# Patient Record
Sex: MALE | Race: WHITE | NOT HISPANIC OR LATINO | Employment: OTHER | URBAN - METROPOLITAN AREA
[De-identification: names, ages, dates, MRNs, and addresses within clinical notes are randomized per-mention and may not be internally consistent; named-entity substitution may affect disease eponyms.]

---

## 2018-11-06 ENCOUNTER — OFFICE VISIT (OUTPATIENT)
Dept: CARDIOLOGY CLINIC | Facility: CLINIC | Age: 83
End: 2018-11-06
Payer: MEDICARE

## 2018-11-06 VITALS
HEIGHT: 68 IN | SYSTOLIC BLOOD PRESSURE: 120 MMHG | DIASTOLIC BLOOD PRESSURE: 68 MMHG | WEIGHT: 170 LBS | HEART RATE: 80 BPM | BODY MASS INDEX: 25.76 KG/M2

## 2018-11-06 DIAGNOSIS — E78.2 MIXED HYPERLIPIDEMIA: ICD-10-CM

## 2018-11-06 DIAGNOSIS — I10 ESSENTIAL HYPERTENSION: ICD-10-CM

## 2018-11-06 DIAGNOSIS — I25.10 CORONARY ARTERY DISEASE INVOLVING NATIVE CORONARY ARTERY OF NATIVE HEART WITHOUT ANGINA PECTORIS: Primary | ICD-10-CM

## 2018-11-06 PROCEDURE — 99214 OFFICE O/P EST MOD 30 MIN: CPT | Performed by: INTERNAL MEDICINE

## 2018-11-06 PROCEDURE — 93000 ELECTROCARDIOGRAM COMPLETE: CPT | Performed by: INTERNAL MEDICINE

## 2018-11-06 NOTE — PROGRESS NOTES
Patient ID: Arun Mckay is a 80 y o  male  Plan:      Essential hypertension  Not currently on any anti-hypertensives  BP controlled today  Mixed hyperlipidemia  Not currently on statin  Has refused statin therapy in the past     Coronary artery disease involving native coronary artery of native heart without angina pectoris  S/p BMS to RCA in 2012  Currently asymptomatic  Follow up Plan:  No changes today  Follow up in 1 year  HPI: The patient presents for follow up regarding CAD (s/p BMS to RCA in 2012), HTN and HLD  He reports some lower extremity edema in his ankles which resolves by morning and is not bothersome to him  He does not have any other complaints  He and his wife are independent and still live alone  He is still driving  He denies chest pain/pressure, palpitations, orthopnea, near-syncope and syncope  Results for orders placed or performed in visit on 11/06/18   POCT ECG    Impression    Sinus rhythm  Low voltage  PVCs  Poor R wave progression  Past Surgical History:   Procedure Laterality Date    CARDIAC CATHETERIZATION  04/04/2012    Single vessel CAD; Successful bare metal stent placed in the distal RCA     CMP:   Lab Results   Component Value Date     12/02/2014    K 3 6 12/02/2014     12/02/2014    CO2 24 4 12/02/2014    BUN 16 12/02/2014    CREATININE 0 97 12/02/2014    GLUCOSE 107 12/02/2014     Lipid Profile:   Lab Results   Component Value Date    CHOL 167 02/18/2013    TRIG 138 02/18/2013    HDL 44 02/18/2013     Review of Systems   10  point ROS  was otherwise non pertinent or negative except as per HPI or as below  Gait: slow, uses cane  Objective:     /68   Pulse 80   Ht 5' 8" (1 727 m)   Wt 77 1 kg (170 lb)   BMI 25 85 kg/m²     PHYSICAL EXAM:    General:  Normal appearance in no distress  Eyes:  Anicteric  Oral mucosa:  Moist   Neck:  No JVD  Carotid upstrokes are brisk without bruits  No masses    Chest:  Clear to auscultation and percussion  Cardiac:  Normal PMI  Normal S1 and S2  No murmur gallop or rub  Abdomen:  Soft and nontender  No palpable organomegaly or aortic enlargement  Extremities:  2+ pitting edema in right ankle, 1+ left ankle  Musculoskeletal:  Symmetric  Vascular:  Femoral pulses are brisk without bruits  Popliteal pulses are intact bilaterally  Pedal pulses are intact  Neuro:  Grossly symmetric  Psych:  Alert and oriented x3  Current Outpatient Prescriptions:     aspirin 81 MG tablet, Take 1 tablet by mouth daily, Disp: , Rfl:   No Known Allergies  Past Medical History:   Diagnosis Date    Athscl heart disease of native coronary artery w/o ang pctrs     Coronary angioplasty status     HTN (hypertension)     Hx of echocardiogram 04/06/2012    EF 0 65, Normal LV function      Hyperlipidemia     Right bundle branch block     Ventricular premature depolarization        History   Smoking Status    Never Smoker   Smokeless Tobacco    Never Used

## 2019-07-29 ENCOUNTER — OFFICE VISIT (OUTPATIENT)
Dept: CARDIOLOGY CLINIC | Facility: CLINIC | Age: 84
End: 2019-07-29
Payer: MEDICARE

## 2019-07-29 VITALS
HEIGHT: 68 IN | DIASTOLIC BLOOD PRESSURE: 82 MMHG | WEIGHT: 170 LBS | BODY MASS INDEX: 25.76 KG/M2 | SYSTOLIC BLOOD PRESSURE: 146 MMHG | HEART RATE: 64 BPM

## 2019-07-29 DIAGNOSIS — I25.10 CORONARY ARTERY DISEASE INVOLVING NATIVE CORONARY ARTERY OF NATIVE HEART WITHOUT ANGINA PECTORIS: Primary | ICD-10-CM

## 2019-07-29 DIAGNOSIS — R60.0 BILATERAL LEG EDEMA: ICD-10-CM

## 2019-07-29 DIAGNOSIS — I10 ESSENTIAL HYPERTENSION: ICD-10-CM

## 2019-07-29 PROCEDURE — 99214 OFFICE O/P EST MOD 30 MIN: CPT | Performed by: INTERNAL MEDICINE

## 2019-07-29 NOTE — ASSESSMENT & PLAN NOTE
Mild   Right worse than left  Similar to 6 months ago  I do not believe this represents any deterioration of left ventricular function

## 2019-07-29 NOTE — PROGRESS NOTES
Patient ID: Breann Houston is a 80 y o  male  Plan:      Mixed hyperlipidemia  Not interested in statin therapy  Coronary artery disease involving native coronary artery of native heart without angina pectoris  Seven years post bare metal stenting of the right coronary artery  No symptoms  Bilateral leg edema  Mild  Right worse than left  Similar to 6 months ago  I do not believe this represents any deterioration of left ventricular function  Follow up Plan: At this point I would be happy to see the patient again as needed  I reviewed parameters for follow-up with his son  HPI:   The patient is seen in follow-up today regarding leg edema  There is really not too much change in this by description and is has been present for at least 1 year  Right leg is worse than left  Mainly it resolves by morning  Since the last visit he stopped driving  Hearing is difficult and there is observed difficulty with getting up from a chair  He is sometimes using a cane  He ought to use a walker  No recent chest pain or chest pressure fortunately  Most recent or relevant cardiac/vascular testing:    Echocardiogram 4/6/2012:  Normal LV function  Past Surgical History:   Procedure Laterality Date    CARDIAC CATHETERIZATION  04/04/2012    Single vessel CAD; Successful bare metal stent placed in the distal RCA     CMP:   Lab Results   Component Value Date     12/02/2014    K 3 6 12/02/2014     12/02/2014    CO2 24 4 12/02/2014    BUN 16 12/02/2014    CREATININE 0 97 12/02/2014    GLUCOSE 107 12/02/2014       Lipid Profile:   Lab Results   Component Value Date    CHOL 167 02/18/2013    TRIG 138 02/18/2013    HDL 44 02/18/2013         Review of Systems   10  point ROS  was otherwise non pertinent or negative except as per HPI or as below  Gait:  Very slow  Wide-based  Using a cane today  Needed assistance with getting up          Objective:     /82   Pulse 64   Ht 5' 8" (1 727 m)   Wt 77 1 kg (170 lb)   BMI 25 85 kg/m²     PHYSICAL EXAM:    General:  Normal appearance in no distress  Eyes:  Anicteric  Oral mucosa:  Moist   Neck:  No JVD  Carotid upstrokes are brisk without bruits  No masses  Chest:  Clear to auscultation and percussion  Cardiac:  Normal PMI  Normal S1 and S2  No murmur gallop or rub  Abdomen:  Soft and nontender  No palpable organomegaly or aortic enlargement  Extremities:  1+ pretibial edema on the right  Trace on the left  Musculoskeletal:  Symmetric  Vascular:  Femoral pulses are brisk without bruits  Popliteal pulses are intact bilaterally  Pedal pulses are intact  Neuro:  Grossly symmetric  Psych:  Alert and oriented x3  Current Outpatient Medications:     aspirin 81 MG tablet, Take 1 tablet by mouth daily, Disp: , Rfl:   No Known Allergies  Past Medical History:   Diagnosis Date    Athscl heart disease of native coronary artery w/o ang pctrs     Coronary angioplasty status     HTN (hypertension)     Hx of echocardiogram 04/06/2012    EF 0 65, Normal LV function      Hyperlipidemia     Right bundle branch block     Ventricular premature depolarization            Social History     Tobacco Use   Smoking Status Never Smoker   Smokeless Tobacco Never Used

## 2019-09-22 ENCOUNTER — HOSPITAL ENCOUNTER (INPATIENT)
Facility: HOSPITAL | Age: 84
LOS: 20 days | Discharge: NURSING, REHAB OR LONG-TERM CARE | DRG: 082 | End: 2019-10-12
Attending: INTERNAL MEDICINE | Admitting: INTERNAL MEDICINE
Payer: MEDICARE

## 2019-09-22 ENCOUNTER — APPOINTMENT (EMERGENCY)
Dept: CT IMAGING | Facility: HOSPITAL | Age: 84
End: 2019-09-22
Payer: MEDICARE

## 2019-09-22 ENCOUNTER — APPOINTMENT (EMERGENCY)
Dept: RADIOLOGY | Facility: HOSPITAL | Age: 84
End: 2019-09-22
Payer: MEDICARE

## 2019-09-22 ENCOUNTER — HOSPITAL ENCOUNTER (EMERGENCY)
Facility: HOSPITAL | Age: 84
End: 2019-09-22
Attending: EMERGENCY MEDICINE | Admitting: EMERGENCY MEDICINE
Payer: MEDICARE

## 2019-09-22 VITALS
HEART RATE: 74 BPM | RESPIRATION RATE: 18 BRPM | BODY MASS INDEX: 25.81 KG/M2 | DIASTOLIC BLOOD PRESSURE: 87 MMHG | TEMPERATURE: 98.4 F | SYSTOLIC BLOOD PRESSURE: 186 MMHG | WEIGHT: 169.75 LBS | OXYGEN SATURATION: 99 %

## 2019-09-22 DIAGNOSIS — R21 RASH: ICD-10-CM

## 2019-09-22 DIAGNOSIS — R47.01 APHASIA: ICD-10-CM

## 2019-09-22 DIAGNOSIS — I10 ESSENTIAL HYPERTENSION: ICD-10-CM

## 2019-09-22 DIAGNOSIS — G93.40 ENCEPHALOPATHY: ICD-10-CM

## 2019-09-22 DIAGNOSIS — I60.9 SAH (SUBARACHNOID HEMORRHAGE) (HCC): Primary | ICD-10-CM

## 2019-09-22 DIAGNOSIS — I61.9 HEMORRHAGIC STROKE (HCC): Primary | ICD-10-CM

## 2019-09-22 DIAGNOSIS — R33.9 URINARY RETENTION: ICD-10-CM

## 2019-09-22 DIAGNOSIS — E78.2 MIXED HYPERLIPIDEMIA: ICD-10-CM

## 2019-09-22 DIAGNOSIS — N17.9 ACUTE KIDNEY INJURY (HCC): ICD-10-CM

## 2019-09-22 PROBLEM — I48.91 A-FIB (HCC): Status: ACTIVE | Noted: 2019-09-22

## 2019-09-22 LAB
AMMONIA PLAS-SCNC: <10 UMOL/L (ref 11–35)
ANION GAP SERPL CALCULATED.3IONS-SCNC: 10 MMOL/L (ref 4–13)
APTT PPP: 32 SECONDS (ref 23–37)
BACTERIA UR QL AUTO: ABNORMAL /HPF
BASOPHILS # BLD AUTO: 0.07 THOUSANDS/ΜL (ref 0–0.1)
BASOPHILS NFR BLD AUTO: 1 % (ref 0–1)
BILIRUB UR QL STRIP: NEGATIVE
BUN SERPL-MCNC: 33 MG/DL (ref 5–25)
CALCIUM SERPL-MCNC: 8.8 MG/DL (ref 8.3–10.1)
CHLORIDE SERPL-SCNC: 105 MMOL/L (ref 100–108)
CLARITY UR: ABNORMAL
CO2 SERPL-SCNC: 24 MMOL/L (ref 21–32)
COLOR UR: YELLOW
CREAT SERPL-MCNC: 2.14 MG/DL (ref 0.6–1.3)
EOSINOPHIL # BLD AUTO: 0.12 THOUSAND/ΜL (ref 0–0.61)
EOSINOPHIL NFR BLD AUTO: 2 % (ref 0–6)
ERYTHROCYTE [DISTWIDTH] IN BLOOD BY AUTOMATED COUNT: 13.3 % (ref 11.6–15.1)
GFR SERPL CREATININE-BSD FRML MDRD: 25 ML/MIN/1.73SQ M
GLUCOSE SERPL-MCNC: 97 MG/DL (ref 65–140)
GLUCOSE SERPL-MCNC: 97 MG/DL (ref 65–140)
GLUCOSE UR STRIP-MCNC: NEGATIVE MG/DL
HCT VFR BLD AUTO: 41 % (ref 36.5–49.3)
HGB BLD-MCNC: 13.8 G/DL (ref 12–17)
HGB UR QL STRIP.AUTO: ABNORMAL
IMM GRANULOCYTES # BLD AUTO: 0.02 THOUSAND/UL (ref 0–0.2)
IMM GRANULOCYTES NFR BLD AUTO: 0 % (ref 0–2)
INR PPP: 1.14 (ref 0.84–1.19)
KETONES UR STRIP-MCNC: NEGATIVE MG/DL
LACTATE SERPL-SCNC: 1.3 MMOL/L (ref 0.5–2)
LEUKOCYTE ESTERASE UR QL STRIP: NEGATIVE
LYMPHOCYTES # BLD AUTO: 1.87 THOUSANDS/ΜL (ref 0.6–4.47)
LYMPHOCYTES NFR BLD AUTO: 23 % (ref 14–44)
MCH RBC QN AUTO: 31.2 PG (ref 26.8–34.3)
MCHC RBC AUTO-ENTMCNC: 33.7 G/DL (ref 31.4–37.4)
MCV RBC AUTO: 93 FL (ref 82–98)
MONOCYTES # BLD AUTO: 0.58 THOUSAND/ΜL (ref 0.17–1.22)
MONOCYTES NFR BLD AUTO: 7 % (ref 4–12)
NEUTROPHILS # BLD AUTO: 5.42 THOUSANDS/ΜL (ref 1.85–7.62)
NEUTS SEG NFR BLD AUTO: 67 % (ref 43–75)
NITRITE UR QL STRIP: NEGATIVE
NON-SQ EPI CELLS URNS QL MICRO: ABNORMAL /HPF
NRBC BLD AUTO-RTO: 0 /100 WBCS
PH UR STRIP.AUTO: 6 [PH]
PLATELET # BLD AUTO: 171 THOUSANDS/UL (ref 149–390)
PMV BLD AUTO: 10.6 FL (ref 8.9–12.7)
POTASSIUM SERPL-SCNC: 4.3 MMOL/L (ref 3.5–5.3)
PROT UR STRIP-MCNC: ABNORMAL MG/DL
PROTHROMBIN TIME: 14.6 SECONDS (ref 11.6–14.5)
RBC # BLD AUTO: 4.42 MILLION/UL (ref 3.88–5.62)
RBC #/AREA URNS AUTO: ABNORMAL /HPF
SODIUM SERPL-SCNC: 139 MMOL/L (ref 136–145)
SP GR UR STRIP.AUTO: 1.01 (ref 1–1.03)
UROBILINOGEN UR QL STRIP.AUTO: 0.2 E.U./DL
WBC # BLD AUTO: 8.08 THOUSAND/UL (ref 4.31–10.16)
WBC #/AREA URNS AUTO: ABNORMAL /HPF

## 2019-09-22 PROCEDURE — 99285 EMERGENCY DEPT VISIT HI MDM: CPT

## 2019-09-22 PROCEDURE — 93005 ELECTROCARDIOGRAM TRACING: CPT

## 2019-09-22 PROCEDURE — 81001 URINALYSIS AUTO W/SCOPE: CPT | Performed by: EMERGENCY MEDICINE

## 2019-09-22 PROCEDURE — 82948 REAGENT STRIP/BLOOD GLUCOSE: CPT

## 2019-09-22 PROCEDURE — 1123F ACP DISCUSS/DSCN MKR DOCD: CPT | Performed by: GENERAL PRACTICE

## 2019-09-22 PROCEDURE — 96360 HYDRATION IV INFUSION INIT: CPT

## 2019-09-22 PROCEDURE — 99291 CRITICAL CARE FIRST HOUR: CPT | Performed by: EMERGENCY MEDICINE

## 2019-09-22 PROCEDURE — 83605 ASSAY OF LACTIC ACID: CPT | Performed by: EMERGENCY MEDICINE

## 2019-09-22 PROCEDURE — 36415 COLL VENOUS BLD VENIPUNCTURE: CPT | Performed by: EMERGENCY MEDICINE

## 2019-09-22 PROCEDURE — 82140 ASSAY OF AMMONIA: CPT | Performed by: EMERGENCY MEDICINE

## 2019-09-22 PROCEDURE — 99223 1ST HOSP IP/OBS HIGH 75: CPT | Performed by: INTERNAL MEDICINE

## 2019-09-22 PROCEDURE — 70498 CT ANGIOGRAPHY NECK: CPT

## 2019-09-22 PROCEDURE — 85610 PROTHROMBIN TIME: CPT | Performed by: EMERGENCY MEDICINE

## 2019-09-22 PROCEDURE — 70496 CT ANGIOGRAPHY HEAD: CPT

## 2019-09-22 PROCEDURE — 85730 THROMBOPLASTIN TIME PARTIAL: CPT | Performed by: EMERGENCY MEDICINE

## 2019-09-22 PROCEDURE — 71045 X-RAY EXAM CHEST 1 VIEW: CPT

## 2019-09-22 PROCEDURE — 80048 BASIC METABOLIC PNL TOTAL CA: CPT | Performed by: EMERGENCY MEDICINE

## 2019-09-22 PROCEDURE — 85025 COMPLETE CBC W/AUTO DIFF WBC: CPT | Performed by: EMERGENCY MEDICINE

## 2019-09-22 RX ORDER — LABETALOL 20 MG/4 ML (5 MG/ML) INTRAVENOUS SYRINGE
10 EVERY 6 HOURS PRN
Status: DISCONTINUED | OUTPATIENT
Start: 2019-09-22 | End: 2019-09-23

## 2019-09-22 RX ORDER — ACETAMINOPHEN 325 MG/1
650 TABLET ORAL EVERY 6 HOURS PRN
Status: DISCONTINUED | OUTPATIENT
Start: 2019-09-22 | End: 2019-09-24

## 2019-09-22 RX ORDER — SODIUM CHLORIDE, SODIUM GLUCONATE, SODIUM ACETATE, POTASSIUM CHLORIDE, MAGNESIUM CHLORIDE, SODIUM PHOSPHATE, DIBASIC, AND POTASSIUM PHOSPHATE .53; .5; .37; .037; .03; .012; .00082 G/100ML; G/100ML; G/100ML; G/100ML; G/100ML; G/100ML; G/100ML
50 INJECTION, SOLUTION INTRAVENOUS CONTINUOUS
Status: DISCONTINUED | OUTPATIENT
Start: 2019-09-22 | End: 2019-09-23

## 2019-09-22 RX ORDER — NYSTATIN 100000 [USP'U]/G
POWDER TOPICAL 2 TIMES DAILY
Status: DISCONTINUED | OUTPATIENT
Start: 2019-09-23 | End: 2019-10-12 | Stop reason: HOSPADM

## 2019-09-22 RX ORDER — SODIUM CHLORIDE 9 MG/ML
125 INJECTION, SOLUTION INTRAVENOUS CONTINUOUS
Status: DISCONTINUED | OUTPATIENT
Start: 2019-09-22 | End: 2019-09-22 | Stop reason: HOSPADM

## 2019-09-22 RX ADMIN — SODIUM CHLORIDE 125 ML/HR: 0.9 INJECTION, SOLUTION INTRAVENOUS at 16:59

## 2019-09-22 RX ADMIN — SODIUM CHLORIDE, SODIUM GLUCONATE, SODIUM ACETATE, POTASSIUM CHLORIDE AND MAGNESIUM CHLORIDE 50 ML/HR: 526; 502; 368; 37; 30 INJECTION, SOLUTION INTRAVENOUS at 21:22

## 2019-09-22 RX ADMIN — LABETALOL 20 MG/4 ML (5 MG/ML) INTRAVENOUS SYRINGE 10 MG: at 22:36

## 2019-09-22 RX ADMIN — IOHEXOL 85 ML: 350 INJECTION, SOLUTION INTRAVENOUS at 16:06

## 2019-09-22 NOTE — STROKE DOCUMENTATION
Patient stated, "hello" when I said hello  Patient was able to state name and date of birth once while I was in the room with the patient

## 2019-09-22 NOTE — STROKE DOCUMENTATION
Dr Filomena Wallace speaking with son Nikia Castaneda on the phone, son admits to father being Jehovah witness and will refuse blood products  Patient's last well known is unknown and son states, "It has been months since he isn't himself " Dr Filomena Wallace will address code status with family

## 2019-09-22 NOTE — STROKE DOCUMENTATION
Patient arrived to ED with wet shorts and no underwear  Patient is excoriated bilaterally in groin region  Groin and perineal region washed

## 2019-09-22 NOTE — EMTALA/ACUTE CARE TRANSFER
[facility] EMERGENCY DEPARTMENT  [address] [place] [id number]  Dept: [address] [place] TRANSFER CONSENT    NAME Roma Bullard                                         1924                              MRN [id number]    I have been informed of my rights regarding examination, treatment, and transfer   by Dr Toy Guerrier MD    Benefits: Specialized equipment and/or services available at the receiving facility (Include comment)________________________(Intensive care unit with frequent neurologic checks, blood pressure control)    Risks: Potential for delay in receiving treatment, Potential deterioration of medical condition, Loss of IV, Increased discomfort during transfer, Possible worsening of condition or death during transfer      Consent for Transfer:  I acknowledge that my medical condition has been evaluated and explained to me by the emergency department physician or other qualified medical person and/or my attending physician, who has recommended that I be transferred to the service of  Accepting Physician: Dr Kathya Blanton at [facility] Name, [facility]  The above potential benefits of such transfer, the potential risks associated with such transfer, and the probable risks of not being transferred have been explained to me, and I fully understand them  The doctor has explained that, in my case, the benefits of transfer outweigh the risks  I agree to be transferred  I authorize the performance of emergency medical procedures and treatments upon me in both transit and upon arrival at the receiving facility  Additionally, I authorize the release of any and all medical records to the receiving facility and request they be transported with me, if possible  I understand that the safest mode of transportation during a medical emergency is an ambulance and that the Hospital advocates the use of this mode of transport   Risks of traveling to the receiving facility by car, including absence of medical control, life sustaining equipment, such as oxygen, and medical personnel has been explained to me and I fully understand them  (STEPHANIE CORRECT BOX BELOW)  [  ]  I consent to the stated transfer and to be transported by ambulance/helicopter  [  ]  I consent to the stated transfer, but refuse transportation by ambulance and accept full responsibility for my transportation by car  I understand the risks of non-ambulance transfers and I exonerate the Hospital and its staff from any deterioration in my condition that results from this refusal     X___________________________________________    DATE  19  TIME________  Signature of patient or legally responsible individual signing on patient behalf           RELATIONSHIP TO PATIENT_________________________          Provider Certification    NAME Roma Bullard                                         1924                              MRN [id number]    A medical screening exam was performed on the above named patient  Based on the examination:    Condition Necessitating Transfer The primary encounter diagnosis was Hemorrhagic stroke ([facility] [address] )  Diagnoses of Aphasia and Acute kidney injury [facility]) were also pertinent to this visit      Patient Condition: The patient has been stabilized such that within reasonable medical probability, no material deterioration of the patient condition or the condition of the unborn child(winnie) is likely to result from the transfer    Reason for Transfer: Level of Care needed not available at this facility    [facility]   · Space available and qualified personnel available for treatment as acknowledged by    · Agreed to accept transfer and to provide appropriate medical treatment as acknowledged by       Dr Kathya Blanton  · Appropriate medical records of the examination and treatment of the patient are provided at the time of transfer STAFF INITIAL WHEN COMPLETED _______  · Transfer will be performed by qualified personnel from    and appropriate transfer equipment as required, including the use of necessary and appropriate life support measures  Provider Certification: I have examined the patient and explained the following risks and benefits of being transferred/refusing transfer to the patient/family:  General risk, such as traffic hazards, adverse weather conditions, rough terrain or turbulence, possible failure of equipment (including vehicle or aircraft), or consequences of actions of persons outside the control of the transport personnel, Unanticipated needs of medical equipment and personnel during transport, The possibility of a transport vehicle being unavailable, Risk of worsening condition      Based on these reasonable risks and benefits to the patient and/or the unborn child(winnie), and based upon the information available at the time of the patients examination, I certify that the medical benefits reasonably to be expected from the provision of appropriate medical treatments at another medical facility outweigh the increasing risks, if any, to the individuals medical condition, and in the case of labor to the unborn child, from effecting the transfer      X____________________________________________ DATE 09/22/19        TIME_______      ORIGINAL - SEND TO MEDICAL RECORDS   COPY - SEND WITH PATIENT DURING TRANSFER

## 2019-09-22 NOTE — QUICK NOTE
Paged at [phone] by Dr Vin Feldman ([phone]) at [facility] ED to activate stroke alert for this patient  Patient is a 80year old man with HTN, CAD, HLD, who was discovered this afternoon to have expressive and receptive aphasia  At baseline, he reportedly lives with his wife who is 80, and his son and daughter in law live next door  He walks using a cane or walker, and does not drive  His son and other family perform daily chores and errands for him  Of note, the son reports that he has had other similar presentations to this one in the past       The son reported that patient had dinner with family last night, and he was able to speak, but had diminished fluency, which is his baseline  Overnight, his wife said he did not sleep, and was heard walking around in the house  At about 0700 this AM, he was sitting in a chair, not speaking  His son came over and offered him some juice, which in the past has helped him wake up, but this AM had no effect  They did not activate EMS because the concern was low that he was in distress, as he had prior episodes similar to this, and he had not slept overnight  Later in the afternoon, when he did not improve, they activated EMS  On exam:   , /98  He is awake, alert, but not attending the examiner  He does not name, follow commands, or have any speech  No cranial nerve deficitis  There is mild drift in the L leg, but otherwise, symmetric antigravity strength in the other extremities, with no drift  Unable to test coordination  Estimated NIHSS is 8, failure to follow commands, answer orientation questions, and aphasia, as well as drift in the L leg  CTH images reviewed: diffuse hyperdensity in the subcortical white matter of the L frontal and parietal lobes, with some cortical edema  CTA H&N images reviewed: bilateral carotid atherosclerotic calcified plaques, L>R        A/P:   80year old man with above medical conditions, presenting with aphasia, and imaging with subacute appearing hemorrhage in the L frontal/parietal subcortical white matter, following contours of the cortex, suggestive of SAH  Clinically, the Hunt and Giron score would be 4, given patient's significant mental status changes  Not a TPA candidate as his stroke symptoms are from intracerebral hemorrhage and edema  Recommended BP management with GGT, goal of systolic [phone]  Can reverse ASA with DDAVP (although son states he may not have taken it for some time), and admission to a monitored setting where he can undergo Q1h neuro checks  MRI brain may be informative in assessing for any underlying structural lesions resulting in his ICH  Would also encourage goals of care discussion with patient's family

## 2019-09-22 NOTE — ED PROVIDER NOTES
History  Chief Complaint   Patient presents with    Altered Mental Status     Patient comes to ER for evaluation of AMS  Patient was found to be unresposive to EMS however with a sternal rub patient opened eyes  Patient is non-verbal at this time      HPI   This is a 58-year-old male presenting via ambulance with altered mental status  Patient resides at home  His family called 911 after patient failed to answer any questions and was acting confused  Last known normal was 9:30 a m , when patient had a muffin  Patient did not say much today, last time he had formed any sentences was last night  Per patient's son, Mr Hoang Gaffney, ([phone]), patient has been gradually declining over the past several months, occasionally not speaking, although mostly been able to ambulate and speak  Patient's medication list notes aspirin, however, according to son, patient has not had aspirin in probably months  Patient resides at home  He is not taking any medications  He has past medical history includes coronary artery disease, patient had a stent placed about 15 years ago  The rest of past medical history is as per epic chart below  To EMS, patient was awake, he was able to open his eyes , he said yes" 1 time, however, since that time, he has been completely mute  He has not been following any commands  He is hard of hearing and wears an amplifier his left ear, however, although it seems to work, he has not been able to communicate  According to patient's son, patient has not had a fall since over a month ago  Point of care glucose was 101 to EMS  Prior to Admission Medications   Prescriptions Last Dose Informant Patient Reported?  Taking?   aspirin 81 MG tablet   Yes No   Sig: Take 1 tablet by mouth daily      Facility-Administered Medications: None       Past Medical History:   Diagnosis Date    Athscl heart disease of native coronary artery w/o ang pctrs     Coronary angioplasty status     HTN (hypertension)     Hx of echocardiogram 04/06/2012    EF 0 65, Normal LV function   Hyperlipidemia     Right bundle branch block     Ventricular premature depolarization        Past Surgical History:   Procedure Laterality Date    CARDIAC CATHETERIZATION  04/04/2012    Single vessel CAD; Successful bare metal stent placed in the distal RCA       Family History   Problem Relation Age of Onset    No Known Problems Family         noncontributory     I have reviewed and agree with the history as documented  Social History     Tobacco Use    Smoking status: Never Smoker    Smokeless tobacco: Never Used   Substance Use Topics    Alcohol use: No    Drug use: Not on file        Review of Systems   Unable to perform ROS: Patient nonverbal   Unable to perform review of systems, patient is seemingly unable to perceive anything spoken to him, nor, follow any commands  Physical Exam  Physical Exam   Constitutional: He appears well-developed and well-nourished  Non-toxic appearance  Mildly tearful   HENT:   Head: Atraumatic  Eyes: Pupils are equal, round, and reactive to light  Unable to assess extraocular movements, patient does not follow commands  There does not appear to be gaze preference, and he appears to follow this physician around with his eyes   Neck: Neck supple  Cardiovascular: Normal rate  Exam reveals no friction rub  No murmur heard  Pulmonary/Chest: Effort normal and breath sounds normal  No respiratory distress  He has no wheezes  He exhibits no tenderness  Abdominal: Soft  Bowel sounds are normal  He exhibits no distension  There is no tenderness  Musculoskeletal: He exhibits edema (Trace pedal edema bilaterally)  Skin: Skin is warm and dry  Rash:  intertriginous rash in bilateral groins, no erythema or warmth  No sacral ulcer   Nursing note and vitals reviewed    Neurologic exam:  Patient is awake, appears to look around, however , he is unable to follow any commands , nor is he on a ring any words  When right and left arm are held up, he is able to hold them up without drift  When the right leg is held up, he is able to hold up without drift  One left leg is held up, he appears to have a very mild drift, strength due to be 4/5  There is no obvious facial asymmetry  There is no nystagmus  Unable to assess extraocular movements as patient does not follow commands  Stroke Assessment     Row Name 09/22/19 1602             NIH Stroke Scale    Interval  Baseline      Level of Consciousness (1a )  0      LOC Questions (1b )  2      LOC Commands (1c )  2      Best Gaze (2 )  0      Visual (3 )  0      Facial Palsy (4 )  0      Motor Arm, Left (5a )  0      Motor Arm, Right (5b )  0      Motor Leg, Left (6a )  1      Motor Leg, Right (6b )  0      Limb Ataxia (7 )  0      Sensory (8 )  0      Best Language (9 )  3      Dysarthria (10 )  2      Extinction and Inattention (11 ) (Formerly Neglect)  0      Total  10          First Filed Value   TPA Decision  Patient not a TPA candidate  Patient is not a candidate options  Unclear time of onset outside appropriate time window  , subarachnoid hemorrhage            Vital Signs  Vitals:    09/22/19 1631 09/22/19 1646 09/22/19 1701 09/22/19 1716   BP: 151/74 147/67 138/85 147/72   TempSrc:       Pulse: 63 66 60 72   Resp: 18 18 16 16   Patient Position - Orthostatic VS: Lying Lying Lying Lying   Temp:           ED Medications  Medications   sodium chloride 0 9 % infusion (125 mL/hr Intravenous New Bag 9/22/19 1659)   iohexol (OMNIPAQUE) 350 MG/ML injection (MULTI-DOSE) 85 mL (85 mL Intravenous Given 9/22/19 1606)       Diagnostic Studies  Results Reviewed     Procedure Component Value Units Date/Time    Lactic acid, plasma [[id number]]  (Normal) Collected:  09/22/19 1606    Lab Status:  Final result Specimen:  Blood from Arm, Right Updated:  09/22/19 1632     LACTIC ACID 1 3 mmol/L     Narrative:       Result may be elevated if tourniquet was used during collection      Ammonia [[id number]]  (Abnormal) Collected:  09/22/19 1606    Lab Status:  Final result Specimen:  Blood from Arm, Right Updated:  09/22/19 1624     Ammonia <10 umol/L     Basic metabolic panel [[id number]]  (Abnormal) Collected:  09/22/19 1606    Lab Status:  Final result Specimen:  Blood from Arm, Right Updated:  09/22/19 1622     Sodium 139 mmol/L      Potassium 4 3 mmol/L      Chloride 105 mmol/L      CO2 24 mmol/L      ANION GAP 10 mmol/L      BUN 33 mg/dL      Creatinine 2 14 mg/dL      Glucose 97 mg/dL      Calcium 8 8 mg/dL      eGFR 25 ml/min/1 73sq m     Narrative:       [place] guidelines for Chronic Kidney Disease (CKD):     Stage 1 with normal or high GFR (GFR > 90 mL/min/1 73 square meters)    Stage 2 Mild CKD (GFR = 60-89 mL/min/1 73 square meters)    Stage 3A Moderate CKD (GFR = 45-59 mL/min/1 73 square meters)    Stage 3B Moderate CKD (GFR = 30-44 mL/min/1 73 square meters)    Stage 4 Severe CKD (GFR = 15-29 mL/min/1 73 square meters)    Stage 5 End Stage CKD (GFR <15 mL/min/1 73 square meters)  Note: GFR calculation is accurate only with a steady state creatinine    CBC and differential [[id number]] Collected:  09/22/19 1606    Lab Status:  Final result Specimen:  Blood from Arm, Right Updated:  09/22/19 1612     WBC 8 08 Thousand/uL      RBC 4 42 Million/uL      Hemoglobin 13 8 g/dL      Hematocrit 41 0 %      MCV 93 fL      MCH 31 2 pg      MCHC 33 7 g/dL      RDW 13 3 %      MPV 10 6 fL      Platelets 418 Thousands/uL      nRBC 0 /100 WBCs      Neutrophils Relative 67 %      Immat GRANS % 0 %      Lymphocytes Relative 23 %      Monocytes Relative 7 %      Eosinophils Relative 2 %      Basophils Relative 1 %      Neutrophils Absolute 5 42 Thousands/µL      Immature Grans Absolute 0 02 Thousand/uL      Lymphocytes Absolute 1 87 Thousands/µL      Monocytes Absolute 0 58 Thousand/µL      Eosinophils Absolute 0 12 Thousand/µL      Basophils Absolute 0 07 Thousands/µL     Fingerstick Glucose (POCT) [[id number]]  (Normal) Collected:  09/22/19 1606    Lab Status:  Final result Updated:  09/22/19 1609     POC Glucose 97 mg/dl     Protime-INR [[id number]] Collected:  09/22/19 1606    Lab Status:  No result Specimen:  Blood from Arm, Right     APTT [[id number]] Collected:  09/22/19 1606    Lab Status:  No result Specimen:  Blood from Arm, Right     UA w Reflex to Microscopic w Reflex to Culture [[id number]]     Lab Status:  No result Specimen:  Urine                  CT stroke alert brain   Final Result by Rad Vasquez MD (09/22 1642)      Suspect left central sulcus subarachnoid hemorrhage  Possible right frontal subarachnoid hemorrhage   Noncontrast brain MRI recommended for further evaluation  Findings were directly discussed with Ashli García on 9/22/2019 4:13 PM       Workstation performed: [id number]         CTA stroke alert (head/neck)   Final Result by Rad Vasquez MD (09/22 1633)      No evidence of hemodynamic significant stenosis, aneurysm or dissection        Debris within the trachea possibly aspirational          Findings were directly discussed with Ashli García on 9/22/2019 4:20 PM                      Workstation performed: [id number]         X-ray chest 1 view portable    (Results Pending)          Procedures  CriticalCare Time  Performed by: Mika Oliver MD  Authorized by: Mika Oliver MD     Critical care provider statement:     Critical care time (minutes):  38    Critical care start time:  9/22/2019 4:02 PM    Critical care end time:  9/22/2019 4:40 PM    Critical care time was exclusive of:  Separately billable procedures and treating other patients    Critical care was necessary to treat or prevent imminent or life-threatening deterioration of the following conditions:  CNS failure or compromise and renal failure    Critical care was time spent personally by me on the following activities:  Blood draw for specimens, obtaining history from patient or surrogate, development of treatment plan with patient or surrogate, discussions with consultants, examination of patient, ordering and review of laboratory studies, ordering and review of radiographic studies, re-evaluation of patient's condition and review of old charts        ED Course       EKG:  Tremulous baseline limits interpretation, no discernible P-waves making atrial fibrillation with normal ventricular response possible, ventricular rate 67,  consistent with incomplete right bundle branch block, , left axis deviation, LVH by aVL criteria, T-wave inversions in lateral precordial leads are either secondary to incomplete right bundle branch block or due to age-indeterminate ischemic changes  No STEMI  68-year-old male presenting with altered mental status, and with global aphasia  Vital signs reviewed, initially, blood pressure 169/98, this is spontaneously improving  Differential diagnosis includes stroke, underlying infection, or toxic/endocrine aberrancies resulting in altered mental status  Last known normal is either 9:30 a m  Or 7:00 a m   Nonetheless, given global aphasia as well as drift and left leg, decision made to activate a code stroke  CT head and CTA head and neck angiography are as above, CT head reveals left central sulcus subarachnoid hemorrhage with possible right frontal subarachnoid hemorrhage  Patient does not appear to have evidence of trauma, and in discussion with the son, no falls within the past month, making hemorrhagic stroke somewhat less likely to be traumatic  In discussion with Dr Pepe Gómez, will control patient's blood pressure with goal systolic blood pressure between 140 and 160  Patient has not required any antihypertensive while in the emergency department as his blood pressure spontaneously down trended to 934 systolic  Patient has not had aspirin in several months according to son, DDAVP deferred    In discussion with the son, patient is a Jew and she would not receive any whole blood products  For blood fractions, patient's son requests discussion with the [address] [place] [address] liaison at the hospital as well as with him  Patient's son expresses agreement with transferring patient to [facility] for further evaluation and care in the intensive care unit setting  Case discussed with Dr Diamond Arriola who kindly accepts patient for admission  Chest x-ray to my review was quite limited by portal study and positioning of the patient  Patient's labs revealed a creatinine of 2 14, which is new for patient and concerning for acute kidney injury  Unfortunately, given patient's presenting symptoms, obtaining CT head and neck angiography to uncover etiology of his presenting symptom outweighed the risk of contrast induced nephropathy  In hopes of mediating worsening kidney function, I started patient on maintenance fluids with normal saline at 125 mL per hour x8 hours  In discussion with patient's son, patient would be all right with IV fluids and antibiotics and other measures to help treat his conditions, however, patient would not want to be intubated or have CPR performed on him  He would not want to have heroic measures  Upon barrera catheter placement, patient had UOP of almost 3 L   Concern that an obstructive p    MDM  Number of Diagnoses or Management Options  Acute kidney injury [facility]): new and does not require workup  Aphasia: new and requires workup  Hemorrhagic stroke [facility]): new and requires workup     Amount and/or Complexity of Data Reviewed  Clinical lab tests: ordered and reviewed  Tests in the radiology section of CPT®: ordered and reviewed  Tests in the medicine section of CPT®: ordered and reviewed    Risk of Complications, Morbidity, and/or Mortality  Presenting problems: high  Diagnostic procedures: high  Management options: high    Patient Progress  Patient progress: stable      Disposition  Final diagnoses:   Hemorrhagic stroke [facility])   Aphasia   Acute kidney injury ([facility] [address] )     Time reflects when diagnosis was documented in both MDM as applicable and the Disposition within this note     Time User Action Codes Description Comment    9/22/2019  4:57 PM Yolanda Ping Add [I61 9] Hemorrhagic stroke ([facility] [address] )     9/22/2019  4:57 PM Yolanda Ping Add [R47 01] Aphasia     9/22/2019  4:57 PM Yolanda Ping Add [N17 9] Acute kidney injury [facility])       ED Disposition     ED Disposition Condition Date/Time Comment    Transfer to Another Facility-In Network  [place] Sep 22, 2019  4:57 PM Suzan Rubin should be transferred out to [facility] Intensive Care [facility]          MD Documentation      Most Recent Value   Patient Condition  The patient has been stabilized such that within reasonable medical probability, no material deterioration of the patient condition or the condition of the unborn child(winnie) is likely to result from the transfer   Reason for Transfer  Level of Care needed not available at this facility   Benefits of Transfer  Specialized equipment and/or services available at the receiving facility (Include comment)________________________ [facility] care unit with frequent neurologic checks, blood pressure control]   Risks of Transfer  Potential for delay in receiving treatment, Potential deterioration of medical condition, Loss of IV, Increased discomfort during transfer, Possible worsening of condition or death during transfer   Accepting Physician  Dr Ashlyn Panchal Name, Yazan Caputo   Provider Certification  General risk, such as traffic hazards, adverse weather conditions, rough terrain or turbulence, possible failure of equipment (including vehicle or aircraft), or consequences of actions of persons outside the control of the transport personnel, Unanticipated needs of medical equipment and personnel during transport, The possibility of a transport vehicle being unavailable, Risk of worsening condition      RN Documentation      Most Recent Value   Accepting Facility Name, [facility]      Follow-up Information    None         ED Provider  Electronically Signed by           Dale Zee MD  09/22/19 1492       Dale Zee MD  09/23/19 [address], [place]  09/23/19 8342

## 2019-09-22 NOTE — H&P
History and Physical - Critical Care   Reina Ovalle 80 y o  male MRN: [id number]  Unit/Bed#: ICU 11 Encounter: [id number]    Reason for Admission / Chief Complaint: SAH L central sulcus, possible R frontal as well    History of Present Illness:  Reina Ovalle is a 80 y o  male who presented to [facility] ED today and stroke alert was activated for aphasia  CT showed L central sulcus SAH and possible R frontal SAH  Pt lives at home with wife, and son lives next door  Pt takes no meds  History of CAD s/p stent 15 yrs ago  Unknown if there was any fall, but this afternoon pt was reportedly found sitting in a chair, seemed confused, not speaking, not following commands  Per family, pt has had gradual decline over the last several months, sometimes having difficulty speaking, often not sleeping at night and wandering the house  Pt unable to provide history at this time  He says he does not know what happened today  Does not know why he is in the hospital  Denies any pain or any complaints  Per son who is MPOA, pt is Rastafari  DNR/DNI  History obtained from chart review  ______________________________________________________________________    Assessment:   Principal Problem:    SAH (subarachnoid hemorrhage) (HCC)  Active Problems:    Coronary artery disease involving native coronary artery of native heart without angina pectoris    Bilateral leg edema    A-fib (HCC)  Resolved Problems:    * No resolved hospital problems  *      Plan:    Neuro:   SAH- pt has waxing/waning speech difficulty but this appears to be baseline  Otherwise no focal deficits  Pt follows commands, answers questions, strength and sensation intact all extremities  Neurology consult  Appreciate recs  Repeat CTH in AM  q2h neuro checks  sbp goal 140-160  Cam ICU, delirium prevention  CV:   Atrial fibrillation, rate controlled  New onset? Echo ordered  Monitor rate  No anticoagulation at this time given SAH   TSH in AM   Labetalol to keep sbp 140-160 as above  History of CAD- takes no home meds  Stent 15 yrs ago    Pulm:   No acute issues  Encourage deep breathing  Continue to monitor  GI:   Regular diet    :   No acute issues  Monitor I/Os    F/E/N:   isolyte at 50ml/hr  Replete electrolytes as indicated  Possible FRANCOIS  Cr 2 14 on admission  Uncertain if pt has baseline CKD but was normal in 2014  ID:  No evidence of acute infection at this time  Continue to monitor  Heme:   Pt has ASA on his medication list but per family he is not actually taking it  No indication for ddavp  SCDs for DVT prophylaxis    Endo:   No known history of DM  HgbA1c and TSH in AM    Msk/Skin:   Nystatin powder to groin area  Frequent turning, repositioning  Early mobilization  PT/OT    Disposition: level 1 stepdown for frequent neuro checks    ______________________________________________________________________  Past Medical History:  Past Medical History:   Diagnosis Date    Athscl heart disease of native coronary artery w/o ang pctrs     Coronary angioplasty status     HTN (hypertension)     Hx of echocardiogram 04/06/2012    EF 0 65, Normal LV function   Hyperlipidemia     Right bundle branch block     Ventricular premature depolarization        Past Surgical History:  Past Surgical History:   Procedure Laterality Date    CARDIAC CATHETERIZATION  04/04/2012    Single vessel CAD;  Successful bare metal stent placed in the distal RCA       Past Family History:  Family History   Problem Relation Age of Onset    No Known Problems Family         noncontributory       Social History:  Social History     Tobacco Use   Smoking Status Never Smoker   Smokeless Tobacco Never Used     Social History     Substance and Sexual Activity   Alcohol Use No     Social History     Substance and Sexual Activity   Drug Use Not on file     Marital Status: /Civil Union    Medications:  Current Facility-Administered Medications   Medication Dose Route Frequency    multi-electrolyte (PLASMALYTE-A/ISOLYTE-S PH 7 4) IV solution  50 mL/hr Intravenous Continuous     Home medications:  Prior to Admission medications    Medication Sig Start Date End Date Taking? Authorizing Provider   aspirin 81 MG tablet Take 1 tablet by mouth daily 12   Historical Provider, MD     Allergies:  No Known Allergies    ROS:   Review of Systems   Constitutional: Negative for appetite change, fatigue and fever  HENT: Negative for congestion, rhinorrhea, sore throat and trouble swallowing  Eyes: Negative for photophobia, pain and visual disturbance  Respiratory: Negative for cough, chest tightness and shortness of breath  Cardiovascular: Positive for leg swelling  Negative for chest pain and palpitations  Gastrointestinal: Negative for abdominal pain, nausea and vomiting  Genitourinary: Negative for difficulty urinating, flank pain, frequency and urgency  Musculoskeletal: Negative for back pain, neck pain and neck stiffness  Skin: Positive for color change (erythema groin area)  Negative for pallor and wound  Neurological: Positive for speech difficulty (baseline?)  Negative for dizziness, light-headedness and headaches  Hematological: Negative for adenopathy  Does not bruise/bleed easily  Vitals:  Vitals:    19 1900 19 1915   BP: 133/76 159/74   BP Location: Right arm Right arm   Pulse: 74 62   Resp: 19 12   Temp:  97 9 °F (36 6 °C)   TempSrc:  Oral   SpO2: 99% 99%   Weight: 71 kg (156 lb 8 4 oz)    Height: 5' 9" (1 753 m)      Temperature:   Temp (24hrs), Av 2 °F (36 8 °C), Min:97 9 °F (36 6 °C), Max:98 4 °F (36 9 °C)    Current Temperature: 97 9 °F (36 6 °C)    Weights:   IBW: 70 7 kg  Body mass index is 23 11 kg/m²         Non-Invasive/Invasive Ventilation Settings:  Respiratory    Lab Data (Last 4 hours)    None         O2/Vent Data (Last 4 hours)    None              No results found for: PHART, QUV6HGL, PO2ART, OJP6YLJ, Z9KCUNAR, BEART, SOURCE       Physical Exam:  Physical Exam   Constitutional:   Thin, elderly appearing male in no acute distress   HENT:   Head: Normocephalic  Mouth/Throat: Oropharynx is clear and moist    No evidence of trauma   Eyes: Pupils are equal, round, and reactive to light  Conjunctivae and EOM are normal  Right eye exhibits no discharge  Left eye exhibits no discharge  Neck: Normal range of motion  Neck supple  nontender   Cardiovascular: Normal rate and intact distal pulses  Irregular rhythm   Pulmonary/Chest: Effort normal  No respiratory distress  He has no wheezes  He exhibits no tenderness  Abdominal: Soft  There is no tenderness  There is no rebound and no guarding  Musculoskeletal: He exhibits edema (R>L)  He exhibits no tenderness or deformity  Neurological: He is alert  No cranial nerve deficit or sensory deficit  He exhibits normal muscle tone  Oriented to self and location  Follows commands, strength 5/5 all four extremities, sensation intact  Some difficulty with fluency of speech but uncertain if this is baseline  Pt able to answer questions about where he lives, who he lives with, etc  However, he has no idea why he is in the hospital or what happened today  Skin: Skin is warm and dry  Capillary refill takes less than 2 seconds  He is not diaphoretic  Nursing note and vitals reviewed        Labs:  Results from last 7 days   Lab Units 09/22/19  1606   WBC Thousand/uL 8 08   HEMOGLOBIN g/dL 13 8   HEMATOCRIT % 41 0   PLATELETS Thousands/uL 171   NEUTROS PCT % 67   MONOS PCT % 7     Results from last 7 days   Lab Units 09/22/19  1606   SODIUM mmol/L 139   POTASSIUM mmol/L 4 3   CHLORIDE mmol/L 105   CO2 mmol/L 24   ANION GAP mmol/L 10   BUN mg/dL 33*   CREATININE mg/dL 2 14*   CALCIUM mg/dL 8 8   GLUCOSE RANDOM mg/dL 97          Results from last 7 days   Lab Units 09/22/19  1750   INR  1 14   PTT seconds 32          Results from last 7 days   Lab Units 09/22/19  1606   LACTIC ACID mmol/L 1  3     ABG:    VBG:            Imaging: Ct Stroke Alert Brain    Result Date: 9/22/2019  Impression: Suspect left central sulcus subarachnoid hemorrhage  Possible right frontal subarachnoid hemorrhage Noncontrast brain MRI recommended for further evaluation  Findings were directly discussed with Jose Juan Yen on 9/22/2019 4:13 PM  Workstation performed: [id number]     Cta Stroke Alert (head/neck)    Result Date: 9/22/2019  Impression: No evidence of hemodynamic significant stenosis, aneurysm or dissection  Debris within the trachea possibly aspirational  Findings were directly discussed with Jose Juan Yen on 9/22/2019 4:20 PM  Workstation performed: [id number]    I have personally reviewed pertinent reports  and I have personally reviewed pertinent films in PACS    Micro:          VTE Pharmacologic Prophylaxis: none- SAH  VTE Mechanical Prophylaxis: sequential compression device    Invasive lines and devices: Invasive Devices     Peripheral Intravenous Line            Peripheral IV 09/22/19 Left Forearm less than 1 day    Peripheral IV 09/22/19 Right Forearm less than 1 day          Drain            Urethral Catheter 16 Fr  less than 1 day                Code Status: Level 3 - DNAR and DNI  POA:    POLST:      Given critical illness, patient length of stay will require greater than two midnights        Adry Cuellar MD

## 2019-09-23 ENCOUNTER — APPOINTMENT (INPATIENT)
Dept: NON INVASIVE DIAGNOSTICS | Facility: HOSPITAL | Age: 84
DRG: 082 | End: 2019-09-23
Payer: MEDICARE

## 2019-09-23 ENCOUNTER — APPOINTMENT (INPATIENT)
Dept: RADIOLOGY | Facility: HOSPITAL | Age: 84
DRG: 082 | End: 2019-09-23
Payer: MEDICARE

## 2019-09-23 PROBLEM — R47.01 APHASIA: Status: ACTIVE | Noted: 2019-09-23

## 2019-09-23 LAB
ANION GAP SERPL CALCULATED.3IONS-SCNC: 6 MMOL/L (ref 4–13)
ATRIAL RATE: 60 BPM
ATRIAL RATE: 63 BPM
BUN SERPL-MCNC: 29 MG/DL (ref 5–25)
CALCIUM SERPL-MCNC: 8 MG/DL (ref 8.3–10.1)
CHLORIDE SERPL-SCNC: 108 MMOL/L (ref 100–108)
CHOLEST SERPL-MCNC: 102 MG/DL (ref 50–200)
CO2 SERPL-SCNC: 25 MMOL/L (ref 21–32)
CREAT SERPL-MCNC: 1.85 MG/DL (ref 0.6–1.3)
EST. AVERAGE GLUCOSE BLD GHB EST-MCNC: 120 MG/DL
GFR SERPL CREATININE-BSD FRML MDRD: 30 ML/MIN/1.73SQ M
GLUCOSE SERPL-MCNC: 108 MG/DL (ref 65–140)
HBA1C MFR BLD: 5.8 % (ref 4.2–6.3)
HDLC SERPL-MCNC: 46 MG/DL (ref 40–60)
LDLC SERPL CALC-MCNC: 40 MG/DL (ref 0–100)
MAGNESIUM SERPL-MCNC: 2.1 MG/DL (ref 1.6–2.6)
NONHDLC SERPL-MCNC: 56 MG/DL
P AXIS: 1 DEGREES
POTASSIUM SERPL-SCNC: 3.6 MMOL/L (ref 3.5–5.3)
PR INTERVAL: 171 MS
QRS AXIS: -44 DEGREES
QRS AXIS: -51 DEGREES
QRSD INTERVAL: 112 MS
QRSD INTERVAL: 125 MS
QT INTERVAL: 440 MS
QT INTERVAL: 454 MS
QTC INTERVAL: 454 MS
QTC INTERVAL: 464 MS
SODIUM SERPL-SCNC: 139 MMOL/L (ref 136–145)
T WAVE AXIS: -30 DEGREES
T WAVE AXIS: -4 DEGREES
TRIGL SERPL-MCNC: 80 MG/DL
TSH SERPL DL<=0.05 MIU/L-ACNC: 2.02 UIU/ML (ref 0.36–3.74)
VENTRICULAR RATE: 60 BPM
VENTRICULAR RATE: 67 BPM

## 2019-09-23 PROCEDURE — 80048 BASIC METABOLIC PNL TOTAL CA: CPT | Performed by: EMERGENCY MEDICINE

## 2019-09-23 PROCEDURE — 97167 OT EVAL HIGH COMPLEX 60 MIN: CPT

## 2019-09-23 PROCEDURE — 70030 X-RAY EYE FOR FOREIGN BODY: CPT

## 2019-09-23 PROCEDURE — 93010 ELECTROCARDIOGRAM REPORT: CPT | Performed by: INTERNAL MEDICINE

## 2019-09-23 PROCEDURE — 97163 PT EVAL HIGH COMPLEX 45 MIN: CPT

## 2019-09-23 PROCEDURE — 93306 TTE W/DOPPLER COMPLETE: CPT | Performed by: INTERNAL MEDICINE

## 2019-09-23 PROCEDURE — 93005 ELECTROCARDIOGRAM TRACING: CPT

## 2019-09-23 PROCEDURE — G8987 SELF CARE CURRENT STATUS: HCPCS

## 2019-09-23 PROCEDURE — 93306 TTE W/DOPPLER COMPLETE: CPT

## 2019-09-23 PROCEDURE — 80061 LIPID PANEL: CPT | Performed by: EMERGENCY MEDICINE

## 2019-09-23 PROCEDURE — 99223 1ST HOSP IP/OBS HIGH 75: CPT | Performed by: PSYCHIATRY & NEUROLOGY

## 2019-09-23 PROCEDURE — 70450 CT HEAD/BRAIN W/O DYE: CPT

## 2019-09-23 PROCEDURE — G8988 SELF CARE GOAL STATUS: HCPCS

## 2019-09-23 PROCEDURE — 83036 HEMOGLOBIN GLYCOSYLATED A1C: CPT | Performed by: EMERGENCY MEDICINE

## 2019-09-23 PROCEDURE — 83735 ASSAY OF MAGNESIUM: CPT | Performed by: EMERGENCY MEDICINE

## 2019-09-23 PROCEDURE — 99233 SBSQ HOSP IP/OBS HIGH 50: CPT | Performed by: ANESTHESIOLOGY

## 2019-09-23 PROCEDURE — G8978 MOBILITY CURRENT STATUS: HCPCS

## 2019-09-23 PROCEDURE — G8979 MOBILITY GOAL STATUS: HCPCS

## 2019-09-23 PROCEDURE — 84443 ASSAY THYROID STIM HORMONE: CPT | Performed by: EMERGENCY MEDICINE

## 2019-09-23 RX ORDER — SODIUM CHLORIDE, SODIUM GLUCONATE, SODIUM ACETATE, POTASSIUM CHLORIDE, MAGNESIUM CHLORIDE, SODIUM PHOSPHATE, DIBASIC, AND POTASSIUM PHOSPHATE .53; .5; .37; .037; .03; .012; .00082 G/100ML; G/100ML; G/100ML; G/100ML; G/100ML; G/100ML; G/100ML
50 INJECTION, SOLUTION INTRAVENOUS CONTINUOUS
Status: DISPENSED | OUTPATIENT
Start: 2019-09-23 | End: 2019-09-24

## 2019-09-23 RX ORDER — LABETALOL 20 MG/4 ML (5 MG/ML) INTRAVENOUS SYRINGE
5 EVERY 6 HOURS PRN
Status: DISCONTINUED | OUTPATIENT
Start: 2019-09-23 | End: 2019-09-25

## 2019-09-23 RX ORDER — SODIUM CHLORIDE, SODIUM GLUCONATE, SODIUM ACETATE, POTASSIUM CHLORIDE, MAGNESIUM CHLORIDE, SODIUM PHOSPHATE, DIBASIC, AND POTASSIUM PHOSPHATE .53; .5; .37; .037; .03; .012; .00082 G/100ML; G/100ML; G/100ML; G/100ML; G/100ML; G/100ML; G/100ML
500 INJECTION, SOLUTION INTRAVENOUS ONCE
Status: COMPLETED | OUTPATIENT
Start: 2019-09-23 | End: 2019-09-23

## 2019-09-23 RX ORDER — POTASSIUM CHLORIDE 20 MEQ/1
40 TABLET, EXTENDED RELEASE ORAL ONCE
Status: COMPLETED | OUTPATIENT
Start: 2019-09-23 | End: 2019-09-23

## 2019-09-23 RX ADMIN — SODIUM CHLORIDE, SODIUM GLUCONATE, SODIUM ACETATE, POTASSIUM CHLORIDE AND MAGNESIUM CHLORIDE 500 ML: 526; 502; 368; 37; 30 INJECTION, SOLUTION INTRAVENOUS at 01:15

## 2019-09-23 RX ADMIN — NYSTATIN: 100000 POWDER TOPICAL at 17:46

## 2019-09-23 RX ADMIN — POTASSIUM CHLORIDE 40 MEQ: 1500 TABLET, EXTENDED RELEASE ORAL at 09:34

## 2019-09-23 RX ADMIN — NYSTATIN: 100000 POWDER TOPICAL at 09:34

## 2019-09-23 RX ADMIN — LABETALOL 20 MG/4 ML (5 MG/ML) INTRAVENOUS SYRINGE 5 MG: at 21:23

## 2019-09-23 RX ADMIN — SODIUM CHLORIDE, SODIUM GLUCONATE, SODIUM ACETATE, POTASSIUM CHLORIDE, MAGNESIUM CHLORIDE, SODIUM PHOSPHATE, DIBASIC, AND POTASSIUM PHOSPHATE 50 ML/HR: .53; .5; .37; .037; .03; .012; .00082 INJECTION, SOLUTION INTRAVENOUS at 17:45

## 2019-09-23 NOTE — PLAN OF CARE
Problem: PHYSICAL THERAPY ADULT  Goal: Performs mobility at highest level of function for planned discharge setting  See evaluation for individualized goals  Description  Treatment/Interventions: Functional transfer training, LE strengthening/ROM, Therapeutic exercise, Endurance training, Bed mobility, Gait training, Spoke to nursing, Spoke to case management, OT          See flowsheet documentation for full assessment, interventions and recommendations  Note:   Prognosis: Good  Problem List: Decreased strength, Decreased endurance, Impaired balance, Decreased mobility, Decreased coordination, Decreased cognition, Impaired judgement, Decreased safety awareness, Impaired hearing  Assessment: Pt is a 79 yo male presenting to [facility]"" from [facility] on 9/22/19 as a stroke alert  CT showed L central sulcus SAH and possible R frontal SAH  Pt  has a past medical history of Arthritis, Athscl heart disease of native coronary artery w/o ang pctrs, Coronary angioplasty status, HTN (hypertension), echocardiogram, Hyperlipidemia, Right bundle branch block, and Ventricular premature depolarization  Pt is supine at start of session and agreeable to therapy  Pt is limited by hearing impairment and cognition  Pt presents with slowed processing and requires frequent cues for attention to task  Pt transferred supine to sit with Bob  Pt transferred sit <> stand with modA x1 and RW  Pt initially retropulsive, requiring cues for anterior weight shift  Pt ambulated 3 ft to bedside chair with Bob x2 and RW  Pt presents with slowed, shuffled govind and required frequent cues for proper weight shifting and sequencing  Pt remained seated in bedside chair with chair alarm in place and all needs within reach  PT to recommend inpt rehab to maximize safety and independence with functional mobility   PT to follow        Recommendation: (S) Post acute IP rehab     PT - OK to Discharge: (S) Yes(To inpt rehab when medically stable)    See flowsheet documentation for full assessment        Lizandro Love, PT, DPT

## 2019-09-23 NOTE — PROGRESS NOTES
Patient report has been called to Jason Pak RN from the [address] [place] [address] 6 Nursing unit  The patient will be transferred to Room 634 as a Stepdown Level 2 status patient  The patient's family is aware

## 2019-09-23 NOTE — SOCIAL WORK
CM placed call to pts son, Betty Scott, to review the CM role and discuss possible dc needs  Pt lives in a two story home with a first floor set up in [place], [place]  Pt requires moderate assistance from his son/family to bathe and dress  Pts son and daughter in law live next door and are readily available  Pts son reports his father has not taken his medications "in decades"  Pt has DME of a cane and a walker and is ambulatory without assistance  Pts son denies any history of drug/etoh abuse, mental illness or inpatient psych admissions  Pt denies any history of SNF/Rehab or VNA  Pts son, Betty Scott, is the POA  Pt has a living will  Pt is a Jewish, as per son, pt does not wish to have any blood products  Preferred Pharmacy: pt has not used a pharmacy in years, as per son  Contact: Mike Julio, son, [phone]  PCP: Dr Aman Agrawal    CM reviewed d/c planning process including the following: identifying help at home, patient preference for d/c planning needs, Discharge unge, [facility] Meds to Bed program, availability of treatment team to discuss questions or concerns patient and/or family may have regarding understanding medications and recognizing signs and symptoms once discharged  CM also encouraged patient to follow up with all recommended appointments after discharge  Patient advised of importance for patient and family to participate in managing patients medical well being

## 2019-09-23 NOTE — PLAN OF CARE
Problem: Prexisting or High Potential for Compromised Skin Integrity  Goal: Skin integrity is maintained or improved  Description  INTERVENTIONS:  - Identify patients at risk for skin breakdown  - Assess and monitor skin integrity  - Assess and monitor nutrition and hydration status  - Monitor labs   - Assess for incontinence   - Turn and reposition patient  - Assist with mobility/ambulation  - Relieve pressure over bony prominences  - Avoid friction and shearing  - Provide appropriate hygiene as needed including keeping skin clean and dry  - Evaluate need for skin moisturizer/barrier cream  - Collaborate with interdisciplinary team   - Patient/family teaching  - Consider wound care consult   Outcome: Progressing     Problem: Neurological Deficit  Goal: Neurological status is stable or improving  Description  Interventions:  - Monitor and assess patient's level of consciousness, motor function, sensory function, and level of assistance needed for ADLs  - Monitor and report changes from baseline  Collaborate with interdisciplinary team to initiate plan and implement interventions as ordered  - Provide and maintain a safe environment  - Consider seizure precautions  - Consider fall precautions  - Consider aspiration precautions  - Consider bleeding precautions  Outcome: Progressing     Problem: Activity Intolerance/Impaired Mobility  Goal: Mobility/activity is maintained at optimum level for patient  Description  Interventions:  - Assess and monitor patient  barriers to mobility and need for assistive/adaptive devices  - Assess patient's emotional response to limitations  - Collaborate with interdisciplinary team and initiate plans and interventions as ordered  - Encourage independent activity per ability   - Maintain proper body alignment  - Perform active/passive rom as tolerated/ordered    - Plan activities to conserve energy   - Turn patient as appropriate  Outcome: Progressing     Problem: Communication Impairment  Goal: Ability to express needs and understand communication  Description  Assess patient's communication skills and ability to understand information  Patient will demonstrate use of effective communication techniques, alternative methods of communication and understanding even if not able to speak  - Encourage communication and provide alternate methods of communication as needed  - Collaborate with case management/ for discharge needs  - Include patient/family/caregiver in decisions related to communication  Outcome: Progressing     Problem: Potential for Aspiration  Goal: Non-ventilated patient's risk of aspiration is minimized  Description  Assess and monitor vital signs, respiratory status, and labs (WBC)  Monitor for signs of aspiration (tachypnea, cough, rales, wheezing, cyanosis, fever)  - Assess and monitor patient's ability to swallow  - Place patient up in chair to eat if possible  - HOB up at 90 degrees to eat if unable to get patient up into chair   - Supervise patient during oral intake  - Instruct patient/ family to take small bites  - Instruct patient/ family to take small single sips when taking liquids  - Follow patient-specific strategies generated by speech pathologist   Outcome: Progressing     Problem: Nutrition  Goal: Nutrition/Hydration status is improving  Description  Monitor and assess patient's nutrition/hydration status for malnutrition (ex- brittle hair, bruises, dry skin, pale skin and conjunctiva, muscle wasting, smooth red tongue, and disorientation)  Collaborate with interdisciplinary team and initiate plan and interventions as ordered  Monitor patient's weight and dietary intake as ordered or per policy  Utilize nutrition screening tool and intervene per policy  Determine patient's food preferences and provide high-protein, high-caloric foods as appropriate       - Assist patient with eating   - Allow adequate time for meals   - Encourage patient to take dietary supplement as ordered  - Collaborate with clinical nutritionist   - Include patient/family/caregiver in decisions related to nutrition  Outcome: Progressing     Problem: NEUROSENSORY - ADULT  Goal: Achieves stable or improved neurological status  Description  INTERVENTIONS  - Monitor and report changes in neurological status  - Monitor vital signs such as temperature, blood pressure, glucose, and any other labs ordered   - Initiate measures to prevent increased intracranial pressure  - Monitor for seizure activity and implement precautions if appropriate      Outcome: Progressing  Goal: Achieves maximal functionality and self care  Description  INTERVENTIONS  - Monitor swallowing and airway patency with patient fatigue and changes in neurological status  - Encourage and assist patient to increase activity and self care     - Encourage visually impaired, hearing impaired and aphasic patients to use assistive/communication devices  Outcome: Progressing     Problem: CARDIOVASCULAR - ADULT  Goal: Maintains optimal cardiac output and hemodynamic stability  Description  INTERVENTIONS:  - Monitor I/O, vital signs and rhythm  - Monitor for S/S and trends of decreased cardiac output  - Administer and titrate ordered vasoactive medications to optimize hemodynamic stability  - Assess quality of pulses, skin color and temperature  - Assess for signs of decreased coronary artery perfusion  - Instruct patient to report change in severity of symptoms  Outcome: Progressing  Goal: Absence of cardiac dysrhythmias or at baseline rhythm  Description  INTERVENTIONS:  - Continuous cardiac monitoring, vital signs, obtain 12 lead EKG if ordered  - Administer antiarrhythmic and heart rate control medications as ordered  - Monitor electrolytes and administer replacement therapy as ordered  Outcome: Progressing     Problem: GASTROINTESTINAL - ADULT  Goal: Maintains or returns to baseline bowel function  Description  INTERVENTIONS:  - Assess bowel function  - Encourage oral fluids to ensure adequate hydration  - Administer IV fluids if ordered to ensure adequate hydration  - Administer ordered medications as needed  - Encourage mobilization and activity  - Consider nutritional services referral to assist patient with adequate nutrition and appropriate food choices  Outcome: Progressing  Goal: Maintains adequate nutritional intake  Description  INTERVENTIONS:  - Monitor percentage of each meal consumed  - Identify factors contributing to decreased intake, treat as appropriate  - Assist with meals as needed  - Monitor I&O, weight, and lab values if indicated  - Obtain nutrition services referral as needed  Outcome: Progressing     Problem: GENITOURINARY - ADULT  Goal: Urinary catheter remains patent  Description  INTERVENTIONS:  - Assess patency of urinary catheter  - If patient has a chronic barrera, consider changing catheter if non-functioning  - Follow guidelines for intermittent irrigation of non-functioning urinary catheter  Outcome: Progressing     Problem: METABOLIC, FLUID AND ELECTROLYTES - ADULT  Goal: Electrolytes maintained within normal limits  Description  INTERVENTIONS:  - Monitor labs and assess patient for signs and symptoms of electrolyte imbalances  - Administer electrolyte replacement as ordered  - Monitor response to electrolyte replacements, including repeat lab results as appropriate  - Instruct patient on fluid and nutrition as appropriate  Outcome: Progressing  Goal: Fluid balance maintained  Description  INTERVENTIONS:  - Monitor labs   - Monitor I/O and WT  - Instruct patient on fluid and nutrition as appropriate  - Assess for signs & symptoms of volume excess or deficit  Outcome: Progressing     Problem: SKIN/TISSUE INTEGRITY - ADULT  Goal: Skin integrity remains intact  Description  INTERVENTIONS  - Identify patients at risk for skin breakdown  - Assess and monitor skin integrity  - Assess and monitor nutrition and hydration status  - Monitor labs (i e  albumin)  - Assess for incontinence   - Turn and reposition patient  - Assist with mobility/ambulation  - Relieve pressure over bony prominences  - Avoid friction and shearing  - Provide appropriate hygiene as needed including keeping skin clean and dry  - Evaluate need for skin moisturizer/barrier cream  - Collaborate with interdisciplinary team (i e  Nutrition, Rehabilitation, etc )   - Patient/family teaching  Outcome: Progressing     Problem: HEMATOLOGIC - ADULT  Goal: Maintains hematologic stability  Description  INTERVENTIONS  - Assess for signs and symptoms of bleeding or hemorrhage  - Monitor labs  - Administer supportive blood products/factors as ordered and appropriate  Outcome: Progressing     Problem: MUSCULOSKELETAL - ADULT  Goal: Maintain or return mobility to safest level of function  Description  INTERVENTIONS:  - Assess patient's ability to carry out ADLs; assess patient's baseline for ADL function and identify physical deficits which impact ability to perform ADLs (bathing, care of mouth/teeth, toileting, grooming, dressing, etc )  - Assess/evaluate cause of self-care deficits   - Assess range of motion  - Assess patient's mobility  - Assess patient's need for assistive devices and provide as appropriate  - Encourage maximum independence but intervene and supervise when necessary  - Involve family in performance of ADLs  - Assess for home care needs following discharge   - Consider OT consult to assist with ADL evaluation and planning for discharge  - Provide patient education as appropriate  Outcome: Progressing     Problem: PAIN - ADULT  Goal: Verbalizes/displays adequate comfort level or baseline comfort level  Description  Interventions:  - Encourage patient to monitor pain and request assistance  - Assess pain using appropriate pain scale  - Administer analgesics based on type and severity of pain and evaluate response  - Implement non-pharmacological measures as appropriate and evaluate response  - Consider cultural and social influences on pain and pain management  - Notify physician/advanced practitioner if interventions unsuccessful or patient reports new pain  Outcome: Progressing     Problem: INFECTION - ADULT  Goal: Absence or prevention of progression during hospitalization  Description  INTERVENTIONS:  - Assess and monitor for signs and symptoms of infection  - Monitor lab/diagnostic results  - Monitor all insertion sites, i e  indwelling lines, tubes, and drains  - Monitor endotracheal if appropriate and nasal secretions for changes in amount and color  - [place] appropriate cooling/warming therapies per order  - Administer medications as ordered  - Instruct and encourage patient and family to use good hand hygiene technique  - Identify and instruct in appropriate isolation precautions for identified infection/condition  Outcome: Progressing     Problem: SAFETY ADULT  Goal: Patient will remain free of falls  Description  INTERVENTIONS:  - Assess patient frequently for physical needs  -  Identify cognitive and physical deficits and behaviors that affect risk of falls    -  [place] fall precautions as indicated by assessment   - Educate patient/family on patient safety including physical limitations  - Instruct patient to call for assistance with activity based on assessment  - Modify environment to reduce risk of injury  - Consider OT/PT consult to assist with strengthening/mobility  Outcome: Progressing  Goal: Maintain or return to baseline ADL function  Description  INTERVENTIONS:  -  Assess patient's ability to carry out ADLs; assess patient's baseline for ADL function and identify physical deficits which impact ability to perform ADLs (bathing, care of mouth/teeth, toileting, grooming, dressing, etc )  - Assess/evaluate cause of self-care deficits   - Assess range of motion  - Assess patient's mobility; develop plan if impaired  - Assess patient's need for assistive devices and provide as appropriate  - Encourage maximum independence but intervene and supervise when necessary  - Involve family in performance of ADLs  - Assess for home care needs following discharge   - Consider OT consult to assist with ADL evaluation and planning for discharge  - Provide patient education as appropriate  Outcome: Progressing  Goal: Maintain or return mobility status to optimal level  Description  INTERVENTIONS:  - Assess patient's baseline mobility status (ambulation, transfers, stairs, etc )    - Identify cognitive and physical deficits and behaviors that affect mobility  - Identify mobility aids required to assist with transfers and/or ambulation (gait belt, sit-to-stand, lift, walker, cane, etc )  - [place] fall precautions as indicated by assessment  - Record patient progress and toleration of activity level on Mobility SBAR; progress patient to next Phase/Stage  - Instruct patient to call for assistance with activity based on assessment  - Consider rehabilitation consult to assist with strengthening/weightbearing, etc   Outcome: Progressing     Problem: DISCHARGE PLANNING  Goal: Discharge to home or other facility with appropriate resources  Description  INTERVENTIONS:  - Identify barriers to discharge w/patient and caregiver  - Arrange for needed discharge resources and transportation as appropriate  - Identify discharge learning needs (meds, wound care, etc )  - Arrange for interpretive services to assist at discharge as needed  - Refer to Case Management Department for coordinating discharge planning if the patient needs post-hospital services based on physician/advanced practitioner order or complex needs related to functional status, cognitive ability, or social support system  Outcome: Progressing     Problem: Knowledge Deficit  Goal: Patient/family/caregiver demonstrates understanding of disease process, treatment plan, medications, and discharge instructions  Description  Complete learning assessment and assess knowledge base    Interventions:  - Provide teaching at level of understanding  - Provide teaching via preferred learning methods  Outcome: Progressing

## 2019-09-23 NOTE — CONSULTS
Consultation - Neurology   Carlos Manuel Webber 80 y o  male MRN: [id number]  Unit/Bed#: ICU 11 Encounter: [id number]      Assessment/Plan   Assessment:  SAH (Subarachnoid Hemorrhage) ([facility] [address] ); likely traumatic    Plan:  -MRI-brain without contrast (due to FRANCOIS)  -EEG to ensure no focal seizures as patient waxes and wanes  -maintain systolic BP < 517 (avoid hypotension to prevent watershed strokes)  -continue supportive care and medical management per primary team, contact if changes  History of Present Illness     Reason for Consult / Principal Problem: Subarachnoid Hemorrhage  Hx and PE limited by: N/A    HPI: Carlos Manuel Webber is a 80 y o  right handed male with a PMH of HTN, CAD (s/p PCI with stent 15 yrs ago) and HLD who was brought it via EMS to [facility] ED on 09/22 with a chief complaint of AMS  His family states they called EMS when they noticed "he was not answering any questions, and was acting confused " Stroke alert was activated for expressive and receptive Aphasia  On Neuro assessment, pt had a NIHSS 8 for failure to follow commands, answer orientation questions, aphasia and drift in L  Leg  Head-CT showed a L  Central sulcus SAH and possible R  Frontal SAH  Head/Neck CTA ruled out any stenosis, aneurysm or dissection  The pt was [facility] to [facility] for EEG monitoring  The patient's son reports random non-responsiveness with blank stares starting 2-3 weeks ago and word-searching for the past couple of months  The patient lives at home with his wife  He ambulates with a walker and cane and reports multiple recent falls, most recent yesterday  He denies any history of seizures  Today the patient has no focal deficits, he is following commands with intact strength and sensation in all extremities  He does continue to have some expressive aphasia with reduced concentration and attention  Review of Systems   Constitutional: Positive for activity change  Negative for chills and fever  HENT: Positive for hearing loss  Negative for drooling, sore throat and trouble swallowing  Eyes: Negative for visual disturbance  Respiratory: Negative for cough, chest tightness and shortness of breath  Cardiovascular: Positive for leg swelling  Negative for chest pain  Gastrointestinal: Negative for abdominal pain, constipation, diarrhea, nausea and vomiting  Musculoskeletal: Positive for gait problem  Skin: Negative for rash  Neurological: Positive for speech difficulty and weakness  Negative for dizziness, tremors, seizures, syncope, facial asymmetry, light-headedness, numbness and headaches  Psychiatric/Behavioral: Negative for agitation and confusion  Historical Information   Past Medical History:   Diagnosis Date    Arthritis     Athscl heart disease of native coronary artery w/o ang pctrs     Coronary angioplasty status     HTN (hypertension)     Hx of echocardiogram 04/06/2012    EF 0 65, Normal LV function   Hyperlipidemia     Right bundle branch block     Ventricular premature depolarization      Past Surgical History:   Procedure Laterality Date    CARDIAC CATHETERIZATION  04/04/2012    Single vessel CAD; Successful bare metal stent placed in the distal RCA     Social History   Social History     Substance and Sexual Activity   Alcohol Use No     Social History     Substance and Sexual Activity   Drug Use Never     Social History     Tobacco Use   Smoking Status Never Smoker   Smokeless Tobacco Never Used     Family History: non-contributory    Review of previous medical records was  completed  Meds/Allergies   all current active meds have been reviewed    No Known Allergies    Objective   Vitals:Blood pressure 109/55, pulse 68, temperature 98 1 °F (36 7 °C), temperature source Oral, resp  rate 15, height 5' 9" (1 753 m), weight 73 2 kg (161 lb 6 oz), SpO2 97 %  ,Body mass index is 23 83 kg/m²      Intake/Output Summary (Last 24 hours) at 9/23/2019 0956  Last data filed at 9/23/2019 0925  Gross per 24 hour   Intake 1851 66 ml   Output 4100 ml   Net -2248 34 ml       Invasive Devices: Invasive Devices     Peripheral Intravenous Line            Peripheral IV 09/22/19 Left Forearm less than 1 day    Peripheral IV 09/22/19 Right Forearm less than 1 day          Drain            Urethral Catheter 16 Fr  less than 1 day                Physical Exam   Constitutional: He appears well-developed and well-nourished  HENT:   Head: Normocephalic and atraumatic  Eyes: Pupils are equal, round, and reactive to light  EOM are normal    Neck: Normal range of motion  Neck supple  Cardiovascular: Normal rate, regular rhythm and intact distal pulses  Pulmonary/Chest: Effort normal and breath sounds normal    Abdominal: Soft  Bowel sounds are normal    Musculoskeletal: Normal range of motion  Neurological: He is alert  A cranial nerve deficit (CN 8 ) is present  Pt not oriented to person, time or place   Skin: Skin is warm and dry  Psychiatric: He has a normal mood and affect  His speech is slurred  Neurologic Exam     Mental Status   Oriented to person  Oriented to place  Disoriented to time  Disoriented to year, month and date  Attention: decreased  Concentration: decreased  Speech: slurred (Patient has expressive Aphasia)  Level of consciousness: alert  Able to name object  Able to read  Cranial Nerves     CN II   Visual acuity: normal  Right visual field deficit: none    CN III, IV, VI   Pupils are equal, round, and reactive to light  Extraocular motions are normal    CN III: no CN III palsy  CN VI: no CN VI palsy    CN V   Facial sensation intact  CN VII   Facial expression full, symmetric       CN VIII   Hearing: impaired    CN XI   Right sternocleidomastoid strength: normal  Left sternocleidomastoid strength: normal  Right trapezius strength: normal  Left trapezius strength: normal    CN XII   Tongue: not atrophic  Fasciculations: absent  Tongue deviation: none    Motor Exam   Muscle bulk: normal  Overall muscle tone: normal  Right arm tone: normal  Left arm tone: normal  Right leg tone: normal  Left leg tone: normalStrength 5/5 except for RLE (4/5)     Sensory Exam   Light touch normal    Vibration normal    Pinprick normal      Gait, Coordination, and Reflexes 1+/4 Diffuse hyporeflexia except LUE 2+/4       Lab Results:   I have personally reviewed pertinent reports  , CBC:   Results from last 7 days   Lab Units 09/22/19  1606   WBC Thousand/uL 8 08   RBC Million/uL 4 42   HEMOGLOBIN g/dL 13 8   HEMATOCRIT % 41 0   MCV fL 93   PLATELETS Thousands/uL 171   , BMP/CMP:   Results from last 7 days   Lab Units 09/23/19  0451 09/22/19  1606   SODIUM mmol/L 139 139   POTASSIUM mmol/L 3 6 4 3   CHLORIDE mmol/L 108 105   CO2 mmol/L 25 24   BUN mg/dL 29* 33*   CREATININE mg/dL 1 85* 2 14*   CALCIUM mg/dL 8 0* 8 8   EGFR ml/min/1 73sq m 30 25   , Vitamin B12:   , HgBA1C:   Results from last 7 days   Lab Units 09/23/19  0451   HEMOGLOBIN A1C % 5 8   , TSH:   Results from last 7 days   Lab Units 09/23/19  0451   TSH 3RD GENERATON uIU/mL 2 020   , Coagulation:   Results from last 7 days   Lab Units 09/22/19  1750   INR  1 14   , Lipid Profile:   Results from last 7 days   Lab Units 09/23/19  0451   HDL mg/dL 46   LDL CALC mg/dL 40   TRIGLYCERIDES mg/dL 80   , Ammonia:   Results from last 7 days   Lab Units 09/22/19  1606   AMMONIA umol/L <10*   , Urinalysis:   Results from last 7 days   Lab Units 09/22/19  1751   COLOR UA  Yellow   CLARITY UA  Slightly Cloudy   SPEC GRAV UA  1 010   PH UA  6 0   LEUKOCYTES UA  Negative   NITRITE UA  Negative   GLUCOSE UA mg/dl Negative   KETONES UA mg/dl Negative   BILIRUBIN UA  Negative   BLOOD UA  Moderate*   , Drug Screen:   , Medication Drug Levels:       Invalid input(s): CARBAMAZEPINE,  PHENOBARB, LACOSAMIDE, OXCARBAZEPINE  Imaging Studies: I have personally reviewed pertinent reports      EKG, Pathology, and Other Studies: I have personally reviewed pertinent reports  VTE Prophylaxis: Sequential compression device Loretta Mathur     Code Status: Level 3 - DNAR and DNI  Advance Directive and Living Will:      Power of :    POLST:      Counseling / Coordination of Care  Total time spent today 45 minutes  Greater than 50% of total time was spent with the patient and / or family counseling and / or coordination of care   A description of the counseling / coordination of care: with patient and family

## 2019-09-23 NOTE — PHYSICAL THERAPY NOTE
Physical Therapy Evaluation     Patient's Name: Jorge Olivares    Admitting Diagnosis  Altered mental status [R41 [id number]]    Problem List  Patient Active Problem List   Diagnosis    Mixed hyperlipidemia    Essential hypertension    Coronary artery disease involving native coronary artery of native heart without angina pectoris    Bilateral leg edema    A-fib ([facility] [address] )    SAH (subarachnoid hemorrhage) ([facility] [address] )    Aphasia       Past Medical History  Past Medical History:   Diagnosis Date    Arthritis     Athscl heart disease of native coronary artery w/o ang pctrs     Coronary angioplasty status     HTN (hypertension)     Hx of echocardiogram 04/06/2012    EF 0 65, Normal LV function   Hyperlipidemia     Right bundle branch block     Ventricular premature depolarization        Past Surgical History  Past Surgical History:   Procedure Laterality Date    CARDIAC CATHETERIZATION  04/04/2012    Single vessel CAD; Successful bare metal stent placed in the distal RCA        09/23/19 1211   Note Type   Note type Eval only   Pain Assessment   Pain Assessment No/denies pain   Pain Score No Pain   Home Living   Type of Home House  (Ramped entrance)   Home Layout Two level; Able to live on main level with bedroom/bathroom; Ramped entrance   Home Equipment Walker;Cane  (Pt reports primary use of SPC)   Additional Comments Pt is a questionable historian, reports living in a 2 story home with a ramped entrance  Pt reports he could have a first floor setup   Prior Function   Level of [place] Independent with ADLs and functional mobility; Needs assistance with IADLs   Lives With Spouse   Receives Help From Family   ADL Assistance Independent   IADLs Needs assistance   Falls in the last 6 months >10  (Pt reports 50 falls)   Comments Pt is a questionable historian but reports he is modified independent with SPC   Pt reports >50 falls prior to admission   Restrictions/Precautions   Weight Bearing Precautions Per Order No   Other Precautions Cognitive; Chair Alarm; Bed Alarm;Multiple lines;Telemetry;O2;Fall Risk;Pain   General   Family/Caregiver Present No   Cognition   Overall Cognitive Status Impaired   Arousal/Participation Cooperative   Attention Attends with cues to redirect   Orientation Level Oriented to person;Oriented to place; Disoriented to time;Disoriented to situation   Following Commands Follows one step commands with increased time or repetition   RLE Assessment   RLE Assessment   (Grossly 4/5)   LLE Assessment   LLE Assessment   (Grossly 4/5)   Light Touch   RLE Light Touch Grossly intact   LLE Light Touch Grossly intact   Bed Mobility   Supine to Sit 4  Minimal assistance   Additional items Assist x 1; Increased time required;Verbal cues;LE management   Transfers   Sit to Stand 3  Moderate assistance   Additional items Assist x 1; Increased time required;Verbal cues   Stand to Sit 3  Moderate assistance   Additional items Assist x 1; Increased time required;Verbal cues   Ambulation/Elevation   Gait pattern Improper Weight shift;Narrow ANTHONY; Forward Flexion; Shuffling; Short stride; Step to;Excessively slow   Gait Assistance 4  Minimal assist   Additional items Assist x 2;Verbal cues; Tactile cues   Assistive Device Rolling walker   Distance 3 ft   Balance   Static Sitting Fair   Dynamic Sitting Fair -   Static Standing Poor +   Dynamic Standing Poor   Ambulatory Poor   Endurance Deficit   Endurance Deficit Yes   Endurance Deficit Description Fatigue, cognition   Activity Tolerance   Activity Tolerance Patient limited by fatigue   Medical Staff Made Aware OT   Nurse Made Aware Yes Patrica   Assessment   Prognosis Good   Problem List Decreased strength;Decreased endurance; Impaired balance;Decreased mobility; Decreased coordination;Decreased cognition; Impaired judgement;Decreased safety awareness; Impaired hearing   Assessment Pt is a 81 yo male presenting to [facility] from [address] on 9/22/19 as a stroke alert   CT showed L central sulcus SAH and possible R frontal SAH  Pt  has a past medical history of Arthritis, Athscl heart disease of native coronary artery w/o ang pctrs, Coronary angioplasty status, HTN (hypertension), echocardiogram, Hyperlipidemia, Right bundle branch block, and Ventricular premature depolarization  Pt is supine at start of session and agreeable to therapy  Pt is limited by hearing impairment and cognition  Pt presents with slowed processing and requires frequent cues for attention to task  Pt transferred supine to sit with Bob  Pt transferred sit <> stand with modA x1 and RW  Pt initially retropulsive, requiring cues for anterior weight shift  Pt ambulated 3 ft to bedside chair with Bob x2 and RW  Pt presents with slowed, shuffled govind and required frequent cues for proper weight shifting and sequencing  Pt remained seated in bedside chair with chair alarm in place and all needs within reach  PT to recommend inpt rehab to maximize safety and independence with functional mobility  PT to follow   Goals   Patient Goals To get up on his own   STG Expiration Date 10/07/19   Short Term Goal #1 In 10 sessions pt will: 1  Transfer supine <> sit with supervision  2  Transfer sit <> stand with Bob and LRAD  3  Ambulate >40 ft with Bob and LRAD  4  Increase muscle strength >1 grade  5  Increase activity tolerance >45 minutes   Treatment Day 0   Plan   Treatment/Interventions Functional transfer training;LE strengthening/ROM; Therapeutic exercise; Endurance training;Bed mobility;Gait training;Spoke to nursing;Spoke to case management;OT   PT Frequency   (3-6x/wk)   Recommendation   Recommendation Post acute IP rehab   PT - OK to Discharge Yes  (To inpt rehab when medically stable)   Modified Satinder Scale   Modified Ware Scale 4   Barthel Index   Feeding 10   Bathing 0   Grooming Score 5   Dressing Score 5   Bladder Score 10   Bowels Score 10   Toilet Use Score 5   Transfers (Bed/Chair) Score 5   Mobility (Level Surface) Score 0   Stairs Score 0   Barthel Index Score 50           Kiley Bray, PT, DPT

## 2019-09-23 NOTE — PLAN OF CARE
Problem: OCCUPATIONAL THERAPY ADULT  Goal: Performs self-care activities at highest level of function for planned discharge setting  See evaluation for individualized goals  Description  Treatment Interventions: ADL retraining, Functional transfer training, UE strengthening/ROM, Endurance training, Cognitive reorientation, Patient/family training, Equipment evaluation/education, Compensatory technique education, Continued evaluation, Energy conservation, Activityengagement          See flowsheet documentation for full assessment, interventions and recommendations  Note:   Limitation: Decreased ADL status, Decreased UE strength, Decreased Safe judgement during ADL, Decreased cognition, Decreased endurance, Visual deficit, Decreased self-care trans, Decreased high-level ADLs  Prognosis: Fair  Assessment: Pt is a 81 y/o male seen for OT eval s/p adm to [facility] as a transfer from [facility] where he initially presented w/ a stroke alert  CT showed L central sulcus SAH and possible R frontal SAH  Pt  has a past medical history of Arthritis, Athscl heart disease of native coronary artery w/o ang pctrs, Coronary angioplasty status, HTN (hypertension), echocardiogram, Hyperlipidemia, Right bundle branch block, and Ventricular premature depolarization  Pt with active OT orders and up with assistance  orders  Pt lives with spouse in [address] [place] [address] w/ ramped entrance, reports can have a 1st floor setup  Pt was I w/ ADLS and has IADLS, does not drive, & required use of DME PTA including a cane, RW, s/c, and grab bars  Pt is currently demonstrating the following occupational deficits: Min A UB ADLS, Mod A LB ADLS, Min A bed mobility, Mod A transfers and Min A x2 functional mobility w/ use of RW for steadying balance and ongoing repetitive VC for safety, initiating and sequencing task   These deficits that are impacting pt's baseline areas of occupation are a result of the following impairments: pain, endurance, activity tolerance, functional mobility, forward functional reach, balance, trunk control, functional standing tolerance, unsupportive home environment, decreased I w/ ADLS/IADLS, strength, ROM, visual deficits, cognitive impairments, decreased safety awareness and decreased insight into deficits  The following Occupational Performance Areas to address include: eating, grooming, bathing/shower, toilet hygiene, dressing, socialization, health maintenance, functional mobility, clothing management and household maintenance  Pt scored overall 50/100 on the Barthel Index  Based on the aforementioned OT evaluation, functional performance deficits, and assessments, pt has been identified as a high complexity evaluation  Recommend STR upon D/C,when medically stable   Pt to continue to benefit from acute immediate OT services to address the following goals 3-5x/week to  w/in 10-14 days:      OT Discharge Recommendation: Short Term Rehab  OT - OK to Discharge: Yes(when medically stable)     Lynn Shelton MS, OTR/L

## 2019-09-23 NOTE — OCCUPATIONAL THERAPY NOTE
[address]  Evaluation     Patient Name: Cathaleen Duane  HASMN'G Date: 9/23/2019  Problem List  Principal Problem:    SAH (subarachnoid hemorrhage) ([address] )  Active Problems:    Coronary artery disease involving native coronary artery of native heart without angina pectoris    Bilateral leg edema    A-fib (HCC)    Aphasia    Past Medical History  Past Medical History:   Diagnosis Date    Arthritis     Athscl heart disease of native coronary artery w/o ang pctrs     Coronary angioplasty status     HTN (hypertension)     Hx of echocardiogram 04/06/2012    EF 0 65, Normal LV function   Hyperlipidemia     Right bundle branch block     Ventricular premature depolarization      Past Surgical History  Past Surgical History:   Procedure Laterality Date    CARDIAC CATHETERIZATION  04/04/2012    Single vessel CAD; Successful bare metal stent placed in the distal RCA             09/23/19 1210   Note Type   Note type Eval/Treat   Restrictions/Precautions   Weight Bearing Precautions Per Order No   Other Precautions Cognitive; Chair Alarm; Bed Alarm;Multiple lines;Telemetry; Fall Risk;Pain;Hard of hearing;Visual impairment  ([employer])   Pain Assessment   Pain Assessment No/denies pain   Pain Score No Pain   Home Living   Type of [address] [place] [address] Two level;Ramped entrance; Able to live on main level with bedroom/bathroom; Performs ADLs on one level   Bathroom Shower/Tub Tub/shower unit   Bathroom Toilet Standard   Bathroom Equipment Shower chair;Grab bars in shower;Grab bars around toilet   Home Equipment Walker;Cane   Additional Comments Pt is a poor historian; does reports living in 2 , ramped entrance, 1st floor setup   Prior Function   Level of [place] Independent with ADLs and functional mobility; Needs assistance with IADLs   Lives With Spouse   Receives Help From Family   ADL Assistance Independent   IADLs Needs assistance   Falls in the last 6 months >10  (reports atleast 50 falls) Vocational Retired   Comments Pt reports being I w/ ADLS, has assist for IADLS and is Mod I w/ transfers and functional mobility PTA   Lifestyle   Autonomy I ADLS, assist IADLS, and Mod I w/ transfers and functional mobility PTA   Reciprocal Relationships Pt lives w/ spouse; has son and dght in law next door   Service to Others Pt is retired   Intrinsic Gratification Pt enjoys gardening   Psychosocial   Psychosocial (WDL) [address]  5  [address] [facility] 4  [facility] 4  Minimal Assistance   LB [address] 3  Moderate Assistance   [address] 4  [address] 3  Moderate [address]  3  Moderate Assistance   Functional Assistance 4  Minimal Assistance   Functional Deficit Steadying;Verbal cueing;Supervision/safety; Increased time to complete  (Ax2)   Bed Mobility   Supine to Sit 4  Minimal assistance   Additional items Assist x 1;HOB elevated; Increased time required;Verbal cues;LE management   Sit to Supine Unable to assess   Additional Comments pt went from supine to sit w/ Min A x1 for UB support and LE management, HOB elevated for assist, +VC to initiate task and for safety  Transfers   Sit to Stand 3  Moderate assistance   Additional items Assist x 1; Increased time required;Verbal cues   Stand to Sit 3  Moderate assistance   Additional items Assist x 1; Increased time required;Verbal cues   Additional Comments Pt performed sit-stand from EOB w/ Mod A x1 for moderate force production into standing and +VC for safety/proper technique  HHA used, SBA 2nd for safety/line management   Functional Mobility   Functional Mobility 4  Minimal assistance   Additional Comments Pt took few small steps from EOB to chair w/ Min A x2,RW for support in standing  Pt required repetitive +VC for safety, initiating and sequencing task   Required frequent re-direction   Additional items Rolling walker   Balance   Static Sitting Fair   Dynamic Sitting Fair -   Static Standing Poor   Dynamic Standing Poor +   Ambulatory Poor   Activity Tolerance   Activity Tolerance Patient limited by fatigue   Medical Staff Made Aware PT   Nurse Made Aware yes, Angela [place] Assessment   RUE Assessment X  (grossly 4-/5)   LUE Assessment   LUE Assessment X  (grossly 4-/5)   Hand Function   Gross Motor Coordination Functional   Fine Motor Coordination Functional   Sensation   Light Touch No apparent deficits   Vision-Basic Assessment   Current Vision Wears glasses all the time   Cognition   Overall Cognitive Status Impaired   Arousal/Participation Responsive; Cooperative   Attention Attends with cues to redirect   Orientation Level Oriented to person;Oriented to place; Disoriented to time;Disoriented to situation   Memory Decreased recall of precautions;Decreased recall of recent events;Decreased short term memory   Following Commands Follows one step commands with increased time or repetition   Comments Pt is pleasant and cooperative; is a poor historian; has decreased insight into deficits; decreased safety awareness; requires cues for safety and verbal cues to initiate and sequence task   Assessment   Limitation Decreased ADL status; Decreased UE strength;Decreased Safe judgement during ADL;Decreased cognition;Decreased endurance;Visual deficit; Decreased self-care trans;Decreased high-level ADLs   Prognosis Fair   Assessment Pt is a 79 y/o male seen for OT eval s/p adm to [facility] a [facility] he [facility] presented w/ a stroke alert  CT showed L central sulcus SAH and possible R frontal SAH  Pt  has a past medical history of Arthritis, Athscl heart disease of native coronary artery w/o ang pctrs, Coronary angioplasty status, HTN (hypertension), echocardiogram, Hyperlipidemia, Right bundle branch block, and Ventricular premature depolarization  Pt with active OT orders and up with assistance  orders   Pt lives with spouse in [address] [place] [address] w/ ramped entrance, reports can have a 1st floor setup  Pt was I w/ ADLS and has IADLS, does not drive, & required use of DME PTA including a cane, RW, s/c, and grab bars  Pt is currently demonstrating the following occupational deficits: Min A UB ADLS, Mod A LB ADLS, Min A bed mobility, Mod A transfers and Min A x2 functional mobility w/ use of RW for steadying balance and ongoing repetitive VC for safety, initiating and sequencing task  These deficits that are impacting pt's baseline areas of occupation are a result of the following impairments: pain, endurance, activity tolerance, functional mobility, forward functional reach, balance, trunk control, functional standing tolerance, unsupportive home environment, decreased I w/ ADLS/IADLS, strength, ROM, visual deficits, cognitive impairments, decreased safety awareness and decreased insight into deficits  The following Occupational Performance Areas to address include: eating, grooming, bathing/shower, toilet hygiene, dressing, socialization, health maintenance, functional mobility, clothing management and household maintenance  Pt scored overall 50/100 on the Barthel Index  Based on the aforementioned OT evaluation, functional performance deficits, and assessments, pt has been identified as a high complexity evaluation  Recommend STR upon D/C,when medically stable  Pt to continue to benefit from acute immediate OT services to address the following goals 3-5x/week to  w/in 10-14 days:    Goals   Patient Goals to garden again   LTG Time Frame 10-   Long Term Goal #1 see below listed goals   Plan   Treatment Interventions ADL retraining;Functional transfer training;UE strengthening/ROM; Endurance training;Cognitive reorientation;Patient/family training;Equipment evaluation/education; Compensatory technique education;Continued evaluation; Energy conservation; Activityengagement   Goal Expiration Date 10/07/19   OT Frequency 3-5x/wk Recommendation   OT Discharge Recommendation Short Term Rehab   OT - OK to Discharge Yes  (when medically stable)   Barthel Index   Feeding 10   Bathing 0   Grooming Score 5   Dressing Score 5   Bladder Score 10   Bowels Score 10   Toilet Use Score 5   Transfers (Bed/Chair) Score 5   Mobility (Level Surface) Score 0   Stairs Score 0   Barthel Index Score 50   Modified [place] Scale   Modified Satinder Scale 4     GOALS    1) Pt will improve activity tolerance to G for min 30 min txment sessions for increase engagement in functional tasks  2) Pt will complete UB/LB dressing/self care w/ S using adaptive device and DME as needed  3) Pt will complete bathing w/ S w/ use of AE and DME as needed  4) Pt will complete toileting w/ S w/ G hygiene/thoroughness using DME as needed  5) Pt will improve functional transfers to S on/off all surfaces using DME as needed w/ G balance/safety   6) Pt will improve functional mobility during ADL/IADL/leisure tasks to S using DME as needed w/ G balance/safety   7) Pt will improve dynamic standing balance to S w/ use of AD/DME as needed to increase engagement in sink level ADL/leisure tasks  8) Pt will follow 2 step directions, 75% of the time, w/o cues, to increase safety and awareness during functional activity  9) Pt will demonstrate G carryover of pt/caregiver education and training as appropriate w/o cues w/ good tolerance to increase safety during functional tasks  10) Pt will demonstrate 75% carryover of energy conservation techniques t/o functional I/ADL/leisure tasks w/o cues s/p skilled education to increase endurance during functional tasks     Tyesha Rowland MS, OTR/L

## 2019-09-23 NOTE — PROGRESS NOTES
Progress Note - ICU Transfer to Step down/med  surg  Tiffanie Green 80 y o  male MRN: [id number]    Unit/Bed#: ICU 11 Encounter: [id number]      Code Status: Level 3 - DNAR and DNI    Date of ICU admission: 9/22/19    Reason for ICU adm: Aphasia, SAH    Active problems:   Patient Active Problem List   Diagnosis    Mixed hyperlipidemia    Essential hypertension    Coronary artery disease involving native coronary artery of native heart without angina pectoris    Bilateral leg edema    [facility]-fib [facility] [address] [facility]    SAH (subarachnoid hemorrhage) ([facility] [address] )    Aphasia       Summary of clinical course:   " Carter Rivera is a 80 y o  male who presented to [facility] ED today and stroke alert was activated for aphasia  CT showed L central sulcus SAH and possible R frontal SAH  Pt lives at home with wife, and son lives next door  Pt takes no meds  History of CAD s/p stent 15 yrs ago  Unknown if there was any fall, but this afternoon pt was reportedly found sitting in a chair, seemed confused, not speaking, not following commands  Per family, pt has had gradual decline over the last several months, sometimes having difficulty speaking, often not sleeping at night and wandering the house  Pt unable to provide history at this time  He says he does not know what happened today  Does not know why he is in the hospital  Denies any pain or any complaints  Per son who is MPOA, pt is Mandaen  DNR/DNI " Per Dr Irvin Bodily 9/22/19    Patient was monitored overnight in the ICU  Neurology exam remained stable from admission  Repeat [facility] 9/23/19 with no change in frontal SAH  AAOx 1 awake and alert with no focal deficits  Neurology recommendations for MRI Brain w/o            Neuro:   Subarachnoid hemorrhage  -Personally reports history of falls as well as wife concerned she might have heard a "thud" overnight   Very likely traumatic   -presenting complaint of expressive and receptive aphasia with period of unresponsiveness   -no obvious focal deficits on admission, patient following commands, strength and sensation intact in all extremities  Plan:  Neurology consultation pending- will defer decision on further imaging with MRI  Repeat CT head performed this a m   Without obvious changes awaiting official read  Will discuss case with trauma   Continue q 2 hours neuro checks  SBP goal 140-160  PT/OT  HgA1c, TSH performed  Echo pending  Delirium precautions     Encephalopathy   -periods of unresponsiveness normally occurring in morning which resolve with providing food to patient per family  -patient is AAOx1 to person at this time  -2/2 to [facility] vs underlying dementia vs seizure activity   Plan:  Workup as above   Consider EEG in future                   CV:   Atrial Fibrillation   -initial EKG reading atrial fibrillation, subsequent EKGs performed overnight sinus bradycardia with sinus arrhythmia, PACs, PACs  -no known history of atrial fibrillation  Plan:  Continue telemetry  Hold off on anticoagulation  Follow-up echocardiogram     Hypertension  Plan:  SBP goal 140-160  Labetalol p r n                  Lung:   No acute issues  Chest x-ray with poor effort  Incentive spirometry                 GI:   Regular diet                 FEN:   Fluids-Gentle fluid hydration with FRANCOIS  Electrolytes  -Hypokalemia-replete with K-Dur  -Hypocalcemia-check Ical with AM labs   Nutrition-regular diet                 :      FRANCOIS  -likely FRANCOIS on CKD due to poor oral hydration with component of relative hypotension   -Most recent BMP in chart in 2014 with creatinine of 0 9-1  -creatinine of 2 14 on admission down to 1 85 with gentle fluid hydration  Plan:  Continue gentle fluid hydration until patient can adequately maintain hydration throughout the day  Recheck BMP tomorrow     Urinary Catheter in Place   -Removed                  ID:   No evidence of acute infection  Trend fever WBC curve                 Heme:   Aspirin on medication list when patient presented the hospital but family reports that he has not taking so no reversal on admission   SCDs for DVT prophylaxis                 Endo:   No known issues  Hemoglobin A1c, TSH wnl                            Msk/Skin:   Nystatin for groin area  Frequent turning, repositioning  PT OT    Recent or scheduled procedures:   X-ray Chest 1 View Portable    Result Date: 9/23/2019  Impression: No active pulmonary disease on examination which is somewhat limited secondary to low lung volumes  Workstation performed: [id number]     Ct Head Wo Contrast    Result Date: 9/23/2019  Impression: No significant interval change in the left more than right frontal subarachnoid hemorrhage  Stable generalized atrophy and chronic microangiopathic disease  Workstation performed: [id number]     Ct Stroke Alert Brain    Result Date: 9/22/2019  Impression: Suspect left central sulcus subarachnoid hemorrhage  Possible right frontal subarachnoid hemorrhage Noncontrast brain MRI recommended for further evaluation  Findings were directly discussed with Vianca Carias on 9/22/2019 4:13 PM  Workstation performed: [id number]     Cta Stroke Alert (head/neck)    Result Date: 9/22/2019  Impression: No evidence of hemodynamic significant stenosis, aneurysm or dissection  Debris within the trachea possibly aspirational  Findings were directly discussed with Vianca Carias on 9/22/2019 4:20 PM  Workstation performed: [id number]     Echo     SUMMARY     LEFT VENTRICLE:  Systolic function was normal  Ejection fraction was estimated to be 65 %  There were no regional wall motion abnormalities  Wall thickness was mildly increased  The changes were consistent with concentric remodeling (increased wall thickness with normal wall mass)  Doppler parameters were consistent with abnormal left ventricular relaxation (grade 1 diastolic dysfunction)      MITRAL VALVE:  There was mild annular calcification      AORTIC VALVE:  There was mild regurgitation      Ongoing workup:   Ongoing workup for [facility] with Trauma evaluation and MRI brain w/o contrast, Neurology recommendations  Pending PT/OT eval      Hospital discharge planning:  Pending Trauma, Neurology workup/recommendations, PT/OT Eval  Likely STR vs SNF

## 2019-09-23 NOTE — PROGRESS NOTES
Progress Note - Critical Care   Cliff Daniels 80 y o  male MRN: [id number]  Unit/Bed#: ICU 11 Encounter: [id number]    Assessment:   Patient is 79 YO male with PMHx CAD status post stent 15 years ago, hypertension, hyperlipidemia, Jewish who originally presented to [facility] with chief complaint of expressive and receptive aphasia  Patient's last known normal was 9/21 in the evening the patient had family dinner and was able to speak but had diminished fluency which is baseline  7:00 a m  He was found sitting and not speaking  Low concern at that time but symptoms did not resolve until afternoon so they activated EMS  Evaluated by Neurology with NIHSS 8 with failure to follow commands, answer orientation questions, aphasia, drift in left leg  CT head with left central sulcus SAH, possible right frontal SAH  Plan:          Neuro:   Subarachnoid hemorrhage  -Personally reports history of falls as well as wife concerned she might have heard a "thud" overnight  Very likely traumatic   -presenting complaint of expressive and receptive aphasia with period of unresponsiveness   -no obvious focal deficits on admission, patient following commands, strength and sensation intact in all extremities  Plan:  Neurology consultation pending- will defer decision on further imaging with MRI  Repeat CT head performed this a m   Without obvious changes awaiting official read  Will discuss case with trauma   Continue q 2 hours neuro checks  SBP goal 140-160  PT/OT  HgA1c, TSH performed  Echo pending  Delirium precautions    Encephalopathy   -periods of unresponsiveness normally occurring in morning which resolve with providing food to patient per family  -patient is AAOx1 to person at this time  -[facility] to [facility] vs underlying dementia vs seizure activity   Plan:  Workup as above   Consider EEG in future                   CV:   Arrhythmia  -initial EKG reading atrial fibrillation, subsequent EKGs performed overnight sinus bradycardia with sinus arrhythmia, PACs, PACs  -no known history of atrial fibrillation  Plan:  Continue telemetry  If repeat bouts of atrial fibrillation, hold off on anticoagulation  Follow-up echocardiogram    Hypertension  Plan:  SBP goal 140-160  Labetalol p r n  Lung:   No acute issues  Chest x-ray with poor effort  Incentive spirometry                 GI:   Regular diet                 FEN:   Fluids-Gentle fluid hydration while NPO  Electrolytes  -Hypokalemia-replete with K-Dur  -Hypocalcemia-check Ical   Nutrition-regular diet                 :     FRANCOIS  -likely FRANCOIS on CKD due to poor oral hydration with component of relative hypotension   -Most recent BMP in chart in 2014 with creatinine of 0 9-1  -creatinine of 2 14 on admission down to 1 85 with gentle fluid hydration  Plan:  Continue gentle fluid hydration until patient can adequately maintain hydration throughout the day  Recheck BMP tomorrow    Urinary Catheter in Place   -remove today                 ID:   No evidence of acute infection  Trend fever WBC curve                 Heme:   Aspirin on medication list when patient presented the hospital but family reports that he has not taking so no need for reversal  SCDs for DVT prophylaxis                 Endo:   No known issues  Hemoglobin A1c, TSH wnl                            Msk/Skin:   Nystatin for groin area  Frequent turning, repositioning  PT OT                 Disposition:  Transfer to step-down level 2, transferred to floor    Chief Complaint:  No complaints    HPI/24hr events:   Patient seen examined at bedside  Patient had episode of AFib as well as hypertension on admission  Given 1 dose labetalol pressures were in the 80s  Patient received 500 mL bolus was started on 50 mL/hr fluids  Pressures increased to 100s  Sinus bradycardia in 40s to 50s with PACs and PVCs on telemetry overnight    Patient appeared tired and apathetic to participate during exam overnight per nursing  Physical Exam:   Physical Exam   Constitutional: He appears well-developed and well-nourished  No distress  HENT:   Head: Normocephalic and atraumatic  Mouth/Throat: Oropharynx is clear and moist  No oropharyngeal exudate  Eyes: Pupils are equal, round, and reactive to light  Conjunctivae and EOM are normal  No scleral icterus  Cardiovascular: Normal rate, regular rhythm and normal heart sounds  No murmur heard  Pulmonary/Chest: Effort normal and breath sounds normal  No respiratory distress  He has no wheezes  He has no rales  Abdominal: Soft  Bowel sounds are normal  He exhibits no distension  There is no tenderness  There is no guarding  Musculoskeletal: He exhibits no edema, tenderness or deformity  Neurological: He is alert  Patient awake to verbal stimuli, AAOx1 moving all 4 extremities equally, no cranial nerve deficits, negative Babinski bilaterally   Skin: Skin is warm and dry  Capillary refill takes less than 2 seconds  He is not diaphoretic  Psychiatric: He has a normal mood and affect  His behavior is normal  Judgment and thought content normal          Vitals:    19 0400 19 0500 19 0549 19 0600   BP: 100/68 100/51  102/60   BP Location: Right arm Right arm  Right arm   Pulse: 60 56  58   Resp: 13 19  12   Temp: 98 3 °F (36 8 °C)      TempSrc: Oral      SpO2: 97% 96%  97%   Weight:   73 2 kg (161 lb 6 oz)    Height:                 Temperature:   Temp (24hrs), Av 2 °F (36 8 °C), Min:97 9 °F (36 6 °C), Max:98 4 °F (36 9 °C)    Current: Temperature: 98 3 °F (36 8 °C)    Weights:   IBW: 70 7 kg    Body mass index is 23 83 kg/m²    Weight (last 2 days)     Date/Time   Weight    19 0549   73 2 (161 38)    19 1900   71 (156 53)              Hemodynamic Monitoring:  SVR:       Non-Invasive/Invasive Ventilation Settings:  Respiratory    Lab Data (Last 4 hours)    None         O2/Vent Data (Last 4 hours)    None              No results found for: PHART, JVU5PHJ, PO2ART, YJR7YDY, J4JIBSFA, BEART, SOURCE  SpO2: SpO2: 97 %    Intake and Outputs:  I/O       09/21 0701 - 09/22 0700 09/22 0701 - 09/23 0700    P  O   800    I V  (mL/kg)  951 7 (13)    Total Intake(mL/kg)  1751 7 (23 9)    Urine (mL/kg/hr)  3650    Stool  0    Total Output  3650    Net  -1898 3          Unmeasured Stool Occurrence  2 x        UOP: 2 ml/kg/hour   Nutrition:        Diet Orders   (From admission, onward)             Start     Ordered    09/22/19 1913  Diet Regular; Regular House  Diet effective now     Question Answer Comment   Diet Type Regular    Regular Regular House    RD to adjust diet per protocol? Yes        09/22/19 1918                Labs:   Results from last 7 days   Lab Units 09/22/19  1606   WBC Thousand/uL 8 08   HEMOGLOBIN g/dL 13 8   HEMATOCRIT % 41 0   PLATELETS Thousands/uL 171   NEUTROS PCT % 67   MONOS PCT % 7    Results from last 7 days   Lab Units 09/23/19  0451 09/22/19  1606   SODIUM mmol/L 139 139   POTASSIUM mmol/L 3 6 4 3   CHLORIDE mmol/L 108 105   CO2 mmol/L 25 24   BUN mg/dL 29* 33*   CREATININE mg/dL 1 85* 2 14*   CALCIUM mg/dL 8 0* 8 8     Results from last 7 days   Lab Units 09/23/19  0451   MAGNESIUM mg/dL 2 1          Results from last 7 days   Lab Units 09/22/19  1750   INR  1 14   PTT seconds 32     Results from last 7 days   Lab Units 09/22/19  1606   LACTIC ACID mmol/L 1 3     No results found for: TROPONINI    Imaging:  I have personally reviewed pertinent reports        EKG:  Sinus bradycardia with PACs/PVCs    Micro:  No results found for: Mary Connor, WOUNDCULT, SPUTUMCULTUR    Allergies: No Known Allergies    Medications:   Scheduled Meds:  Current Facility-Administered Medications:  acetaminophen 650 mg Oral Q6H PRN Fanny Payne MD    Labetalol HCl 10 mg Intravenous Q6H PRN Fanny Payne MD    multi-electrolyte 50 mL/hr Intravenous Continuous Fanny Payne MD Last Rate: 50 mL/hr (09/22/19 2122)   nystatin  Topical BID Fanny Payne MD potassium chloride 40 mEq Oral Once Ebenezer Bauman DO      Continuous Infusions:  multi-electrolyte 50 mL/hr Last Rate: 50 mL/hr (09/22/19 2122)     PRN Meds:    acetaminophen 650 mg Q6H PRN   Labetalol HCl 10 mg Q6H PRN       VTE Pharmacologic Prophylaxis: Reason for no pharmacologic prophylaxis S AH  VTE Mechanical Prophylaxis: sequential compression device    Invasive lines and devices: Invasive Devices     Peripheral Intravenous Line            Peripheral IV 09/22/19 Left Forearm less than 1 day    Peripheral IV 09/22/19 Right Forearm less than 1 day          Drain            Urethral Catheter 16 Fr  less than 1 day                   Counseling / Coordination of Care  Total Critical Care time spent 30 minutes excluding procedures, teaching and family updates  Code Status: Level 3 - DNAR and DNI     Portions of the record may have been created with voice recognition software  Occasional wrong word or "sound a like" substitutions may have occurred due to the inherent limitations of voice recognition software  Read the chart carefully and recognize, using context, where substitutions have occurred       Ebenezer Bauman DO

## 2019-09-24 ENCOUNTER — APPOINTMENT (INPATIENT)
Dept: RADIOLOGY | Facility: HOSPITAL | Age: 84
DRG: 082 | End: 2019-09-24
Payer: MEDICARE

## 2019-09-24 PROBLEM — G93.40 ENCEPHALOPATHY: Status: ACTIVE | Noted: 2019-09-24

## 2019-09-24 LAB
ANION GAP SERPL CALCULATED.3IONS-SCNC: 9 MMOL/L (ref 4–13)
BASOPHILS # BLD AUTO: 0.05 THOUSANDS/ΜL (ref 0–0.1)
BASOPHILS NFR BLD AUTO: 1 % (ref 0–1)
BUN SERPL-MCNC: 28 MG/DL (ref 5–25)
CA-I BLD-SCNC: 1.12 MMOL/L (ref 1.12–1.32)
CALCIUM SERPL-MCNC: 9 MG/DL (ref 8.3–10.1)
CHLORIDE SERPL-SCNC: 108 MMOL/L (ref 100–108)
CO2 SERPL-SCNC: 22 MMOL/L (ref 21–32)
CREAT SERPL-MCNC: 2.06 MG/DL (ref 0.6–1.3)
EOSINOPHIL # BLD AUTO: 0.08 THOUSAND/ΜL (ref 0–0.61)
EOSINOPHIL NFR BLD AUTO: 1 % (ref 0–6)
ERYTHROCYTE [DISTWIDTH] IN BLOOD BY AUTOMATED COUNT: 13 % (ref 11.6–15.1)
GFR SERPL CREATININE-BSD FRML MDRD: 27 ML/MIN/1.73SQ M
GLUCOSE SERPL-MCNC: 93 MG/DL (ref 65–140)
HCT VFR BLD AUTO: 39.1 % (ref 36.5–49.3)
HGB BLD-MCNC: 13.3 G/DL (ref 12–17)
IMM GRANULOCYTES # BLD AUTO: 0.05 THOUSAND/UL (ref 0–0.2)
IMM GRANULOCYTES NFR BLD AUTO: 1 % (ref 0–2)
LYMPHOCYTES # BLD AUTO: 1.26 THOUSANDS/ΜL (ref 0.6–4.47)
LYMPHOCYTES NFR BLD AUTO: 15 % (ref 14–44)
MCH RBC QN AUTO: 30.6 PG (ref 26.8–34.3)
MCHC RBC AUTO-ENTMCNC: 34 G/DL (ref 31.4–37.4)
MCV RBC AUTO: 90 FL (ref 82–98)
MONOCYTES # BLD AUTO: 0.49 THOUSAND/ΜL (ref 0.17–1.22)
MONOCYTES NFR BLD AUTO: 6 % (ref 4–12)
NEUTROPHILS # BLD AUTO: 6.48 THOUSANDS/ΜL (ref 1.85–7.62)
NEUTS SEG NFR BLD AUTO: 76 % (ref 43–75)
NRBC BLD AUTO-RTO: 0 /100 WBCS
PLATELET # BLD AUTO: 160 THOUSANDS/UL (ref 149–390)
PMV BLD AUTO: 10.8 FL (ref 8.9–12.7)
POTASSIUM SERPL-SCNC: 4.2 MMOL/L (ref 3.5–5.3)
RBC # BLD AUTO: 4.35 MILLION/UL (ref 3.88–5.62)
SODIUM SERPL-SCNC: 139 MMOL/L (ref 136–145)
WBC # BLD AUTO: 8.41 THOUSAND/UL (ref 4.31–10.16)

## 2019-09-24 PROCEDURE — 85025 COMPLETE CBC W/AUTO DIFF WBC: CPT | Performed by: INTERNAL MEDICINE

## 2019-09-24 PROCEDURE — 97535 SELF CARE MNGMENT TRAINING: CPT

## 2019-09-24 PROCEDURE — 82330 ASSAY OF CALCIUM: CPT | Performed by: INTERNAL MEDICINE

## 2019-09-24 PROCEDURE — 73080 X-RAY EXAM OF ELBOW: CPT

## 2019-09-24 PROCEDURE — NC001 PR NO CHARGE: Performed by: PHYSICIAN ASSISTANT

## 2019-09-24 PROCEDURE — 80048 BASIC METABOLIC PNL TOTAL CA: CPT | Performed by: INTERNAL MEDICINE

## 2019-09-24 PROCEDURE — 99223 1ST HOSP IP/OBS HIGH 75: CPT | Performed by: EMERGENCY MEDICINE

## 2019-09-24 PROCEDURE — 99232 SBSQ HOSP IP/OBS MODERATE 35: CPT | Performed by: GENERAL PRACTICE

## 2019-09-24 PROCEDURE — 72125 CT NECK SPINE W/O DYE: CPT

## 2019-09-24 PROCEDURE — 99221 1ST HOSP IP/OBS SF/LOW 40: CPT | Performed by: INTERNAL MEDICINE

## 2019-09-24 PROCEDURE — 99233 SBSQ HOSP IP/OBS HIGH 50: CPT | Performed by: PSYCHIATRY & NEUROLOGY

## 2019-09-24 RX ORDER — DIVALPROEX SODIUM 250 MG/1
250 TABLET, DELAYED RELEASE ORAL EVERY EVENING
Status: DISCONTINUED | OUTPATIENT
Start: 2019-09-24 | End: 2019-09-25

## 2019-09-24 RX ORDER — LANOLIN ALCOHOL/MO/W.PET/CERES
3 CREAM (GRAM) TOPICAL
Status: DISCONTINUED | OUTPATIENT
Start: 2019-09-24 | End: 2019-10-01

## 2019-09-24 RX ORDER — AMLODIPINE BESYLATE 5 MG/1
5 TABLET ORAL DAILY
Status: DISCONTINUED | OUTPATIENT
Start: 2019-09-24 | End: 2019-10-12 | Stop reason: HOSPADM

## 2019-09-24 RX ORDER — OLANZAPINE 10 MG/1
2.5 INJECTION, POWDER, LYOPHILIZED, FOR SOLUTION INTRAMUSCULAR ONCE AS NEEDED
Status: COMPLETED | OUTPATIENT
Start: 2019-09-24 | End: 2019-09-27

## 2019-09-24 RX ORDER — ACETAMINOPHEN 325 MG/1
975 TABLET ORAL EVERY 8 HOURS SCHEDULED
Status: DISCONTINUED | OUTPATIENT
Start: 2019-09-24 | End: 2019-10-12 | Stop reason: HOSPADM

## 2019-09-24 RX ADMIN — DIVALPROEX SODIUM 250 MG: 250 TABLET, DELAYED RELEASE ORAL at 17:13

## 2019-09-24 RX ADMIN — ACETAMINOPHEN 975 MG: 325 TABLET ORAL at 21:53

## 2019-09-24 RX ADMIN — AMLODIPINE BESYLATE 5 MG: 5 TABLET ORAL at 12:04

## 2019-09-24 RX ADMIN — LABETALOL 20 MG/4 ML (5 MG/ML) INTRAVENOUS SYRINGE 5 MG: at 19:06

## 2019-09-24 RX ADMIN — MELATONIN 3 MG: 3 TAB ORAL at 21:53

## 2019-09-24 NOTE — PROGRESS NOTES
Progress Note Kyle Tom 1924, 80 y o  male MRN: [id number]    Unit/Bed#: [facility] 634-01 Encounter: [id number]    Primary Care Provider: Shadi Witt DO   Date and time admitted to hospital: 2019  6:37 PM        * SAH (subarachnoid hemorrhage) ([address] )  Assessment & Plan  Neuro appreciated  NO AP or AC  Keep SBP < 140    Encephalopathy  Assessment & Plan  Likely hospital delirium in setting of [facility]  Yan consult    Aphasia  Assessment & Plan  2/2 SAH  Monitor    Coronary artery disease involving native coronary artery of native heart without angina pectoris  Assessment & Plan  S/p remote BMS  Pt on no meds at home    Essential hypertension  Assessment & Plan  Start Norvasc  Goal SBP < 140  Labetalol prn    VTE Pharmacologic Prophylaxis:   Pharmacologic: Pharmacologic VTE Prophylaxis contraindicated due to [facility]  Mechanical VTE Prophylaxis in Place: Yes    Patient Centered Rounds: I have performed bedside rounds with nursing staff today  Discussions with Specialists or Other Care Team Provider: neuro and yan    Education and Discussions with Family / Patient: pt and son    Time Spent for Care: 30 minutes  More than 50% of total time spent on counseling and coordination of care as described above  Current Length of Stay: 2 day(s)    Current Patient Status: Inpatient   Certification Statement: The patient will continue to require additional inpatient hospital stay due to need for MRI    Discharge Plan: when off restraints and when MRI completed    Code Status: Level 3 - DNAR and DNI      Subjective:   Pt agitated o/n requiring restraints    Objective:     Vitals:   Temp (24hrs), Av 3 °F (36 8 °C), Min:97 9 °F (36 6 °C), Max:98 7 °F (37 1 °C)    Temp:  [97 9 °F (36 6 °C)-98 7 °F (37 1 °C)] 97 9 °F (36 6 °C)  HR:  [56-90] 68  Resp:  [17-22] 18  BP: (114-173)/(56-99) 144/87  SpO2:  [94 %-98 %] 98 %  Body mass index is 23 83 kg/m²  Input and Output Summary (last 24 hours):        Intake/Output Summary (Last 24 hours) at 9/24/2019 1158  Last data filed at 9/24/2019 0900  Gross per 24 hour   Intake 1458 33 ml   Output 2550 ml   Net -1091 67 ml       Physical Exam:     Physical Exam   Constitutional: No distress  HENT:   Head: Normocephalic and atraumatic  Eyes: Conjunctivae and EOM are normal    Neck: Normal range of motion  Neck supple  Cardiovascular: Normal rate and regular rhythm  Pulmonary/Chest: Effort normal and breath sounds normal  He has no wheezes  He has no rales  Abdominal: Soft  Bowel sounds are normal  He exhibits no distension  There is no tenderness  Musculoskeletal: Normal range of motion  He exhibits no edema  Neurological: He is alert  Ox1   Skin: Skin is warm and dry  He is not diaphoretic  Additional Data:     Labs:    Results from last 7 days   Lab Units 09/24/19  0648   WBC Thousand/uL 8 41   HEMOGLOBIN g/dL 13 3   HEMATOCRIT % 39 1   PLATELETS Thousands/uL 160   NEUTROS PCT % 76*   LYMPHS PCT % 15   MONOS PCT % 6   EOS PCT % 1     Results from last 7 days   Lab Units 09/24/19  0648   POTASSIUM mmol/L 4 2   CHLORIDE mmol/L 108   CO2 mmol/L 22   BUN mg/dL 28*   CREATININE mg/dL 2 06*   CALCIUM mg/dL 9 0     Results from last 7 days   Lab Units 09/22/19  1750   INR  1 14       * I Have Reviewed All Lab Data Listed Above  * Additional Pertinent Lab Tests Reviewed: [address] Admission Reviewed        Recent Cultures (last 7 days):           Last 24 Hours Medication List:     Current Facility-Administered Medications:  acetaminophen 975 mg Oral [facility] Tuesday M MARC Moreno   amLODIPine 5 mg Oral Daily Jack Pacheco DO   Labetalol HCl 5 mg Intravenous Q6H PRN Minh Elizondo PA-C   nystatin  Topical BID Maureen Tirado DO        Today, Patient Was Seen By: Jack Pacheco DO    ** Please Note: Dictation voice to text software may have been used in the creation of this document   **

## 2019-09-24 NOTE — SOCIAL WORK
CM reviewed pt in care coordination rounds  Pt is not medically stable for discharge  Pt is pending a palliative consult  CM waiting discharge plan and medical clearance

## 2019-09-24 NOTE — PROGRESS NOTES
Gerardo ADORNO notified pt agitated, attempting to get out of bed multiple times, unable to redirect  Pt snapped iv tubing from the pump, found with a right wrist skin tear  Control  Team paged  Skin tear irrigated with normal saline,non adherent applied and wrapped with francisco  New orders for Dez soft limbs and posey belt  Will continue to monitor

## 2019-09-24 NOTE — PLAN OF CARE
Problem: Nutrition  Goal: Nutrition/Hydration status is improving  Description  Monitor and assess patient's nutrition/hydration status for malnutrition (ex- brittle hair, bruises, dry skin, pale skin and conjunctiva, muscle wasting, smooth red tongue, and disorientation)  Collaborate with interdisciplinary team and initiate plan and interventions as ordered  Monitor patient's weight and dietary intake as ordered or per policy  Utilize nutrition screening tool and intervene per policy  Determine patient's food preferences and provide high-protein, high-caloric foods as appropriate  - Assist patient with eating   - Allow adequate time for meals   - Encourage patient to take dietary supplement as ordered  - Collaborate with clinical nutritionist   - Include patient/family/caregiver in decisions related to nutrition    Outcome: Progressing

## 2019-09-24 NOTE — PLAN OF CARE
Problem: SAFETY,RESTRAINT: NV/NON-SELF DESTRUCTIVE BEHAVIOR  Goal: Remains free of harm/injury (restraint for non violent/non self-detsructive behavior)  Description  INTERVENTIONS:  - Instruct patient/family regarding restraint use   - Assess and monitor physiologic and psychological status   - Provide interventions and comfort measures to meet assessed patient needs   - Identify and implement measures to help patient regain control  - Assess readiness for release of restraint   Outcome: Progressing  Goal: Returns to optimal restraint-free functioning  Description  INTERVENTIONS:  - Assess the patient's behavior and symptoms that indicate continued need for restraint  - Identify and implement measures to help patient regain control  - Assess readiness for release of restraint   Outcome: Progressing     Problem: Nutrition/Hydration-ADULT  Goal: Nutrient/Hydration intake appropriate for improving, restoring or maintaining nutritional needs  Description  Monitor and assess patient's nutrition/hydration status for malnutrition  Collaborate with interdisciplinary team and initiate plan and interventions as ordered  Monitor patient's weight and dietary intake as ordered or per policy  Utilize nutrition screening tool and intervene as necessary  Determine patient's food preferences and provide high-protein, high-caloric foods as appropriate       INTERVENTIONS:  - Monitor oral intake, urinary output, labs, and treatment plans  - Assess nutrition and hydration status and recommend course of action  - Evaluate amount of meals eaten  - Assist patient with eating if necessary   - Allow adequate time for meals  - Recommend/ encourage appropriate diets, oral nutritional supplements, and vitamin/mineral supplements  - Order, calculate, and assess calorie counts as needed  - Recommend, monitor, and adjust tube feedings and TPN/PPN based on assessed needs  - Assess need for intravenous fluids  - Provide specific nutrition/hydration education as appropriate  - Include patient/family/caregiver in decisions related to nutrition  Outcome: Progressing

## 2019-09-24 NOTE — CONSULTS
Evaluation - Trauma   Anu Gan 80 y o  male MRN: [id number]  Unit/Bed#: [facility] 634-01 Encounter: [id number]    Assessment/Plan   Trauma Alert: Evaluation consult  Model of Arrival: Ambulance  Trauma Team: Attending Hershell Bouquet and Residents Eloise Rosario  Consultants: None    Trauma Active Problems:    -possible unwitnessed fall  -history of dementia, possible acute component of encephalopathy  -left elbow ecchymosis    Trauma Plan:     -agree with current management of subarachnoid hemorrhage - goal -160, q 4 hours neuro checks  Could consider neurosurgery evaluation, however SAH was stable on most recent CT scan, and likely would not require operative intervention  -given history of recent falls, as well as advanced age and baseline poor mental status, would recommend checking CT C-spine without contrast  -check left elbow x-ray due to ecchymosis  -MRI ordered for complete stroke workup  -stat head CT if GCS decreases >2  -urinalysis appears contaminated, not consistent with UTI  Agree with monitoring off antibiotics  -appears to be a component of acute delirium  May benefit from HS sleep aid such as melatonin or antipsychotic such as Seroquel  -consider geriatrics consult  -recommend permanently discontinuing aspirin given propensity for frequent falls  -PT/OT recommending inpatient rehab  -if imaging studies above are unremarkable, trauma will sign off    Chief Complaint:   Subarachnoid hemorrhage  History of Present Illness   Physician Requesting Evaluation: Corinne Churchill MD  Reason for Evaluation / Principal Problem:  Possible fall  HPI:  Anu Gan is a 80 y o  male who presents to the hospital in 9/22 initially as a stroke alert at [facility] for altered mental status and aphasia  He was thought to have drift of the left upper and lower extremity, and for this reason head CT and CTA of the head and neck    This displayed a possible left central sulcus subarachnoid hemorrhage, as well as right frontal subarachnoid hemorrhage  For this reason he was transferred to [facility], and was admitted to the MICU  Upon admission to the MICU, he was found to have some aphasia, but otherwise nonfocal neurologic exam   He was noted to be disoriented, however was thought that this was his baseline mental status  He was monitored in the ICU, and eventually transfer to step-down level 2  Neurology evaluated the patient, and recommended MRI for further workup stroke  He also had a repeat CT scan on 09/23, which was stable  During his hospitalization in the MICU, family revealed that the patient had had several recent falls, and noted that prior to his evaluation at [facility], that his wife might have heard a thud overnight prior to the patient being found with altered mental status  For this reason the trauma team was consulted  Currently, the patient denies pain, however history is slightly limited secondary to baseline mental status with possible overlying delirium  Of note, the patient required security intervention overnight due to agitated behavior  He pulled out his right hand IV, requiring a dressing  Mechanism:Fall          Inpatient consult to Trauma (Inpatient Only)     Date/Time 9/23/2019 5:13 PM     Performed by  Zhane Altamirano MD     Authorized by Lilibeth Torrez DO              Review of Systems   Unable to perform ROS: Dementia       Historical Information     Past Medical History:   Diagnosis Date    Arthritis     Athscl heart disease of native coronary artery w/o ang pctrs     Coronary angioplasty status     HTN (hypertension)     Hx of echocardiogram 04/06/2012    EF 0 65, Normal LV function   Hyperlipidemia     Right bundle branch block     Ventricular premature depolarization      Past Surgical History:   Procedure Laterality Date    CARDIAC CATHETERIZATION  04/04/2012    Single vessel CAD;  Successful bare metal stent placed in the distal RCA     Social History Social History     Substance and Sexual Activity   Alcohol Use No     Social History     Substance and Sexual Activity   Drug Use Never     Social History     Tobacco Use   Smoking Status Never Smoker   Smokeless Tobacco Never Used     Immunization History   Administered Date(s) Administered    INFLUENZA 12/27/2012    Influenza TIV (IM) 12/11/2008, 12/27/2012    TD (adult) Preservative Free 07/07/2011     Last Tetanus:  Unknown  Family History: Non-contributory  Unable to obtain/limited by no limitations      Meds/Allergies   all current active meds have been reviewed    No Known Allergies      Objective   Vitals:   First set: Temperature: 97 9 °F (36 6 °C) (09/22/19 1915)  Pulse: 74 (09/22/19 1900)  Respirations: 19 (09/22/19 1900)  Blood Pressure: 133/76 (09/22/19 1900)    Invasive Devices     Peripheral Intravenous Line            Peripheral IV 09/22/19 Right Forearm 1 day                Neurologic Exam     Cranial Nerves     CN III, IV, VI   Pupils are equal, round, and reactive to light  Extraocular motions are normal      Physical Exam   Constitutional:   Awake, alert  Demented  Confused appearing  Nontoxic  No acute distress   HENT:   Head: Normocephalic and atraumatic  Mouth/Throat: Oropharynx is clear and moist  No oropharyngeal exudate  Eyes: Pupils are equal, round, and reactive to light  EOM are normal  No scleral icterus  Neck: Normal range of motion  No JVD present  No C, T, or L-spine tenderness to palpation  No step-off, deformity, skin change   Cardiovascular: Normal rate, regular rhythm and normal heart sounds  No murmur heard  Pulmonary/Chest: Effort normal  No stridor  No respiratory distress  He has no wheezes  He has no rales  Chest wall nontender   Abdominal: Soft  He exhibits no distension  There is no tenderness  Musculoskeletal: Normal range of motion  He exhibits no edema, tenderness or deformity  Left elbow ecchymosis    There is ecchymosis to the right hand at a prior IV site  No tenderness to palpation of the extremities bilaterally   Neurological: He is alert  No cranial nerve deficit  He exhibits normal muscle tone  Oriented to self, not place, or time  GCS is 14  Strength is 5/5 and equal in all extremities bilaterally  Following commands with all extremities  Skin: Skin is warm and dry  No rash noted  No pallor  PE limited by:  Dementia    Lab Results: Results: I have personally reviewed pertinent reports  Imaging/EKG Studies: Results: I have personally reviewed pertinent reports      Other Studies:  None    Code Status: Level 3 - DNAR and DNI  Advance Directive and Living Will:      Power of :    POLST:      Counseling / Coordination of Care

## 2019-09-24 NOTE — OCCUPATIONAL THERAPY NOTE
Occupational Therapy Treatment Note:       09/24/19 1030   Restrictions/Precautions   Other Precautions Cognitive; Bed Alarm; Chair Alarm; Fall Risk;Restraints  (posey belt)   Pain Assessment   Pain Assessment No/denies pain   Cognition   Overall Cognitive Status Impaired   Comments pt requires mod cues inorder to follow safety precautions  Activity Tolerance   Activity Tolerance Patient tolerated treatment well   Assessment   Assessment pt participated in am ot session and was seen focusing on bed mobility, functional adls ub and lb, functional trnasfers bed to chair and simple command following  pt required mod cues for step by step directions leona as he fatigues  pt requried tactile cues for oob and initaition of tasks  pt was able to verbalize in short sentences  pt was cooperative although initially lethargic and flat  pt was able to wash face and hands and required task to be initaited and he was able to complete  pt required incrased time to motor plan and initiate tasks when fatigued ie stepping during a transfer   Plan   Treatment Interventions ADL retraining;Functional transfer training; Activityengagement;Cognitive reorientation; Endurance training;Patient/family training;Equipment evaluation/education   Goal Expiration Date 10/07/19   OT Treatment Day 1   OT Frequency 3-5x/wk   Recommendation   OT Discharge Recommendation Short Term Rehab   Barthel Index   Feeding 10   Bathing 0   Grooming Score 0   Dressing Score 5   Bladder Score 0   Bowels Score 10   Toilet Use Score 5   Transfers (Bed/Chair) Score 5   Mobility (Level Surface) Score 0   Stairs Score 0   Barthel Index Score 35   Modified [place] Scale   Modified [place] Scale 4   April A DAVID Murry

## 2019-09-24 NOTE — QUICK NOTE
CT Cervical spine negative for injury  He has no midline tenderness in his cervical spine and is not wearing a collar  Trauma will sign off  Please call with concerns

## 2019-09-24 NOTE — CONSULTS
Consultation - Geriatric Medicine   Elias Gibbs 80 y o  male MRN: [id number]  Unit/Bed#: [facility] 634-01 Encounter: [id number]      Assessment/Plan     1  Acute Encephalopathy in the setting of mild Dementia  · Likely secondary to sundowning vs hospital delirium vs miscommunication issues (patient wears hearing aid normally) in the setting of SAH  · Patient is alert and oriented x3 at baseline  On my exam he is alert, oriented to self, place ([facility]) and president, though not date  He is pleasant and answers questions appropriately, however appears to be slow to respond and does display some word searching  Acute encephalopathy appears to have resolved for now, but symptoms are waxing and waning  · Dementia FAST score of 3-4  · Avoid medications which may induce delirium - benzodiazepines, anticholinergics, tramadol/opiates  · Encourage appropriate sleep hygiene  Agree with low dose melatonin added by behavioral health  · Avoid physical restrains  · Chemical restraints as last resort only  Consider zyprexa for acute agitation, monitor QTc closely  QTc 450  · Encourage OOB as tolerated    2  Frailty  · Frailty score of 4   · Encourage proper nutrition  · PT/OT consults appreciated  · Will likely require STR on discharge  3  Ambulatory Dysfunction  · History of frequent falls, though no falls for several months per patient and son  · Uses cane & walker at baseline  · PT/OT consults appreciated  4  Post-Trauma Pain  · Avoid opiates/tramadol  · Patient reports adequate pain control with scheduled tylenol 975 mg q8h scheduled  · Continue to monitor pain level  5  Hearing Impairment  · Recommend use of pocket talker / hearing aid  · Inability to effectively communicate with the patient may have contributed to patient's episode of agitation  6  Subarachnoid Hemorrhage  · Management per primary team & neurology  7  Aphasia  · Aphasia appears to have resolved for now    · Management per primary team & neurology  History of Present Illness   Physician Requesting Consult: David Michelle DO  Reason for Consult / Principal Problem: Encephalopathy  Hx and PE limited by: Encephalopathy with likely underlying early dementia  Additional history obtained from: Chart Review      HPI: Anu Gan is a 80y o  year old male who presented with aphasia to [facility] ED on 9/22  Stroke alert was activated and CT was significant for left central sulcus SAH and possible right frontal SAH  He was [facility]  Per admitting H&P, patient lives at home with his wife and has a son that lives next door  Patient was found sitting in a chair, seeming confused, unable to speak or follow commands  It is unknown whether the patient had a fall, however his wife states she may have heard a "thud" overnight  Per family he has been declining gradually over several months, sometimes having difficulty speaking, and suffering from insomnia  At time of admission, patient was unable to say what happened prior to his arrival and did not know why he was in the hospital      Per further chart review, patient's son reports episodes of "random non-responsiveness with blank stares" beginning 2-3 weeks ago  He has also been word searching for several months  He has been managed conservatively by the critical care, hospitalist, and trauma services as his [facility] appears stable on CT imaging  He is being transferred for EEG imaging  Last evening, the patient had an episode of acute agitation during which medical staff was unable to redirect him  The control team had to be called and bilateral soft limb restraints and [facility] were ordered  Due to this episode, the geriatric service was consulted  At time of my exam:    Patient is oriented to self, location (specifically stating [facility]), and president  Not to date  He has a pleasant demeanor and is happy to answer my questions   He answers all of my questions appropriately, though is at times slow to respond and appears to be searching for words  The following history is provided by the patient  Son and wife are not present to provide additional history  Memory/Cognitive screening:  A&O x3 at baseline  Currently A&O to self, location, and president  Not oriented to date  Does not recall events that led him here  Dementia FAST score 3-4  Per son, has been having increased word finding difficulties for several months  Mobility:  Ambulates with cane at baseline  Falls:  History of frequent falls, however per son - no falls for several months  Assistive Devices:  Walker & cane  Fraility: Clinical frailty score 4  Nutrition/weight loss/grocery shopping/meal preparation: Prepares own meals at home, goes grocery shopping with son, has a healthy appetite, denies recent weight loss  Vision impairment: Wears glasses, has regular eye exams  Hearing impairment: Hard of hearing, normally wears device in left ear  Incontinence: No incontinence of urine or feces  Delirium: None  Polypharmacy: Negative  Patient takes no medications  Patients primary residence: Home Lives with:Wife  Son lives next door  iADL's:  Telephone: Yes, Shopping: With Son, Food: Can cook for himself, Housekeeping: Yes, Laundry: Yes, Transport: No - Son Drives, Finances: No - Son manages finances  ADL's:  Hygiene: Yes, Dressing: Yes, Feeding: Yes, Transferring: Yes, Toileting: Yes, Ambulation: With Cane      PMH significant for arthritis, htn, hld, PTCA with stent  His medication list states he takes aspirin, but per chart review, his son reports that he has not taken this medication for at least months  He also states he takes no medications  Son Ed is medical POA  Patient is a Religious             Inpatient consult to Gerontology     Date/Time 9/24/2019 3:20 PM     Performed by  rAmaan Ken DO     Authorized by Samson Russo DO              Review of Systems   Constitutional: Positive for fatigue  Negative for chills and fever  Respiratory: Negative for chest tightness and shortness of breath  Cardiovascular: Negative for chest pain  Gastrointestinal: Negative  Musculoskeletal: Negative  Skin: Negative  Neurological: Positive for weakness  Negative for dizziness, speech difficulty, light-headedness, numbness and headaches  Historical Information   Past Medical History:   Diagnosis Date    Arthritis     Athscl heart disease of native coronary artery w/o ang pctrs     Coronary angioplasty status     HTN (hypertension)     Hx of echocardiogram 04/06/2012    EF 0 65, Normal LV function   Hyperlipidemia     Right bundle branch block     Ventricular premature depolarization      Past Surgical History:   Procedure Laterality Date    CARDIAC CATHETERIZATION  04/04/2012    Single vessel CAD; Successful bare metal stent placed in the distal RCA     Social History   Social History     Substance and Sexual Activity   Alcohol Use No     Social History     Substance and Sexual Activity   Drug Use Never     Social History     Tobacco Use   Smoking Status Never Smoker   Smokeless Tobacco Never Used     Family History:   Family History   Problem Relation Age of Onset    No Known Problems Family         noncontributory     Patient denies any family history of dementia, stroke, cancer, or other relevant conditions  Son and wife are not available for confirmation  Meds/Allergies   PTA meds:  ASA on med list, however patient denies taking any medications  No Known Allergies    Objective     Intake/Output Summary (Last 24 hours) at 9/24/2019 1531  Last data filed at 9/24/2019 1300  Gross per 24 hour   Intake 858 33 ml   Output 2550 ml   Net -1691 67 ml     Invasive Devices     Peripheral Intravenous Line            Peripheral IV 09/22/19 Right Forearm 1 day                Physical Exam   HENT:   Head: Normocephalic and atraumatic  Cardiovascular: Normal rate and regular rhythm  Pulmonary/Chest: Effort normal and breath sounds normal    Abdominal: Soft  Bowel sounds are normal  There is no tenderness  Neurological: He is alert  Oriented to self, place, president  Not to exact date  Skin: Skin is warm and dry  Lab Results:   I have personally reviewed pertinent lab results  I have personally reviewed the following imaging study reports:  Serial CT scans from admission to today  Personal interpretation of imaging studies mentioned above:    - Stable CT findings of SAH  Therapies:   PT: Recommended  OT: Recommended    VTE Prophylaxis: Sequential compression device Acquanetta Philippe)     Code Status: Level 3 - DNAR and DNI  Advance Directive and Living Will:      Power of :   Adult son Saurabh Arias ([phone])    Family and Social Support: Lives with wife  Adult son lives next door  Living Arrangements: Spouse/significant other  Support Systems: Spouse/significant other;Children  Assistance Needed: food delivery, ambulation  Type of Current Residence: Private residence  [address]: Yes  Type of Current Home Care Services: Meals on Wheels  [place] of Choice: Yes        Keegan Mauro DO  09/24/19  3:31 PM    Some portions of this record may have been generated with voice recognition software  There may be translation, syntax, or grammatical errors  Occasional wrong word or "sound-a-like" substitutions may have occurred due to the inherent limitations of the voice recognition software  Read the chart carefully and recognize, using context, where substations may have occurred   If you have any questions, please contact the dictating provider for clarification or correction, as needed

## 2019-09-24 NOTE — PHYSICAL THERAPY NOTE
Physical Therapy Cancellation Note        Pt being txf to P7  For EEG monitoring    Will cont to follow      Jeanmarie Reasons, PTA

## 2019-09-24 NOTE — PROGRESS NOTES
Progress Note - Neurology   Lulu Moreau 80 y o  male MRN: [id number]  Unit/Bed#: [facility] 634-01 Encounter: [id number]    Assessment:  Traumatic Subarachnoid Hemorrhage    Plan:  Video EEG, due to perseverating and waxing & waning  Start Depakote 250mg daily @ 5pm and Melatonin at night   Follow up on pending Brain-MRI  Con't to hold anticoagulation/antiplatelets, No NSAIDs  PT, focusing on imbalance to help prevent falls  Continue supportive care and medical management per primary team, contact if changes  Subjective: The pt is awake and alert, comfortably sitting on the bedside chair  He is oriented to person, but not place and time  He is perseverating and has continued expressive aphasia  Nursing staff reports that the patient was very agitated and confused overnight and early this morning  He was temporarily placed on restraints overnight due to his constant attempts to remove his lines and monitors  His agitation improved throughout the afternoon, with a similar waxing and waning pattern as yesterday  The patient's son states at baseline, he has been experiencing disorientation to time, but always knows his age (as compared to now)  He states the patient also has had recent word-searching and forgetfulness in the past couple of months  ROS:  Neurological ROS:   negative, migraine headaches, syncope, seizures, paralysis, numbness or tingling of hands, numbness or tingling of feet, involuntary movements, tremor, generalized seizures, focal seizures    Vitals: Blood pressure 144/87, pulse 68, temperature 97 9 °F (36 6 °C), resp  rate 18, height 5' 9" (1 753 m), weight 73 2 kg (161 lb 6 oz), SpO2 98 %  ,Body mass index is 23 83 kg/m²      Physical Exam: /87   Pulse 68   Temp 97 9 °F (36 6 °C)   Resp 18   Ht 5' 9" (1 753 m)   Wt 73 2 kg (161 lb 6 oz)   SpO2 98%   BMI 23 83 kg/m²   General appearance: alert and delirious  Head: Normocephalic, without obvious abnormality, atraumatic  Eyes: conjunctivae/corneas clear  PERRL, EOM's intact  Ears: decreased hearing  Lungs: clear to auscultation bilaterally  Heart: regular rate and rhythm, S1, S2 normal, no murmur, click, rub or gallop  Abdomen: soft, non-tender; bowel sounds normal; no masses,  no organomegaly  Extremities: extremities normal, warm and well-perfused; no cyanosis, clubbing, or edema  Pulses: 2+ and symmetric  Skin: Skin color, texture, turgor normal   Neurologic: Mental status: alertness: alert, orientation: to person, not place and time  Cranial nerves: normal  Sensory: normal  Motor: grossly normal  Reflexes: 2+ and symmetric  Coordination: normal    Lab, Imaging and other studies:   I have personally reviewed pertinent reports    , CBC:   Results from last 7 days   Lab Units 09/24/19  0648 09/22/19  1606   WBC Thousand/uL 8 41 8 08   RBC Million/uL 4 35 4 42   HEMOGLOBIN g/dL 13 3 13 8   HEMATOCRIT % 39 1 41 0   MCV fL 90 93   PLATELETS Thousands/uL 160 171   , BMP/CMP:   Results from last 7 days   Lab Units 09/24/19  0648 09/23/19  0451 09/22/19  1606   SODIUM mmol/L 139 139 139   POTASSIUM mmol/L 4 2 3 6 4 3   CHLORIDE mmol/L 108 108 105   CO2 mmol/L 22 25 24   BUN mg/dL 28* 29* 33*   CREATININE mg/dL 2 06* 1 85* 2 14*   CALCIUM mg/dL 9 0 8 0* 8 8   EGFR ml/min/1 73sq m 27 30 25   , Vitamin B12:   , HgBA1C:   Results from last 7 days   Lab Units 09/23/19  0451   HEMOGLOBIN A1C % 5 8   , TSH:   Results from last 7 days   Lab Units 09/23/19  0451   TSH 3RD GENERATON uIU/mL 2 020   , Coagulation:   Results from last 7 days   Lab Units 09/22/19  1750   INR  1 14   , Lipid Profile:   Results from last 7 days   Lab Units 09/23/19  0451   HDL mg/dL 46   LDL CALC mg/dL 40   TRIGLYCERIDES mg/dL 80   , Ammonia:   Results from last 7 days   Lab Units 09/22/19  1606   AMMONIA umol/L <10*   , Urinalysis:   Results from last 7 days   Lab Units 09/22/19  1751   COLOR UA  Yellow   CLARITY UA  Slightly Cloudy   SPEC GRAV UA  1 010   PH UA  6 0 LEUKOCYTES UA  Negative   NITRITE UA  Negative   GLUCOSE UA mg/dl Negative   KETONES UA mg/dl Negative   BILIRUBIN UA  Negative   BLOOD UA  Moderate*   , Drug Screen:   , Medication Drug Levels:       Invalid input(s): CARBAMAZEPINE,  PHENOBARB, LACOSAMIDE, OXCARBAZEPINE  VTE Prophylaxis: Sequential compression device (Venodyne)     Counseling / Coordination of Care  Total time spent today 45 minutes  Greater than 50% of total time was spent with the patient and / or family counseling and / or coordination of care   A description of the counseling / coordination of care: discussed with patient

## 2019-09-24 NOTE — QUICK NOTE
For neurology consult note/recommendations please see consult done by Renea Durán MD- resident on neurology, cosigned by myself- attending physician 9/23/19

## 2019-09-25 ENCOUNTER — APPOINTMENT (INPATIENT)
Dept: NEUROLOGY | Facility: CLINIC | Age: 84
DRG: 082 | End: 2019-09-25
Payer: MEDICARE

## 2019-09-25 PROBLEM — R60.0 BILATERAL LEG EDEMA: Status: RESOLVED | Noted: 2019-07-29 | Resolved: 2019-09-25

## 2019-09-25 LAB
ANION GAP SERPL CALCULATED.3IONS-SCNC: 5 MMOL/L (ref 4–13)
BUN SERPL-MCNC: 29 MG/DL (ref 5–25)
CALCIUM SERPL-MCNC: 8.6 MG/DL (ref 8.3–10.1)
CHLORIDE SERPL-SCNC: 108 MMOL/L (ref 100–108)
CO2 SERPL-SCNC: 24 MMOL/L (ref 21–32)
CREAT SERPL-MCNC: 1.92 MG/DL (ref 0.6–1.3)
GFR SERPL CREATININE-BSD FRML MDRD: 29 ML/MIN/1.73SQ M
GLUCOSE SERPL-MCNC: 95 MG/DL (ref 65–140)
POTASSIUM SERPL-SCNC: 3.6 MMOL/L (ref 3.5–5.3)
SODIUM SERPL-SCNC: 137 MMOL/L (ref 136–145)

## 2019-09-25 PROCEDURE — 95951 HB EEG MONITORING/VIDEORECORD: CPT

## 2019-09-25 PROCEDURE — 99232 SBSQ HOSP IP/OBS MODERATE 35: CPT | Performed by: INTERNAL MEDICINE

## 2019-09-25 PROCEDURE — 80048 BASIC METABOLIC PNL TOTAL CA: CPT | Performed by: GENERAL PRACTICE

## 2019-09-25 PROCEDURE — 99233 SBSQ HOSP IP/OBS HIGH 50: CPT | Performed by: INTERNAL MEDICINE

## 2019-09-25 PROCEDURE — 99233 SBSQ HOSP IP/OBS HIGH 50: CPT | Performed by: PSYCHIATRY & NEUROLOGY

## 2019-09-25 RX ORDER — LABETALOL 20 MG/4 ML (5 MG/ML) INTRAVENOUS SYRINGE
5 EVERY 4 HOURS
Status: DISCONTINUED | OUTPATIENT
Start: 2019-09-25 | End: 2019-09-25

## 2019-09-25 RX ORDER — LABETALOL 20 MG/4 ML (5 MG/ML) INTRAVENOUS SYRINGE
5 EVERY 4 HOURS PRN
Status: DISCONTINUED | OUTPATIENT
Start: 2019-09-25 | End: 2019-10-12 | Stop reason: HOSPADM

## 2019-09-25 RX ORDER — METOPROLOL TARTRATE 5 MG/5ML
2.5 INJECTION INTRAVENOUS ONCE
Status: DISCONTINUED | OUTPATIENT
Start: 2019-09-25 | End: 2019-09-25

## 2019-09-25 RX ORDER — DIVALPROEX SODIUM 500 MG/1
500 TABLET, DELAYED RELEASE ORAL EVERY EVENING
Status: DISCONTINUED | OUTPATIENT
Start: 2019-09-25 | End: 2019-10-01

## 2019-09-25 RX ADMIN — NYSTATIN: 100000 POWDER TOPICAL at 10:03

## 2019-09-25 RX ADMIN — NYSTATIN: 100000 POWDER TOPICAL at 17:43

## 2019-09-25 RX ADMIN — MELATONIN 3 MG: 3 TAB ORAL at 21:46

## 2019-09-25 RX ADMIN — LABETALOL 20 MG/4 ML (5 MG/ML) INTRAVENOUS SYRINGE 5 MG: at 00:28

## 2019-09-25 RX ADMIN — ACETAMINOPHEN 975 MG: 325 TABLET ORAL at 14:26

## 2019-09-25 RX ADMIN — ACETAMINOPHEN 975 MG: 325 TABLET ORAL at 21:46

## 2019-09-25 RX ADMIN — AMLODIPINE BESYLATE 5 MG: 5 TABLET ORAL at 09:59

## 2019-09-25 RX ADMIN — LABETALOL 20 MG/4 ML (5 MG/ML) INTRAVENOUS SYRINGE 5 MG: at 04:28

## 2019-09-25 RX ADMIN — DIVALPROEX SODIUM 500 MG: 500 TABLET, DELAYED RELEASE ORAL at 17:57

## 2019-09-25 RX ADMIN — LABETALOL 20 MG/4 ML (5 MG/ML) INTRAVENOUS SYRINGE 5 MG: at 09:59

## 2019-09-25 NOTE — PROGRESS NOTES
Progress Note - Neurology   Cathaleen Duane 80 y o  male MRN: [id number]  Unit/Bed#: [facility] 717-01 Encounter: [id number]    Assessment:  Traumatic Subarachnoid Hemorrhage  Delirium, likely from prolonged hospitalization    Plan:  Continue vEEG; monitor for focal seizures  Increase Depakote to 500 mg qd @ 5pm  Continue Melatonin at bedtime  Follow up with pending speech cognitive therapy eval & treatment  Follow up with pending MRI  Continue to hold anticoagulation/antiplatelets, No NSAIDs  Continue PT, focusing on imbalance to help prevent falls  Continue supportive care and medical management per primary team, contact if changes        Subjective:   Overnight, nursing staff reports that the patient was very agitated, confused and disoriented  He was again pulling out his IV's, which led to him being temporarily placed on bilateral upper extremity soft restraints  This morning the patient is calm and cooperative  He is oriented to person, but not place  He knows who the president is and the month, but does not know the year  His speech is clearer today, with improvement in his expressive aphasia  He has decreased memory and increased confusion, but is able to name 3 objects and read the menu at bedside  The patient denies any headaches, seizure like activity or further complaints at this time  ROS:  General ROS:   Negative migraine headaches, syncope, seizures, paralysis, numbness or tingling of hands, numbness or tingling of feet, involuntary movements, tremor or fainting  Vitals: Blood pressure 108/58, pulse 62, temperature 98 1 °F (36 7 °C), resp  rate 18, height 5' 9" (1 753 m), weight 72 3 kg (159 lb 6 3 oz), SpO2 98 %  ,Body mass index is 23 54 kg/m²      Physical Exam: /58   Pulse 62   Temp 98 1 °F (36 7 °C)   Resp 18   Ht 5' 9" (1 753 m)   Wt 72 3 kg (159 lb 6 3 oz)   SpO2 98%   BMI 23 54 kg/m²     General Appearance:    Alert, cooperative, no distress, appears stated age   Head: Normocephalic, some noted scratches on head  Eyes:    PERRL, conjunctiva/corneas clear, EOM's intact        Ears:    Decreased hearing   Neck:   Supple, symmetrical, trachea midline, no adenopathy;        thyroid:  No enlargement/tenderness/nodules; no carotid    bruit or JVD   Back:     ROM normal, no CVA tenderness   Lungs:     Clear to auscultation bilaterally, respirations unlabored   Chest wall:    No tenderness or deformity   Heart:    Regular rate and rhythm, S1 and S2 normal, no murmur, rub   or gallop   Abdomen:     Soft, non-tender, bowel sounds active all four quadrants,     no masses, no organomegaly   Extremities:   Extremities normal, atraumatic, no cyanosis or edema   Pulses:   2+ and symmetric all extremities   Skin:   Skin color, texture, turgor normal, no rashes or lesions   Lymph nodes:   Cervical, supraclavicular, and axillary nodes normal   Neurologic: Mental status: alert, orientation to person but not place  Knows the month and president, but not his age and year  Cranial nerves: intact, except for CN VIII with decreased hearing  Sensory: normal  Motor: grossly normal  Reflexes: 2+ and symmetric throughout, except for 1+ in LLE  Speech: expressive aphasia (improving)       Lab, Imaging and other studies:   I have personally reviewed pertinent reports    , CBC:   Results from last 7 days   Lab Units 09/24/19  0648 09/22/19  1606   WBC Thousand/uL 8 41 8 08   RBC Million/uL 4 35 4 42   HEMOGLOBIN g/dL 13 3 13 8   HEMATOCRIT % 39 1 41 0   MCV fL 90 93   PLATELETS Thousands/uL 160 171   , BMP/CMP:   Results from last 7 days   Lab Units 09/25/19  0618 09/24/19  0648 09/23/19  0451   SODIUM mmol/L 137 139 139   POTASSIUM mmol/L 3 6 4 2 3 6   CHLORIDE mmol/L 108 108 108   CO2 mmol/L 24 22 25   BUN mg/dL 29* 28* 29*   CREATININE mg/dL 1 92* 2 06* 1 85*   CALCIUM mg/dL 8 6 9 0 8 0*   EGFR ml/min/1 73sq m 29 27 30   , Vitamin B12:   , HgBA1C:   Results from last 7 days   Lab Units 09/23/19  0451 HEMOGLOBIN A1C % 5 8   , TSH:   Results from last 7 days   Lab Units 09/23/19  0451   TSH 3RD GENERATON uIU/mL 2 020   , Coagulation:   Results from last 7 days   Lab Units 09/22/19  1750   INR  1 14   , Lipid Profile:   Results from last 7 days   Lab Units 09/23/19  0451   HDL mg/dL 46   LDL CALC mg/dL 40   TRIGLYCERIDES mg/dL 80   , Ammonia:   Results from last 7 days   Lab Units 09/22/19  1606   AMMONIA umol/L <10*   , Urinalysis:   Results from last 7 days   Lab Units 09/22/19  1751   COLOR UA  Yellow   CLARITY UA  Slightly Cloudy   SPEC GRAV UA  1 010   PH UA  6 0   LEUKOCYTES UA  Negative   NITRITE UA  Negative   GLUCOSE UA mg/dl Negative   KETONES UA mg/dl Negative   BILIRUBIN UA  Negative   BLOOD UA  Moderate*   , Drug Screen:   , Medication Drug Levels:       Invalid input(s): CARBAMAZEPINE,  PHENOBARB, LACOSAMIDE, OXCARBAZEPINE  VTE Prophylaxis: Sequential compression device (Venodyne)     Counseling / Coordination of Care  Total time spent today 45 minutes  Greater than 50% of total time was spent with the patient and / or family counseling and / or coordination of care   A description of the counseling / coordination of care: discussed with patient and son

## 2019-09-25 NOTE — ASSESSMENT & PLAN NOTE
Likely to [facility]  Continue to work with speech therapy  Will need discharge to rehab setting

## 2019-09-25 NOTE — PROGRESS NOTES
Progress Note - Geriatric Medicine   Elias Gibbs 80 y o  male MRN: [id number]  Unit[facility] Encounter: [id number]      Assessment/Plan:    Subarachnoid hemorrhage  -Neurology following  -CT head 09/22/2019 revealed subarachnoid hemorrhage  -repeat CT head 09/23/2019 revealed no significant interval change  -patient remains off anticoagulation and platelets at this time  -continue BP control with goal SBP less than 579    Acute metabolic encephalopathy  -likely secondary to subarachnoid hemorrhage as well as underlying cognitive impairment/dementia  -currently undergoing EEG study   -continue to address incorrect any underlying metabolic derangements  -maintain normal sleep-wake cycle  -treat underlying pain  -monitor for fecal and urinary retention    Deconditioning/debility/frailty  -continue fall precautions  -continue nutritional support  -PT/OT  -would likely benefit from short-term rehab at discharge    Ambulatory dysfunction  -continue fall precautions  -patient impulsive overnight requiring Posey belt restraints due to attempts to get out of bed and high fall risk  -less impulsive today, appreciate maintaining patient's room close to nursing station for close monitoring    Impaired hearing  -recommend use of hearing amplifier    Subjective:     Patient seen examined at the bedside where he is lying resting comfortably  He was transferred up to the 7th floor overnight to be monitored on EEG which is ongoing  Since administration of Depakote later in the day yesterday his mood has been significantly improved  Review of Systems   Unable to perform ROS: Mental status change   -patient denies pain but unable to participate in ROS further    Objective:     Vitals: Blood pressure 145/78, pulse 64, temperature 98 1 °F (36 7 °C), resp  rate 16, height 5' 9" (1 753 m), weight 72 3 kg (159 lb 6 3 oz), SpO2 97 %  ,Body mass index is 23 54 kg/m²        Intake/Output Summary (Last 24 hours) at 9/25/2019 1008  Last data filed at 9/25/2019 0454  Gross per 24 hour   Intake 180 ml   Output 2350 ml   Net -2170 ml       Current Medications: Reviewed    Physical Exam:   Physical Exam   Constitutional: He appears well-developed and well-nourished  No distress  At time of exam patient is resting in bed comfortably, he is on continued EEG, he remains in Posey belt and 1 soft limb restraints due to impulsiveness   HENT:   Head: Normocephalic  Mucous membranes are tachy   Eyes: Conjunctivae are normal  No scleral icterus  Neck: Normal range of motion  No tracheal deviation present  Cardiovascular: Normal rate and normal heart sounds  Pulmonary/Chest: Effort normal  No respiratory distress  Abdominal: Soft  There is no tenderness  Musculoskeletal:   No abnormal muscle tone, muscle bulk and tone consistent with age and activity level   Neurological:   Patient is awake and alert delirium/encephalopathy appears to be improving   -he is less agitated and easily redirected   Skin: Skin is warm and dry  Psychiatric:   Affect flat, appears pleasantly confused, speech delay   Nursing note and vitals reviewed       Invasive Devices     Peripheral Intravenous Line            Peripheral IV 09/22/19 Right Forearm 2 days          Drain            Urethral Catheter Straight-tip 16 Fr  less than 1 day              Lab, Imaging and other studies:   -sodium 137, potassium 3 6, chloride 108, BUN 29, creatinine 1 92, GFR 29  -

## 2019-09-25 NOTE — PLAN OF CARE
Problem: Prexisting or High Potential for Compromised Skin Integrity  Goal: Skin integrity is maintained or improved  Description  INTERVENTIONS:  - Identify patients at risk for skin breakdown  - Assess and monitor skin integrity  - Assess and monitor nutrition and hydration status  - Monitor labs   - Assess for incontinence   - Turn and reposition patient  - Assist with mobility/ambulation  - Relieve pressure over bony prominences  - Avoid friction and shearing  - Provide appropriate hygiene as needed including keeping skin clean and dry  - Evaluate need for skin moisturizer/barrier cream  - Collaborate with interdisciplinary team   - Patient/family teaching  - Consider wound care consult   Outcome: Progressing     Problem: Neurological Deficit  Goal: Neurological status is stable or improving  Description  Interventions:  - Monitor and assess patient's level of consciousness, motor function, sensory function, and level of assistance needed for ADLs  - Monitor and report changes from baseline  Collaborate with interdisciplinary team to initiate plan and implement interventions as ordered  - Provide and maintain a safe environment  - Consider seizure precautions  - Consider fall precautions  - Consider aspiration precautions  - Consider bleeding precautions  Outcome: Progressing     Problem: Activity Intolerance/Impaired Mobility  Goal: Mobility/activity is maintained at optimum level for patient  Description  Interventions:  - Assess and monitor patient  barriers to mobility and need for assistive/adaptive devices  - Assess patient's emotional response to limitations  - Collaborate with interdisciplinary team and initiate plans and interventions as ordered  - Encourage independent activity per ability   - Maintain proper body alignment  - Perform active/passive rom as tolerated/ordered    - Plan activities to conserve energy   - Turn patient as appropriate  Outcome: Progressing     Problem: Communication Impairment  Goal: Ability to express needs and understand communication  Description  Assess patient's communication skills and ability to understand information  Patient will demonstrate use of effective communication techniques, alternative methods of communication and understanding even if not able to speak  - Encourage communication and provide alternate methods of communication as needed  - Collaborate with case management/ for discharge needs  - Include patient/family/caregiver in decisions related to communication  Outcome: Progressing     Problem: Potential for Aspiration  Goal: Non-ventilated patient's risk of aspiration is minimized  Description  Assess and monitor vital signs, respiratory status, and labs (WBC)  Monitor for signs of aspiration (tachypnea, cough, rales, wheezing, cyanosis, fever)  - Assess and monitor patient's ability to swallow  - Place patient up in chair to eat if possible  - HOB up at 90 degrees to eat if unable to get patient up into chair   - Supervise patient during oral intake  - Instruct patient/ family to take small bites  - Instruct patient/ family to take small single sips when taking liquids  - Follow patient-specific strategies generated by speech pathologist   Outcome: Progressing     Problem: Nutrition  Goal: Nutrition/Hydration status is improving  Description  Monitor and assess patient's nutrition/hydration status for malnutrition (ex- brittle hair, bruises, dry skin, pale skin and conjunctiva, muscle wasting, smooth red tongue, and disorientation)  Collaborate with interdisciplinary team and initiate plan and interventions as ordered  Monitor patient's weight and dietary intake as ordered or per policy  Utilize nutrition screening tool and intervene per policy  Determine patient's food preferences and provide high-protein, high-caloric foods as appropriate       - Assist patient with eating   - Allow adequate time for meals   - Encourage patient to take dietary supplement as ordered  - Collaborate with clinical nutritionist   - Include patient/family/caregiver in decisions related to nutrition  Outcome: Progressing     Problem: NEUROSENSORY - ADULT  Goal: Achieves stable or improved neurological status  Description  INTERVENTIONS  - Monitor and report changes in neurological status  - Monitor vital signs such as temperature, blood pressure, glucose, and any other labs ordered   - Initiate measures to prevent increased intracranial pressure  - Monitor for seizure activity and implement precautions if appropriate      Outcome: Progressing  Goal: Achieves maximal functionality and self care  Description  INTERVENTIONS  - Monitor swallowing and airway patency with patient fatigue and changes in neurological status  - Encourage and assist patient to increase activity and self care     - Encourage visually impaired, hearing impaired and aphasic patients to use assistive/communication devices  Outcome: Progressing     Problem: CARDIOVASCULAR - ADULT  Goal: Maintains optimal cardiac output and hemodynamic stability  Description  INTERVENTIONS:  - Monitor I/O, vital signs and rhythm  - Monitor for S/S and trends of decreased cardiac output  - Administer and titrate ordered vasoactive medications to optimize hemodynamic stability  - Assess quality of pulses, skin color and temperature  - Assess for signs of decreased coronary artery perfusion  - Instruct patient to report change in severity of symptoms  Outcome: Progressing  Goal: Absence of cardiac dysrhythmias or at baseline rhythm  Description  INTERVENTIONS:  - Continuous cardiac monitoring, vital signs, obtain 12 lead EKG if ordered  - Administer antiarrhythmic and heart rate control medications as ordered  - Monitor electrolytes and administer replacement therapy as ordered  Outcome: Progressing     Problem: GASTROINTESTINAL - ADULT  Goal: Maintains or returns to baseline bowel function  Description  INTERVENTIONS:  - Assess bowel function  - Encourage oral fluids to ensure adequate hydration  - Administer IV fluids if ordered to ensure adequate hydration  - Administer ordered medications as needed  - Encourage mobilization and activity  - Consider nutritional services referral to assist patient with adequate nutrition and appropriate food choices  Outcome: Progressing  Goal: Maintains adequate nutritional intake  Description  INTERVENTIONS:  - Monitor percentage of each meal consumed  - Identify factors contributing to decreased intake, treat as appropriate  - Assist with meals as needed  - Monitor I&O, weight, and lab values if indicated  - Obtain nutrition services referral as needed  Outcome: Progressing     Problem: GENITOURINARY - ADULT  Goal: Urinary catheter remains patent  Description  INTERVENTIONS:  - Assess patency of urinary catheter  - If patient has a chronic barrera, consider changing catheter if non-functioning  - Follow guidelines for intermittent irrigation of non-functioning urinary catheter  Outcome: Progressing     Problem: METABOLIC, FLUID AND ELECTROLYTES - ADULT  Goal: Electrolytes maintained within normal limits  Description  INTERVENTIONS:  - Monitor labs and assess patient for signs and symptoms of electrolyte imbalances  - Administer electrolyte replacement as ordered  - Monitor response to electrolyte replacements, including repeat lab results as appropriate  - Instruct patient on fluid and nutrition as appropriate  Outcome: Progressing  Goal: Fluid balance maintained  Description  INTERVENTIONS:  - Monitor labs   - Monitor I/O and WT  - Instruct patient on fluid and nutrition as appropriate  - Assess for signs & symptoms of volume excess or deficit  Outcome: Progressing     Problem: SKIN/TISSUE INTEGRITY - ADULT  Goal: Skin integrity remains intact  Description  INTERVENTIONS  - Identify patients at risk for skin breakdown  - Assess and monitor skin integrity  - Assess and monitor nutrition and hydration status  - Monitor labs (i e  albumin)  - Assess for incontinence   - Turn and reposition patient  - Assist with mobility/ambulation  - Relieve pressure over bony prominences  - Avoid friction and shearing  - Provide appropriate hygiene as needed including keeping skin clean and dry  - Evaluate need for skin moisturizer/barrier cream  - Collaborate with interdisciplinary team (i e  Nutrition, Rehabilitation, etc )   - Patient/family teaching  Outcome: Progressing     Problem: HEMATOLOGIC - ADULT  Goal: Maintains hematologic stability  Description  INTERVENTIONS  - Assess for signs and symptoms of bleeding or hemorrhage  - Monitor labs  - Administer supportive blood products/factors as ordered and appropriate  Outcome: Progressing     Problem: MUSCULOSKELETAL - ADULT  Goal: Maintain or return mobility to safest level of function  Description  INTERVENTIONS:  - Assess patient's ability to carry out ADLs; assess patient's baseline for ADL function and identify physical deficits which impact ability to perform ADLs (bathing, care of mouth/teeth, toileting, grooming, dressing, etc )  - Assess/evaluate cause of self-care deficits   - Assess range of motion  - Assess patient's mobility  - Assess patient's need for assistive devices and provide as appropriate  - Encourage maximum independence but intervene and supervise when necessary  - Involve family in performance of ADLs  - Assess for home care needs following discharge   - Consider OT consult to assist with ADL evaluation and planning for discharge  - Provide patient education as appropriate  Outcome: Progressing     Problem: PAIN - ADULT  Goal: Verbalizes/displays adequate comfort level or baseline comfort level  Description  Interventions:  - Encourage patient to monitor pain and request assistance  - Assess pain using appropriate pain scale  - Administer analgesics based on type and severity of pain and evaluate response  - Implement non-pharmacological measures as appropriate and evaluate response  - Consider cultural and social influences on pain and pain management  - Notify physician/advanced practitioner if interventions unsuccessful or patient reports new pain  Outcome: Progressing     Problem: INFECTION - ADULT  Goal: Absence or prevention of progression during hospitalization  Description  INTERVENTIONS:  - Assess and monitor for signs and symptoms of infection  - Monitor lab/diagnostic results  - Monitor all insertion sites, i e  indwelling lines, tubes, and drains  - Monitor endotracheal if appropriate and nasal secretions for changes in amount and color  - [place] appropriate cooling/warming therapies per order  - Administer medications as ordered  - Instruct and encourage patient and family to use good hand hygiene technique  - Identify and instruct in appropriate isolation precautions for identified infection/condition  Outcome: Progressing     Problem: SAFETY ADULT  Goal: Patient will remain free of falls  Description  INTERVENTIONS:  - Assess patient frequently for physical needs  -  Identify cognitive and physical deficits and behaviors that affect risk of falls    -  [place] fall precautions as indicated by assessment   - Educate patient/family on patient safety including physical limitations  - Instruct patient to call for assistance with activity based on assessment  - Modify environment to reduce risk of injury  - Consider OT/PT consult to assist with strengthening/mobility  Outcome: Progressing  Goal: Maintain or return to baseline ADL function  Description  INTERVENTIONS:  -  Assess patient's ability to carry out ADLs; assess patient's baseline for ADL function and identify physical deficits which impact ability to perform ADLs (bathing, care of mouth/teeth, toileting, grooming, dressing, etc )  - Assess/evaluate cause of self-care deficits   - Assess range of motion  - Assess patient's mobility; develop plan if impaired  - Assess patient's need for assistive devices and provide as appropriate  - Encourage maximum independence but intervene and supervise when necessary  - Involve family in performance of ADLs  - Assess for home care needs following discharge   - Consider OT consult to assist with ADL evaluation and planning for discharge  - Provide patient education as appropriate  Outcome: Progressing  Goal: Maintain or return mobility status to optimal level  Description  INTERVENTIONS:  - Assess patient's baseline mobility status (ambulation, transfers, stairs, etc )    - Identify cognitive and physical deficits and behaviors that affect mobility  - Identify mobility aids required to assist with transfers and/or ambulation (gait belt, sit-to-stand, lift, walker, cane, etc )  - [place] fall precautions as indicated by assessment  - Record patient progress and toleration of activity level on Mobility SBAR; progress patient to next Phase/Stage  - Instruct patient to call for assistance with activity based on assessment  - Consider rehabilitation consult to assist with strengthening/weightbearing, etc   Outcome: Progressing     Problem: DISCHARGE PLANNING  Goal: Discharge to home or other facility with appropriate resources  Description  INTERVENTIONS:  - Identify barriers to discharge w/patient and caregiver  - Arrange for needed discharge resources and transportation as appropriate  - Identify discharge learning needs (meds, wound care, etc )  - Arrange for interpretive services to assist at discharge as needed  - Refer to Case Management Department for coordinating discharge planning if the patient needs post-hospital services based on physician/advanced practitioner order or complex needs related to functional status, cognitive ability, or social support system  Outcome: Progressing     Problem: Knowledge Deficit  Goal: Patient/family/caregiver demonstrates understanding of disease process, treatment plan, medications, and discharge instructions  Description  Complete learning assessment and assess knowledge base  Interventions:  - Provide teaching at level of understanding  - Provide teaching via preferred learning methods  Outcome: Progressing     Problem: Potential for Falls  Goal: Patient will remain free of falls  Description  INTERVENTIONS:  - Assess patient frequently for physical needs  -  Identify cognitive and physical deficits and behaviors that affect risk of falls  -  [place] fall precautions as indicated by assessment   - Educate patient/family on patient safety including physical limitations  - Instruct patient to call for assistance with activity based on assessment  - Modify environment to reduce risk of injury  - Consider OT/PT consult to assist with strengthening/mobility  Outcome: Progressing     Problem: SAFETY,RESTRAINT: NV/NON-SELF DESTRUCTIVE BEHAVIOR  Goal: Remains free of harm/injury (restraint for non violent/non self-detsructive behavior)  Description  INTERVENTIONS:  - Instruct patient/family regarding restraint use   - Assess and monitor physiologic and psychological status   - Provide interventions and comfort measures to meet assessed patient needs   - Identify and implement measures to help patient regain control  - Assess readiness for release of restraint   Outcome: Progressing  Goal: Returns to optimal restraint-free functioning  Description  INTERVENTIONS:  - Assess the patient's behavior and symptoms that indicate continued need for restraint  - Identify and implement measures to help patient regain control  - Assess readiness for release of restraint   Outcome: Progressing     Problem: Nutrition/Hydration-ADULT  Goal: Nutrient/Hydration intake appropriate for improving, restoring or maintaining nutritional needs  Description  Monitor and assess patient's nutrition/hydration status for malnutrition   Collaborate with interdisciplinary team and initiate plan and interventions as ordered  Monitor patient's weight and dietary intake as ordered or per policy  Utilize nutrition screening tool and intervene as necessary  Determine patient's food preferences and provide high-protein, high-caloric foods as appropriate  INTERVENTIONS:  - Monitor oral intake, urinary output, labs, and treatment plans  - Assess nutrition and hydration status and recommend course of action  - Evaluate amount of meals eaten  - Assist patient with eating if necessary   - Allow adequate time for meals  - Recommend/ encourage appropriate diets, oral nutritional supplements, and vitamin/mineral supplements  - Order, calculate, and assess calorie counts as needed  - Recommend, monitor, and adjust tube feedings and TPN/PPN based on assessed needs  - Assess need for intravenous fluids  - Provide specific nutrition/hydration education as appropriate  - Include patient/family/caregiver in decisions related to nutrition  Outcome: Progressing     Problem: CONFUSION/THOUGHT DISTURBANCE  Goal: Thought disturbances (confusion, delirium, depression, dementia or psychosis) are managed to maintain or return to baseline mental status and functional level  Description  INTERVENTIONS:  - Assess for possible contributors to  thought disturbance, including but not limited to medications, infection, impaired vision or hearing, underlying metabolic abnormalities, dehydration, respiratory compromise,  psychiatric diagnoses and notify attending PHYSICAN/AP  - Monitor and intervene to maintain adequate nutrition, hydration, elimination, sleep and activity  - Decrease environmental stimuli, including noise as appropriate  - Provide frequent contacts to provide refocusing, direction and reassurance as needed  Approach patient calmly with eye contact and at their level    - [place] high risk fall precautions, aspiration precautions and other safety measures, as indicated  - If delirium suspected, notify physician/AP of change in condition and request immediate in-person evaluation  - Pursue consults as appropriate including Geriatric (campus dependent), OT for cognitive evaluation/activity planning, psychiatric, pastoral care, etc   Outcome: Progressing     Problem: BEHAVIOR  Goal: Pt/Family maintain appropriate behavior and adhere to behavioral management agreement, if implemented  Description  INTERVENTIONS:  - Assess the family dynamic   - Encourage verbalization of thoughts and concerns in a socially appropriate manner  - Assess patient/family's coping skills and non-compliant behavior (including use of illegal substances)  - Utilize positive, consistent limit setting strategies supporting safety of patient, staff and others  - Initiate consult with Case Management, Spiritual Care or other ancillary services as appropriate  - If a patient's/visitor's behavior jeopardizes the safety of the patient, staff, or others, refer to organization procedure  - Notify Security of behavior or suspected illegal substances which indicate the need for search of the patient and/or belongings  - Encourage participation in the decision making process about a behavioral management agreement; implement if patient meets criteria  Outcome: Progressing     Problem: SELF HARM  Goal: Effect of psychiatric condition will be minimized and patient will be protected from self harm  Description  INTERVENTIONS:  - Assess impact of patient's symptoms on level of functioning, self-care needs and offer support as indicated  - Assess patient/family knowledge of depression, impact on illness and need for teaching  - Provide emotional support, presence and reassurance  - Assess for possible suicidal thoughts, ideation or self-harm  If patient expresses suicidal thoughts or statements do not leave alone, notify physician/AP immediately, initiate suicide precautions, and determine need for continual observation    - initiate consults and referrals as appropriate (a mental health professional, Spiritual Care  Outcome: Progressing

## 2019-09-25 NOTE — PROGRESS NOTES
Progress Note Anupama Arias 1/30/1924, 80 y o  male MRN: [id number]    Unit/Bed#: [facility] 717-01 Encounter: [id number]    Primary Care Provider: Annmarie Guevara DO   Date and time admitted to hospital: 9/22/2019  6:37 PM        * SAH (subarachnoid hemorrhage) [facility])  Assessment & Plan  Neuro appreciated  NO AP or AC  Keep SBP < 140  PT/OT  Likely etiology for delirium    Encephalopathy  Assessment & Plan  Likely hospital delirium in setting of [facility]  Geriatric consult  Avoid deliriogenic medications  Maintain circadian rhythm    Aphasia  Assessment & Plan  Likely to [facility]  Continue to work with speech therapy  Will need discharge to rehab setting    A-fib [facility])  Assessment & Plan  Current sinus  No rate control medicines  No AC due to bleed    Coronary artery disease involving native coronary artery of native heart without angina pectoris  Assessment & Plan  S/p remote BMS  Pt on no meds at home    Essential hypertension  Assessment & Plan  Continue Norvasc  Goal SBP < 140  Labetalol prn        VTE Pharmacologic Prophylaxis:   Pharmacologic: Pharmacologic VTE Prophylaxis contraindicated due to [facility]  Mechanical VTE Prophylaxis in Place: Yes    Patient Centered Rounds: I have performed bedside rounds with nursing staff today  Discussions with Specialists or Other Care Team Provider: No    Education and Discussions with Family / Patient: Discussed with son    Time Spent for Care: 45 minutes  More than 50% of total time spent on counseling and coordination of care as described above  Current Length of Stay: 3 day(s)    Current Patient Status: Inpatient   Certification Statement: The patient will continue to require additional inpatient hospital stay due to evaluation for rehab placement    Discharge Plan: likely discharge to rehab in 2-3 days    Code Status: Level 3 - DNAR and DNI      Subjective:   No acute overnight events  Patient isn't able to verbalize  EEG currently underway      Objective:     Vitals:   Temp (24hrs), Av 1 °F (36 7 °C), Min:98 1 °F (36 7 °C), Max:98 1 °F (36 7 °C)    Temp:  [98 1 °F (36 7 °C)] 98 1 °F (36 7 °C)  HR:  [57-88] 68  Resp:  [16-18] 18  BP: (108-179)/() 141/83  SpO2:  [97 %-100 %] 99 %  Body mass index is 23 54 kg/m²  Input and Output Summary (last 24 hours): Intake/Output Summary (Last 24 hours) at 2019 1725  Last data filed at 2019 1252  Gross per 24 hour   Intake 358 ml   Output 3025 ml   Net -2667 ml       Physical Exam:     Physical Exam   Constitutional: He appears well-developed and well-nourished  HENT:   Head: Normocephalic and atraumatic  Mouth/Throat: Oropharynx is clear and moist    Eyes: Pupils are equal, round, and reactive to light  Conjunctivae are normal    Neck: Neck supple  No JVD present  Cardiovascular: Normal rate, regular rhythm, normal heart sounds and intact distal pulses  Pulmonary/Chest: Effort normal and breath sounds normal    Abdominal: Bowel sounds are normal    Musculoskeletal: He exhibits no edema  Neurological: He is alert  Oriented to person   Skin: Skin is warm and dry  Capillary refill takes less than 2 seconds  Psychiatric: He has a normal mood and affect  His behavior is normal  Judgment and thought content normal    Nursing note and vitals reviewed          Additional Data:     Labs:    Results from last 7 days   Lab Units 19  0648   WBC Thousand/uL 8 41   HEMOGLOBIN g/dL 13 3   HEMATOCRIT % 39 1   PLATELETS Thousands/uL 160   NEUTROS PCT % 76*   LYMPHS PCT % 15   MONOS PCT % 6   EOS PCT % 1     Results from last 7 days   Lab Units 19  0618   SODIUM mmol/L 137   POTASSIUM mmol/L 3 6   CHLORIDE mmol/L 108   CO2 mmol/L 24   BUN mg/dL 29*   CREATININE mg/dL 1 92*   ANION GAP mmol/L 5   CALCIUM mg/dL 8 6   GLUCOSE RANDOM mg/dL 95     Results from last 7 days   Lab Units 19  1750   INR  1 14     Results from last 7 days   Lab Units 19  1606   POC GLUCOSE mg/dl 97     Results from last 7 days Lab Units 09/23/19  0451   HEMOGLOBIN A1C % 5 8     Results from last 7 days   Lab Units 09/22/19  1606   LACTIC ACID mmol/L 1 3           * I Have Reviewed All Lab Data Listed Above  * Additional Pertinent Lab Tests Reviewed: All Labs Within Last 24 Hours Reviewed    Imaging:    Imaging Reports Reviewed Today Include: n/a  Imaging Personally Reviewed by Myself Includes:  n/a    Recent Cultures (last 7 days):           Last 24 Hours Medication List:     Current Facility-Administered Medications:  acetaminophen 975 mg Oral [facility] [place] Tuesday M MARC Moreno   amLODIPine 5 mg Oral Daily Kelle Harp DO   divalproex sodium 500 mg Oral QPM Pietro Frankel MD   Labetalol HCl 5 mg Intravenous Q4H Rherobin Gonzalez PA-C   melatonin 3 mg Oral HS Pietro Frankel MD   nystatin  Topical BID Kenroy Manning DO   OLANZapine 2 5 mg Intramuscular Once PRN Kelle Harp DO        Today, Patient Was Seen By: Juan Hayes MD    ** Please Note: Dictation voice to text software may have been used in the creation of this document   **

## 2019-09-25 NOTE — QUICK NOTE
Reviewed chart  Patient on stroke pathway  Attempted to meet with patient to discuss follow up care and stroke/SAH education  Patient confused, not receptive to teaching  No family present  Left stroke education binder and my card at bedside  Will follow up

## 2019-09-25 NOTE — PLAN OF CARE
Problem: SAFETY,RESTRAINT: NV/NON-SELF DESTRUCTIVE BEHAVIOR  Goal: Remains free of harm/injury (restraint for non violent/non self-detsructive behavior)  Description  INTERVENTIONS:  - Instruct patient/family regarding restraint use   - Assess and monitor physiologic and psychological status   - Provide interventions and comfort measures to meet assessed patient needs   - Identify and implement measures to help patient regain control  - Assess readiness for release of restraint    9/25/2019 1624 by Clarinda Cranker, RN  Outcome: Progressing  9/25/2019 1624 by Clarinda Cranker, RN  Reactivated  Goal: Returns to optimal restraint-free functioning  Description  INTERVENTIONS:  - Assess the patient's behavior and symptoms that indicate continued need for restraint  - Identify and implement measures to help patient regain control  - Assess readiness for release of restraint    9/25/2019 1624 by Clarinda Cranker, RN  Outcome: Progressing  9/25/2019 1624 by Clarinda Cranker, RN  Reactivated

## 2019-09-25 NOTE — ASSESSMENT & PLAN NOTE
Likely hospital delirium in setting of [facility]  Geriatric consult  Avoid deliriogenic medications  Maintain circadian rhythm

## 2019-09-25 NOTE — SPEECH THERAPY NOTE
Speech Language/Pathology  Pt sleeping, attempted to awaken for speech eval- put on the lights, placed pt upright  Pt did not wake up, unableto fully awaken   Pt was able to tolerate his whole lunch w/o overt dysphagia

## 2019-09-26 ENCOUNTER — APPOINTMENT (INPATIENT)
Dept: NEUROLOGY | Facility: CLINIC | Age: 84
DRG: 082 | End: 2019-09-26
Payer: MEDICARE

## 2019-09-26 ENCOUNTER — APPOINTMENT (INPATIENT)
Dept: RADIOLOGY | Facility: HOSPITAL | Age: 84
DRG: 082 | End: 2019-09-26
Payer: MEDICARE

## 2019-09-26 LAB
ANION GAP SERPL CALCULATED.3IONS-SCNC: 6 MMOL/L (ref 4–13)
BUN SERPL-MCNC: 34 MG/DL (ref 5–25)
CALCIUM SERPL-MCNC: 8.8 MG/DL (ref 8.3–10.1)
CHLORIDE SERPL-SCNC: 107 MMOL/L (ref 100–108)
CO2 SERPL-SCNC: 26 MMOL/L (ref 21–32)
CREAT SERPL-MCNC: 2.02 MG/DL (ref 0.6–1.3)
GFR SERPL CREATININE-BSD FRML MDRD: 27 ML/MIN/1.73SQ M
GLUCOSE SERPL-MCNC: 80 MG/DL (ref 65–140)
POTASSIUM SERPL-SCNC: 4 MMOL/L (ref 3.5–5.3)
SODIUM SERPL-SCNC: 139 MMOL/L (ref 136–145)

## 2019-09-26 PROCEDURE — 70551 MRI BRAIN STEM W/O DYE: CPT

## 2019-09-26 PROCEDURE — 92610 EVALUATE SWALLOWING FUNCTION: CPT

## 2019-09-26 PROCEDURE — 80048 BASIC METABOLIC PNL TOTAL CA: CPT | Performed by: INTERNAL MEDICINE

## 2019-09-26 PROCEDURE — 95951 HB EEG MONITORING/VIDEORECORD: CPT

## 2019-09-26 PROCEDURE — G8996 SWALLOW CURRENT STATUS: HCPCS

## 2019-09-26 PROCEDURE — 70450 CT HEAD/BRAIN W/O DYE: CPT

## 2019-09-26 PROCEDURE — G8997 SWALLOW GOAL STATUS: HCPCS

## 2019-09-26 PROCEDURE — 95951 PR EEG MONITORING/VIDEORECORD: CPT | Performed by: PSYCHIATRY & NEUROLOGY

## 2019-09-26 PROCEDURE — 99233 SBSQ HOSP IP/OBS HIGH 50: CPT | Performed by: INTERNAL MEDICINE

## 2019-09-26 RX ADMIN — ACETAMINOPHEN 975 MG: 325 TABLET ORAL at 22:14

## 2019-09-26 RX ADMIN — NYSTATIN: 100000 POWDER TOPICAL at 08:44

## 2019-09-26 RX ADMIN — DIVALPROEX SODIUM 500 MG: 500 TABLET, DELAYED RELEASE ORAL at 17:56

## 2019-09-26 RX ADMIN — MELATONIN 3 MG: 3 TAB ORAL at 22:15

## 2019-09-26 RX ADMIN — AMLODIPINE BESYLATE 5 MG: 5 TABLET ORAL at 08:42

## 2019-09-26 RX ADMIN — ACETAMINOPHEN 975 MG: 325 TABLET ORAL at 14:28

## 2019-09-26 RX ADMIN — NYSTATIN: 100000 POWDER TOPICAL at 17:54

## 2019-09-26 NOTE — ASSESSMENT & PLAN NOTE
Likely to [facility]  Continue to work with speech therapy  Will need discharge to rehab setting  improved

## 2019-09-26 NOTE — PROGRESS NOTES
Progress Note Nina Britt 1/30/1924, 80 y o  male MRN: [id number]    [facility] Encounter: [id number]    Primary Care Provider: Maurice Ellison DO   Date and time admitted to hospital: 9/22/2019  6:37 PM        * SAH (subarachnoid hemorrhage) [facility])  Assessment & Plan  Neuro input appreciated  NO AP or AC  Keep SBP < 140  PT/OT  Likely etiology for delirium    Encephalopathy  Assessment & Plan  Likely hospital delirium in setting of [facility]  Geriatric consult  Avoid deliriogenic medications  Maintain circadian rhythm    Aphasia  Assessment & Plan  Likely to [facility]  Continue to work with speech therapy  Will need discharge to rehab setting  improved    A-fib ([address] )  Assessment & Plan  Current sinus  No rate control medicines  No AC due to bleed    Coronary artery disease involving native coronary artery of native heart without angina pectoris  Assessment & Plan  S/p remote BMS  Pt on no meds at home    Essential hypertension  Assessment & Plan  Continue Norvasc  Goal SBP < 140  Labetalol prn      VTE Pharmacologic Prophylaxis:   Pharmacologic: Pharmacologic VTE Prophylaxis contraindicated due to [facility]  Mechanical VTE Prophylaxis in Place: Yes    Patient Centered Rounds: I have performed bedside rounds with nursing staff today  Discussions with Specialists or Other Care Team Provider: neurology    Education and Discussions with Family / Patient: patient and family    Time Spent for Care: 45 minutes  More than 50% of total time spent on counseling and coordination of care as described above  Current Length of Stay: 4 day(s)    Current Patient Status: Inpatient   Certification Statement: The patient will continue to require additional inpatient hospital stay due to rehab placement    Discharge Plan: likely d/c to rehab in 1-2 days    Code Status: Level 3 - DNAR and DNI      Subjective:   No acute overnight events  This morning patient states he felt well  He denies any CP, SOB, fevers, HA   He has a good appetite  He was able to tell me the year and place and   Objective:     Vitals:   Temp (24hrs), Av 4 °F (36 9 °C), Min:97 1 °F (36 2 °C), Max:99 °F (37 2 °C)    Temp:  [97 1 °F (36 2 °C)-99 °F (37 2 °C)] 97 1 °F (36 2 °C)  HR:  [53-73] 63  Resp:  [14-18] 18  BP: ()/(51-78) 132/76  SpO2:  [96 %-97 %] 97 %  Body mass index is 23 54 kg/m²  Input and Output Summary (last 24 hours): Intake/Output Summary (Last 24 hours) at 2019 1846  Last data filed at 2019 1347  Gross per 24 hour   Intake 430 ml   Output 1100 ml   Net -670 ml       Physical Exam:     Physical Exam   Constitutional: He is oriented to person, place, and time  He appears well-developed and well-nourished  HENT:   Head: Normocephalic and atraumatic  Eyes: Pupils are equal, round, and reactive to light  Conjunctivae are normal    Neck: Neck supple  No JVD present  Cardiovascular: Normal rate, regular rhythm, normal heart sounds and intact distal pulses  Pulmonary/Chest: Effort normal and breath sounds normal    Abdominal: Soft  Bowel sounds are normal    Musculoskeletal: He exhibits no edema  Neurological: He is alert and oriented to person, place, and time  Skin: Skin is warm and dry  Capillary refill takes less than 2 seconds  Psychiatric: He has a normal mood and affect  His behavior is normal  Judgment and thought content normal    Nursing note and vitals reviewed          Additional Data:     Labs:    Results from last 7 days   Lab Units 19  0648   WBC Thousand/uL 8 41   HEMOGLOBIN g/dL 13 3   HEMATOCRIT % 39 1   PLATELETS Thousands/uL 160   NEUTROS PCT % 76*   LYMPHS PCT % 15   MONOS PCT % 6   EOS PCT % 1     Results from last 7 days   Lab Units 19  0520   SODIUM mmol/L 139   POTASSIUM mmol/L 4 0   CHLORIDE mmol/L 107   CO2 mmol/L 26   BUN mg/dL 34*   CREATININE mg/dL 2 02*   ANION GAP mmol/L 6   CALCIUM mg/dL 8 8   GLUCOSE RANDOM mg/dL 80     Results from last 7 days   Lab Units 09/22/19  1750   INR  1 14     Results from last 7 days   Lab Units 09/22/19  1606   POC GLUCOSE mg/dl 97     Results from last 7 days   Lab Units 09/23/19  0451   HEMOGLOBIN A1C % 5 8     Results from last 7 days   Lab Units 09/22/19  1606   LACTIC ACID mmol/L 1 3           * I Have Reviewed All Lab Data Listed Above  * Additional Pertinent Lab Tests Reviewed: All Labs Within Last 24 Hours Reviewed    Imaging:    Imaging Reports Reviewed Today Include: n/a  Imaging Personally Reviewed by Myself Includes:  n/a    Recent Cultures (last 7 days):           Last 24 Hours Medication List:     Current Facility-Administered Medications:  acetaminophen 975 mg Oral [facility] [address] Tuesday M MARC Moreno   amLODIPine 5 mg Oral Daily Buster Mckenzie DO   divalproex sodium 500 mg Oral QPM Deisy Muro MD   Labetalol HCl 5 mg Intravenous Q4H PRN Alfonso Will MD   melatonin 3 mg Oral HS Deisy Muro MD   nystatin  Topical BID Lilibeth Lamp,    OLANZapine 2 5 mg Intramuscular Once PRN Buster Mckenzie DO        Today, Patient Was Seen By: Alfonso Will MD    ** Please Note: Dictation voice to text software may have been used in the creation of this document   **

## 2019-09-26 NOTE — SPEECH THERAPY NOTE
Speech Language/Pathology  Speech/Language Pathology Dysphagia Assessment    Patient Name: Blane Green  IKFSF'C Date: 9/26/2019     Problem List  Principal Problem:    SAH (subarachnoid hemorrhage) ([address] )  Active Problems:    Essential hypertension    Coronary artery disease involving native coronary artery of native heart without angina pectoris    A-fib (HCC)    Aphasia    Encephalopathy    Past Medical History  Past Medical History:   Diagnosis Date    Arthritis     Athscl heart disease of native coronary artery w/o ang pctrs     Coronary angioplasty status     HTN (hypertension)     Hx of echocardiogram 04/06/2012    EF 0 65, Normal LV function   Hyperlipidemia     Right bundle branch block     Ventricular premature depolarization      Past Surgical History  Past Surgical History:   Procedure Laterality Date    CARDIAC CATHETERIZATION  04/04/2012    Single vessel CAD; Successful bare metal stent placed in the distal RCA        Bedside Swallow Evaluation:    Summary:  Pt presents w/moderate oral dysphagia marked by prolonged and ineffective mastication of solids  Pt had to spit out portion of crabcake as he was unable to process it  Pharyngeal stage of swallowing appeared wfls  Recommendations:  Diet: dysphagia level 2  Liquid: thin  Meds: as desired/tolerated  Supervision: yes; feed  Positioning:Upright  Strategies: Pt to take PO/Meds only when fully alert and upright  Oral care: daily  Aspiration precautions  Reflux precautions    Therapy Prognosis: fair  Prognosis considerations: age, support  Frequency: 2-5x per week    Speech/language evaluation    Patient's goal: unable to state    Consider consult w/:  Nutrition      HPI:  From MD notes:  Blane Green is a 80 y o  male who presents to the hospital in 9/22 initially as a stroke alert at [facility] for altered mental status and aphasia    He was thought to have drift of the left upper and lower extremity, and for this reason head CT and CTA of the head and neck  This displayed a possible left central sulcus subarachnoid hemorrhage, as well as right frontal subarachnoid hemorrhage  For this reason he was transferred to [facility], and was admitted to the MICU  Upon admission to the MICU, he was found to have some aphasia, but otherwise nonfocal neurologic exam   He was noted to be disoriented, however was thought that this was his baseline mental status  He was monitored in the ICU, and eventually transfer to step-down level 2  Neurology evaluated the patient, and recommended MRI for further workup stroke  He also had a repeat CT scan on 09/23, which was stable  During his hospitalization in the MICU, family revealed that the patient had had several recent falls, and noted that prior to his evaluation at  [facility], that his wife might have heard a thud overnight prior to the patient being found with altered mental status  For this reason the trauma team was consulted  Currently, the patient denies pain, however history is slightly limited secondary to baseline mental status with possible overlying delirium  Of note, the patient required security intervention overnight due to agitated behavior  He pulled out his right hand IV, requiring a dressing  Mechanism:Fall      Per daughter Kraig Ganser who is at bedside, pt has had a steady decline in speech/language and cognitive skills over several months  She also reports that he has required only very soft foods  Reason for consult:  R/o aspiration  Determine safest and least restrictive diet  Cog/language concerns      Current diet:  Regular with thin liquids  Premorbid diet[de-identified]  Mechanical soft per daughter's description  Previous VBS:  none  O2 requirement:  none  Voice/Speech:  Minimal verbal output  Unable to name his daughter from choice of 2  Social:  Lives with wife; son across the street  Dtrs nearby    Follows commands:  Some simple commands Cognitive Status:  Awake but somnolent  Oral [facility] exam:  Dentition: natural  Labial strength and ROM: difficult to assess; suspect decreased strength  Lingual strength and ROM: difficult to assess; suspect decreased strength  Items administered:  Crabcake, carrots, thin OJ via straw, ice cream    Oral stage:  Lip closure: good  Mastication: prolonged and ineffective for large pieces of crab and carrots  Bolus formation: decreased  Bolus control: good  Transfer: mildly weak  Oral residue: yes  Pocketing: yes    Pharyngeal stage:  Swallow promptness: appeared prompt  Laryngeal rise: fair per palpation  Wet voice: x 1 with thin liquids  Throat clear: x 1 with thin liquids  Cough: no  Secondary swallows: no  Audible swallows: no    Esophageal stage:  No s/s reported    Results d/w:  Pt, nursing,l famiy, physician    Goal(s):  Pt will tolerate least restrictive diet w/out s/s aspiration or oral/pharyngeal difficulties

## 2019-09-26 NOTE — QUICK NOTE
Attempted to meet with patient to discuss follow up care and stroke education  Patient confused, resting at this time  No family present  Will follow up

## 2019-09-26 NOTE — SOCIAL WORK
Met with pt's Jagdish Chen as she had questions in regards to pt's discharge plan  Informed her that therapy is recommending inpt rehab  Also informed her that CM left a message for pt's son Sondra Paez to discuss same  She stated that she will discuss this recommendation with him  CM reviewed acute and subacute rehab  A post acute care recommendation was made by your care team for Acute Rehab and STR  Discussed [place] of Choice with patient's dgt  List of facilities given to patient's dgt via in person  patient's dgt aware the list is custom filtered for them by zip code location and that [facility] post acute providers are designated  She stated that she will discuss these options with pt's family and review the inpt rehab lists with pt's family

## 2019-09-26 NOTE — QUICK NOTE
Patient is alert and oriented to person, but not to time or place  His neurological exam remains the same as yesterday, no changes noted  No epileptic wave forms noted on EEG for more than 24 hr period, will discontinue

## 2019-09-26 NOTE — ASSESSMENT & PLAN NOTE
Likely hospital delirium in setting of [facility]  Geriatric consult  Avoid deliriogenic medications  Maintain circadian rhythm

## 2019-09-27 PROBLEM — N17.9 AKI (ACUTE KIDNEY INJURY) (HCC): Status: ACTIVE | Noted: 2019-09-27

## 2019-09-27 LAB
ANION GAP SERPL CALCULATED.3IONS-SCNC: 6 MMOL/L (ref 4–13)
BUN SERPL-MCNC: 35 MG/DL (ref 5–25)
CALCIUM SERPL-MCNC: 8.9 MG/DL (ref 8.3–10.1)
CHLORIDE SERPL-SCNC: 108 MMOL/L (ref 100–108)
CO2 SERPL-SCNC: 24 MMOL/L (ref 21–32)
CREAT SERPL-MCNC: 2.06 MG/DL (ref 0.6–1.3)
GFR SERPL CREATININE-BSD FRML MDRD: 27 ML/MIN/1.73SQ M
GLUCOSE SERPL-MCNC: 99 MG/DL (ref 65–140)
POTASSIUM SERPL-SCNC: 4 MMOL/L (ref 3.5–5.3)
SODIUM SERPL-SCNC: 138 MMOL/L (ref 136–145)

## 2019-09-27 PROCEDURE — 99233 SBSQ HOSP IP/OBS HIGH 50: CPT | Performed by: PSYCHIATRY & NEUROLOGY

## 2019-09-27 PROCEDURE — 80048 BASIC METABOLIC PNL TOTAL CA: CPT | Performed by: INTERNAL MEDICINE

## 2019-09-27 PROCEDURE — 99233 SBSQ HOSP IP/OBS HIGH 50: CPT | Performed by: INTERNAL MEDICINE

## 2019-09-27 RX ORDER — DEXTROSE AND SODIUM CHLORIDE 5; .45 G/100ML; G/100ML
125 INJECTION, SOLUTION INTRAVENOUS CONTINUOUS
Status: DISCONTINUED | OUTPATIENT
Start: 2019-09-27 | End: 2019-09-30

## 2019-09-27 RX ORDER — DEXTROSE AND SODIUM CHLORIDE 5; .45 G/100ML; G/100ML
75 INJECTION, SOLUTION INTRAVENOUS CONTINUOUS
Status: DISCONTINUED | OUTPATIENT
Start: 2019-09-27 | End: 2019-09-27

## 2019-09-27 RX ADMIN — WATER 2.2 ML: 1 INJECTION INTRAMUSCULAR; INTRAVENOUS; SUBCUTANEOUS at 09:22

## 2019-09-27 RX ADMIN — NYSTATIN: 100000 POWDER TOPICAL at 19:00

## 2019-09-27 RX ADMIN — OLANZAPINE 2.5 MG: 10 INJECTION, POWDER, FOR SOLUTION INTRAMUSCULAR at 09:21

## 2019-09-27 RX ADMIN — ACETAMINOPHEN 975 MG: 325 TABLET ORAL at 05:04

## 2019-09-27 RX ADMIN — NYSTATIN: 100000 POWDER TOPICAL at 09:22

## 2019-09-27 RX ADMIN — DEXTROSE AND SODIUM CHLORIDE 75 ML/HR: 5; .45 INJECTION, SOLUTION INTRAVENOUS at 20:33

## 2019-09-27 RX ADMIN — MELATONIN 3 MG: 3 TAB ORAL at 23:00

## 2019-09-27 RX ADMIN — AMLODIPINE BESYLATE 5 MG: 5 TABLET ORAL at 11:37

## 2019-09-27 RX ADMIN — DIVALPROEX SODIUM 500 MG: 500 TABLET, DELAYED RELEASE ORAL at 18:13

## 2019-09-27 NOTE — ASSESSMENT & PLAN NOTE
Likely hospital delirium in setting of [facility]  Geriatric consult  Avoid deliriogenic medications  Maintain circadian rhythm  Encourage hydration  Started D5 1/2NS for 1 day

## 2019-09-27 NOTE — PROGRESS NOTES
Progress Note John Bolden 1/30/1924, 80 y o  male MRN: [id number]    Unit/Bed#: [facility] 717-01 Encounter: [id number]    Primary Care Provider: Maeve Christiansen DO   Date and time admitted to hospital: 9/22/2019  6:37 PM        * SAH (subarachnoid hemorrhage) [facility])  Assessment & Plan  Neuro input appreciated  NO AP or AC  MRI and CT head shows stable hemorrhage  Continue PT/OT    Encephalopathy  Assessment & Plan  Likely hospital delirium in setting of [address] vs [facility]  Geriatric consult  Avoid deliriogenic medications  Maintain circadian rhythm  Encourage hydration  Started D5 1/2NS for 1 day    Aphasia  Assessment & Plan  Likely to [address]  Continue to work with speech therapy  Will need discharge to rehab setting  improved    A-fib ([address] )  Assessment & Plan  Currently sinus  No rate control medicines  No AC due to bleed    Coronary artery disease involving native coronary artery of native heart without angina pectoris  Assessment & Plan  S/p remote BMS  Pt on no meds at home    Essential hypertension  Assessment & Plan  Continue Norvasc  Goal SBP < 140  Labetalol prn        VTE Pharmacologic Prophylaxis:   Pharmacologic: Pharmacologic VTE Prophylaxis contraindicated due to [address]  Mechanical VTE Prophylaxis in Place: Yes    Patient Centered Rounds: I have performed bedside rounds with nursing staff today  Discussions with Specialists or Other Care Team Provider: neurology    Education and Discussions with Family / Patient: patient    Time Spent for Care: 45 minutes  More than 50% of total time spent on counseling and coordination of care as described above  Current Length of Stay: 5 day(s)    Current Patient Status: Inpatient   Certification Statement: The patient will continue to require additional inpatient hospital stay due to placement for rehab    Discharge Plan: rehab in 1-2 days    Code Status: Level 3 - DNAR and DNI      Subjective:   No acute overnight events   This morning patient was lethargic after receiving zyprexa  Otherwise he slept most of the day  Objective:     Vitals:   Temp (24hrs), Av 2 °F (36 2 °C), Min:96 5 °F (35 8 °C), Max:98 4 °F (36 9 °C)    Temp:  [96 5 °F (35 8 °C)-98 4 °F (36 9 °C)] 96 7 °F (35 9 °C)  HR:  [71] 71  Resp:  [16-20] 16  BP: (132-138)/(76-82) 138/82  SpO2:  [95 %-98 %] 95 %  Body mass index is 23 54 kg/m²  Input and Output Summary (last 24 hours): Intake/Output Summary (Last 24 hours) at 2019 1658  Last data filed at 2019 1139  Gross per 24 hour   Intake 240 ml   Output 1475 ml   Net -1235 ml       Physical Exam:     Physical Exam   Constitutional: He appears well-developed and well-nourished  HENT:   Head: Normocephalic and atraumatic  Mouth/Throat: Oropharynx is clear and moist    Eyes: Pupils are equal, round, and reactive to light  Conjunctivae are normal    Neck: Neck supple  No JVD present  Cardiovascular: Normal rate, regular rhythm, normal heart sounds and intact distal pulses  Pulmonary/Chest: Effort normal and breath sounds normal    Abdominal: Soft  Bowel sounds are normal    Musculoskeletal: He exhibits no edema  Neurological:   Unable to assess   Skin: Skin is warm and dry  Capillary refill takes less than 2 seconds  Psychiatric:   Unable to assess   Nursing note and vitals reviewed          Additional Data:     Labs:    Results from last 7 days   Lab Units 19  0648   WBC Thousand/uL 8 41   HEMOGLOBIN g/dL 13 3   HEMATOCRIT % 39 1   PLATELETS Thousands/uL 160   NEUTROS PCT % 76*   LYMPHS PCT % 15   MONOS PCT % 6   EOS PCT % 1     Results from last 7 days   Lab Units 19  0503   SODIUM mmol/L 138   POTASSIUM mmol/L 4 0   CHLORIDE mmol/L 108   CO2 mmol/L 24   BUN mg/dL 35*   CREATININE mg/dL 2 06*   ANION GAP mmol/L 6   CALCIUM mg/dL 8 9   GLUCOSE RANDOM mg/dL 99     Results from last 7 days   Lab Units 19  1750   INR  1 14     Results from last 7 days   Lab Units 19  1606   POC GLUCOSE mg/dl 97     Results from last 7 days   Lab Units 09/23/19  0451   HEMOGLOBIN A1C % 5 8     Results from last 7 days   Lab Units 09/22/19  1606   LACTIC ACID mmol/L 1 3           * I Have Reviewed All Lab Data Listed Above  * Additional Pertinent Lab Tests Reviewed: All Labs Within Last 24 Hours Reviewed    Imaging:    Imaging Reports Reviewed Today Include: MRI and CT head  Imaging Personally Reviewed by Myself Includes:  CT head and MRI    Recent Cultures (last 7 days):           Last 24 Hours Medication List:     Current Facility-Administered Medications:  acetaminophen 975 mg Oral [facility] Tuesday M MARC Moreno   amLODIPine 5 mg Oral Daily Farhan Hemphill DO   dextrose 5 % and sodium chloride 0 45 % 75 mL/hr Intravenous Continuous Anjali Cisneros MD   divalproex sodium 500 mg Oral QPM Dunia Mae MD   Labetalol HCl 5 mg Intravenous Q4H PRN Anjali Cisneros MD   melatonin 3 mg Oral HS Dunia Mae MD   nystatin  Topical BID Debora Fong DO        Today, Patient Was Seen By: Anjali Cisneros MD    ** Please Note: Dictation voice to text software may have been used in the creation of this document   **

## 2019-09-27 NOTE — ASSESSMENT & PLAN NOTE
Likely to [facility]  Continue to work with speech therapy  Will need discharge to rehab setting  improved

## 2019-09-27 NOTE — OCCUPATIONAL THERAPY NOTE
Occupational Therapy Cancel Note:    Occupational therapy attempted however pt nurse reported pt not appropriate at this time 2* pt agitation  OT will attempt again next treatment       Sadia Berry

## 2019-09-27 NOTE — SOCIAL WORK
This writer met with patient, patient's son, Byron Muñoz, and wife, Gail Ruiz, to discuss discharge planning  Patient is recommended for STR at SNF  Family prefers a referral to REHABILITATION [facility]  Referral sent via [facility]  This writer encouraged family to select back up choices for SNFs in the event 's is unable to accept

## 2019-09-27 NOTE — PROGRESS NOTES
Progress Note - Neurology   Selma Jones 80 y o  male MRN: [id number]  [facility] Encounter: [id number]    Attending attestation: Pt seen and examined with Resident Marilin Cochran MD and reviewed pertinent neuroimaging including MRI in PACS and repeat CT head which shows no worsening bleed(recent traumatic SAH) and discussed A/P as noted below and agree with below except following additions/exceptions:    Suspect worsening cognition today due to drug induced toxic metabolic encephalopathy-- patient on my exam today was repeating "I cannot move" and delirious however he did receive Zyprexa overnight and his renal function is worsening  Repeat exam in the afternoon with longer period after Zyprexa given- improved with patient oriented to self more appropriate conversation but still confused  No new focal deficits  Address worsening renal function per primary team  Would avoid sedating medications, BDZ, narcotics, antipsychotics  vEEG unremarkable other than diffuse slowing  Recommend PT/Ot for balance and speech/cognitive therapies  Continue  at 5 pm and melatonin to help with delirium worse in evening and sleep wake cycle  Encourage activity/up in chair at daytime and minimize disruptions at night time  No further neurodiagnostics, will sign off  Please call us if further questions/concerns  Assessment:  - Traumatic Subarachnoid Hemorrhage [facility])  - Delerium due to recent traumatic SAH vs prolonged hospitalization vs sleep deprivation in a pt with mild cognitive    impairment (possibly early dementia) vs worsened kidney function  Plan:  - vEEG showed diffuse slowing, but NO epileptiform discharges over 24+ hr period  - discontinued  - repeat Head-CT: stable  - continue Depakote 500 mg qd at 5pm  - continue Melatonin at bedtime  - pt will benefit from cognitive and balance therapy  - Pt will benefit from improved kidney function   There is a noted difference in  The patient's cognition in relation to kidney fxn  On days with worsened kidney fx, pt had increased confusion with slowed responses  Subjective:   Patient is in soft restraints due to agitation overnight with attempts to pull out IV and lines  Early morning, he was more confused after an accidental dose of Zyprexa administered, repeatedly stating "I can't get my hands out " In the afternoon, his confusion improved, but he was still disoriented to time and place  Patient denies any headaches  ROS:  Negative, except for above  Vitals: Blood pressure 135/76, pulse 71, temperature (!) 96 5 °F (35 8 °C), resp  rate 20, height 5' 9" (1 753 m), weight 72 3 kg (159 lb 6 3 oz), SpO2 95 %  ,Body mass index is 23 54 kg/m²      Physical Exam: /76   Pulse 71   Temp (!) 96 5 °F (35 8 °C)   Resp 20   Ht 5' 9" (1 753 m)   Wt 72 3 kg (159 lb 6 3 oz)   SpO2 95%   BMI 23 54 kg/m²     General Appearance:    Alert, disoriented to place and time   Head:    Normocephalic, without obvious abnormality, atraumatic   Eyes:    PERRL, conjunctiva/corneas clear, EOM's intact    Ears:    Decreased hearing   Neck:   Supple, symmetrical, trachea midline, no adenopathy;        thyroid:  No enlargement/tenderness/nodules; no carotid    bruit or JVD   Lungs:     Clear to auscultation bilaterally, respirations unlabored   Heart:    Regular rate and rhythm, S1 and S2 normal, no murmur, rub   or gallop   Abdomen:     Soft, non-tender, bowel sounds active all four quadrants,     no masses, no organomegaly   Extremities:   Extremities normal, atraumatic, no cyanosis or edema   Pulses:   2+ and symmetric all extremities   Skin:   Skin color, texture, turgor normal, no rashes or lesions   Neurologic:   Mental Status: alert, oriented to person, but not place & time    CN's: intact, except for CN VII with decreased hearing    Sensory: intact throughout to light touch    Motor: 5/5 throughout    Reflexes: 2+ throughout    Speech: slurred, expressive aphasia (improving), pt is able to identify 5 items             Lab, Imaging and other studies:   I have personally reviewed pertinent reports  , CBC:   Results from last 7 days   Lab Units 09/24/19  0648 09/22/19  1606   WBC Thousand/uL 8 41 8 08   RBC Million/uL 4 35 4 42   HEMOGLOBIN g/dL 13 3 13 8   HEMATOCRIT % 39 1 41 0   MCV fL 90 93   PLATELETS Thousands/uL 160 171   , BMP/CMP:   Results from last 7 days   Lab Units 09/27/19  0503 09/26/19  0520 09/25/19  0618   SODIUM mmol/L 138 139 137   POTASSIUM mmol/L 4 0 4 0 3 6   CHLORIDE mmol/L 108 107 108   CO2 mmol/L 24 26 24   BUN mg/dL 35* 34* 29*   CREATININE mg/dL 2 06* 2 02* 1 92*   CALCIUM mg/dL 8 9 8 8 8 6   EGFR ml/min/1 73sq m 27 27 29   , Vitamin B12:   , HgBA1C:   Results from last 7 days   Lab Units 09/23/19  0451   HEMOGLOBIN A1C % 5 8   , TSH:   Results from last 7 days   Lab Units 09/23/19  0451   TSH 3RD GENERATON uIU/mL 2 020   , Coagulation:   Results from last 7 days   Lab Units 09/22/19  1750   INR  1 14   , Lipid Profile:   Results from last 7 days   Lab Units 09/23/19  0451   HDL mg/dL 46   LDL CALC mg/dL 40   TRIGLYCERIDES mg/dL 80   , Ammonia:   Results from last 7 days   Lab Units 09/22/19  1606   AMMONIA umol/L <10*   , Urinalysis:   Results from last 7 days   Lab Units 09/22/19  1751   COLOR UA  Yellow   CLARITY UA  Slightly Cloudy   SPEC GRAV UA  1 010   PH UA  6 0   LEUKOCYTES UA  Negative   NITRITE UA  Negative   GLUCOSE UA mg/dl Negative   KETONES UA mg/dl Negative   BILIRUBIN UA  Negative   BLOOD UA  Moderate*   , Drug Screen:   , Medication Drug Levels:       Invalid input(s): CARBAMAZEPINE,  PHENOBARB, LACOSAMIDE, OXCARBAZEPINE  VTE Prophylaxis: Sequential compression device (Venodyne)     Counseling / Coordination of Care  Total time spent today 45 minutes  Greater than 50% of total time was spent with the patient and / or family counseling and / or coordination of care   A description of the counseling / coordination of care: as stated above

## 2019-09-28 PROBLEM — R47.01 APHASIA: Status: RESOLVED | Noted: 2019-09-23 | Resolved: 2019-09-28

## 2019-09-28 PROBLEM — N18.30 CKD (CHRONIC KIDNEY DISEASE) STAGE 3, GFR 30-59 ML/MIN (HCC): Status: ACTIVE | Noted: 2019-09-28

## 2019-09-28 LAB
ANION GAP SERPL CALCULATED.3IONS-SCNC: -1 MMOL/L (ref 4–13)
ARTERIAL PATENCY WRIST A: YES
BASE EXCESS BLDA CALC-SCNC: -3.5 MMOL/L
BUN SERPL-MCNC: 28 MG/DL (ref 5–25)
CALCIUM SERPL-MCNC: 8.8 MG/DL (ref 8.3–10.1)
CHLORIDE SERPL-SCNC: 109 MMOL/L (ref 100–108)
CO2 SERPL-SCNC: 32 MMOL/L (ref 21–32)
CREAT SERPL-MCNC: 1.83 MG/DL (ref 0.6–1.3)
GFR SERPL CREATININE-BSD FRML MDRD: 31 ML/MIN/1.73SQ M
GLUCOSE SERPL-MCNC: 120 MG/DL (ref 65–140)
GLUCOSE SERPL-MCNC: 90 MG/DL (ref 65–140)
HCO3 BLDA-SCNC: 20.1 MMOL/L (ref 22–28)
NON VENT ROOM AIR: 21 %
O2 CT BLDA-SCNC: 17 ML/DL (ref 16–23)
OXYHGB MFR BLDA: 94.9 % (ref 94–97)
PCO2 BLDA: 31.9 MM HG (ref 36–44)
PH BLDA: 7.42 [PH] (ref 7.35–7.45)
PO2 BLDA: 80.5 MM HG (ref 75–129)
POTASSIUM SERPL-SCNC: 4 MMOL/L (ref 3.5–5.3)
SODIUM SERPL-SCNC: 140 MMOL/L (ref 136–145)
SPECIMEN SOURCE: ABNORMAL

## 2019-09-28 PROCEDURE — 36600 WITHDRAWAL OF ARTERIAL BLOOD: CPT

## 2019-09-28 PROCEDURE — 82948 REAGENT STRIP/BLOOD GLUCOSE: CPT

## 2019-09-28 PROCEDURE — 95951 PR EEG MONITORING/VIDEORECORD: CPT | Performed by: PSYCHIATRY & NEUROLOGY

## 2019-09-28 PROCEDURE — 82805 BLOOD GASES W/O2 SATURATION: CPT | Performed by: INTERNAL MEDICINE

## 2019-09-28 PROCEDURE — 99233 SBSQ HOSP IP/OBS HIGH 50: CPT | Performed by: INTERNAL MEDICINE

## 2019-09-28 PROCEDURE — 80048 BASIC METABOLIC PNL TOTAL CA: CPT | Performed by: INTERNAL MEDICINE

## 2019-09-28 RX ADMIN — NYSTATIN: 100000 POWDER TOPICAL at 17:07

## 2019-09-28 RX ADMIN — DEXTROSE AND SODIUM CHLORIDE 125 ML/HR: 5; .45 INJECTION, SOLUTION INTRAVENOUS at 19:29

## 2019-09-28 RX ADMIN — DEXTROSE AND SODIUM CHLORIDE 75 ML/HR: 5; .45 INJECTION, SOLUTION INTRAVENOUS at 09:46

## 2019-09-28 RX ADMIN — NYSTATIN: 100000 POWDER TOPICAL at 08:45

## 2019-09-28 RX ADMIN — ACETAMINOPHEN 975 MG: 325 TABLET ORAL at 21:09

## 2019-09-28 RX ADMIN — MELATONIN 3 MG: 3 TAB ORAL at 21:10

## 2019-09-28 RX ADMIN — VALPROATE SODIUM 500 MG: 100 INJECTION, SOLUTION INTRAVENOUS at 17:54

## 2019-09-28 RX ADMIN — ACETAMINOPHEN 975 MG: 325 TABLET ORAL at 06:06

## 2019-09-28 NOTE — ASSESSMENT & PLAN NOTE
Patient has some kidneys disease although it is difficult to tell his baseline creatinine due to last labs being several years prior  Will continue as above

## 2019-09-28 NOTE — NURSING NOTE
Pt beginning to desat to 89% and coarse lung sounds  Pt still very lethargic  Only opens eye to pain  Dr Raffaele Yung aware and awaiting new orders

## 2019-09-28 NOTE — PROGRESS NOTES
Progress Note Tiffanie Green 1/30/1924, 80 y o  male MRN: [id number]    Unit/Bed#: [facility] 717-01 Encounter: [id number]    Primary Care Provider: Yuli Fernandez DO   Date and time admitted to hospital: 9/22/2019  6:37 PM        * SAH (subarachnoid hemorrhage) [facility])  Assessment & Plan  Neuro input appreciated  NO AP or AC  MRI and CT head shows stable hemorrhage  Continue PT/OT    Encephalopathy  Assessment & Plan  Geriatric consult, appreciate their recc's  Avoid deliriogenic medications  Maintain circadian rhythm  Encourage hydration  Continue D5 1/2NS  Patient remains lethargic after Zyprexa administration yesterday in the setting FRANCOIS    FRANCOIS (acute kidney injury) ([facility] [address] )  Assessment & Plan  Continue IV hydration  Monitor BUN creatinine  Avoid nephrotoxin agents    Aphasiaresolved as of 9/28/2019  Assessment & Plan  Likely to [facility]  Continue to work with speech therapy  Will need discharge to rehab setting  improved    A-fib ([facility] [address] )  Assessment & Plan  Currently sinus  No rate control medicines  No AC due to bleed    CKD (chronic kidney disease) stage 3, GFR 30-59 ml/min ([facility])  Assessment & Plan  Patient has some kidneys disease although it is difficult to tell his baseline creatinine due to last labs being several years prior  Will continue as above    Coronary artery disease involving native coronary artery of native heart without angina pectoris  Assessment & Plan  S/p remote BMS  Pt on no meds at home    Essential hypertension  Assessment & Plan  Continue Norvasc  Goal SBP < 140  Labetalol prn        VTE Pharmacologic Prophylaxis:   Pharmacologic: Pharmacologic VTE Prophylaxis contraindicated due to [facility]  Mechanical VTE Prophylaxis in Place: Yes    Patient Centered Rounds: I have performed bedside rounds with nursing staff today  Discussions with Specialists or Other Care Team Provider: n/a    Education and Discussions with Family / Patient: patient    Time Spent for Care: 30 minutes    More than 50% of total time spent on counseling and coordination of care as described above  Current Length of Stay: 6 day(s)    Current Patient Status: Inpatient   Certification Statement: The patient will continue to require additional inpatient hospital stay due to awaiting rehab placement and stabilization of FRANCOIS    Discharge Plan: likely d/c to rehab in 1-2 days    Code Status: Level 3 - DNAR and DNI      Subjective:   No acute overnight events  Patient has remained lethargic and sleepy throughout the day  He is able to wake with sternal rub and answers questions but then quickly falls back to sleep  Objective:     Vitals:   Temp (24hrs), Av °F (36 7 °C), Min:97 7 °F (36 5 °C), Max:98 4 °F (36 9 °C)    Temp:  [97 7 °F (36 5 °C)-98 4 °F (36 9 °C)] 97 7 °F (36 5 °C)  HR:  [51-94] 61  Resp:  [18-20] 20  BP: ()/() 99/50  SpO2:  [94 %-98 %] 94 %  Body mass index is 23 44 kg/m²  Input and Output Summary (last 24 hours): Intake/Output Summary (Last 24 hours) at 2019 1728  Last data filed at 2019 1329  Gross per 24 hour   Intake 1111 25 ml   Output 1400 ml   Net -288 75 ml       Physical Exam:     Physical Exam   Constitutional: He appears well-developed and well-nourished  HENT:   Head: Normocephalic and atraumatic  Eyes: Pupils are equal, round, and reactive to light  Conjunctivae are normal    Neck: Neck supple  No JVD present  Cardiovascular: Normal rate, regular rhythm, normal heart sounds and intact distal pulses  Pulmonary/Chest: Effort normal and breath sounds normal    Abdominal: Soft  Bowel sounds are normal    Musculoskeletal: He exhibits no edema  Neurological:   Unable to assess   Skin: Skin is warm and dry  Capillary refill takes less than 2 seconds  Psychiatric:   Unable to assess   Nursing note and vitals reviewed        Additional Data:     Labs:    Results from last 7 days   Lab Units 19  0648   WBC Thousand/uL 8 41   HEMOGLOBIN g/dL 13 3   HEMATOCRIT % 39 1 PLATELETS Thousands/uL 160   NEUTROS PCT % 76*   LYMPHS PCT % 15   MONOS PCT % 6   EOS PCT % 1     Results from last 7 days   Lab Units 09/28/19  0558   SODIUM mmol/L 140   POTASSIUM mmol/L 4 0   CHLORIDE mmol/L 109*   CO2 mmol/L 32   BUN mg/dL 28*   CREATININE mg/dL 1 83*   ANION GAP mmol/L -1*   CALCIUM mg/dL 8 8   GLUCOSE RANDOM mg/dL 90     Results from last 7 days   Lab Units 09/22/19  1750   INR  1 14     Results from last 7 days   Lab Units 09/28/19  1104 09/22/19  1606   POC GLUCOSE mg/dl 120 97     Results from last 7 days   Lab Units 09/23/19  0451   HEMOGLOBIN A1C % 5 8     Results from last 7 days   Lab Units 09/22/19  1606   LACTIC ACID mmol/L 1 3           * I Have Reviewed All Lab Data Listed Above  * Additional Pertinent Lab Tests Reviewed: All Labs Within Last 24 Hours Reviewed    Imaging:    Imaging Reports Reviewed Today Include: n/a  Imaging Personally Reviewed by Myself Includes:  n/a    Recent Cultures (last 7 days):           Last 24 Hours Medication List:     Current Facility-Administered Medications:  acetaminophen 975 mg Oral [facility] [address] Tuesday M MARC Moreno    amLODIPine 5 mg Oral Daily David Michelle DO    dextrose 5 % and sodium chloride 0 45 % 125 mL/hr Intravenous Continuous Mirta Bond MD Last Rate: 125 mL/hr (09/28/19 1308)   divalproex sodium 500 mg Oral QPM Megha Spence MD    Labetalol HCl 5 mg Intravenous Q4H PRN Mirta Bond MD    melatonin 3 mg Oral HS Megha Spence MD    nystatin  Topical BID Nila Jain DO    valproate sodium 500 mg Intravenous Once Mirta Bond MD         Today, Patient Was Seen By: Mirta Bond MD    ** Please Note: Dictation voice to text software may have been used in the creation of this document   **

## 2019-09-28 NOTE — NURSING NOTE
Pt very lethargic, hard to arouse, and won't eat  Dr Rasmussen Lower aware and bedside to see pt  Pt opens eyes to pain

## 2019-09-28 NOTE — ASSESSMENT & PLAN NOTE
Geriatric consult, appreciate their recc's  Avoid deliriogenic medications  Maintain circadian rhythm  Encourage hydration  Continue D5 1/2NS  Patient remains lethargic after Zyprexa administration yesterday in the setting [facility]

## 2019-09-28 NOTE — ASSESSMENT & PLAN NOTE
Likely to [facility]  Continue to work with speech therapy  Will need discharge to rehab setting  improved

## 2019-09-28 NOTE — NURSING NOTE
Pt very lethargic, only opening eyes to pain  Pt also hypotensive, 90/57  Dr Hossein Cruz aware  No new orders obtained at this time

## 2019-09-28 NOTE — PLAN OF CARE
Problem: Prexisting or High Potential for Compromised Skin Integrity  Goal: Skin integrity is maintained or improved  Description  INTERVENTIONS:  - Identify patients at risk for skin breakdown  - Assess and monitor skin integrity  - Assess and monitor nutrition and hydration status  - Monitor labs   - Assess for incontinence   - Turn and reposition patient  - Assist with mobility/ambulation  - Relieve pressure over bony prominences  - Avoid friction and shearing  - Provide appropriate hygiene as needed including keeping skin clean and dry  - Evaluate need for skin moisturizer/barrier cream  - Collaborate with interdisciplinary team   - Patient/family teaching  - Consider wound care consult   Outcome: Progressing     Problem: Neurological Deficit  Goal: Neurological status is stable or improving  Description  Interventions:  - Monitor and assess patient's level of consciousness, motor function, sensory function, and level of assistance needed for ADLs  - Monitor and report changes from baseline  Collaborate with interdisciplinary team to initiate plan and implement interventions as ordered  - Provide and maintain a safe environment  - Consider seizure precautions  - Consider fall precautions  - Consider aspiration precautions  - Consider bleeding precautions  Outcome: Progressing     Problem: Activity Intolerance/Impaired Mobility  Goal: Mobility/activity is maintained at optimum level for patient  Description  Interventions:  - Assess and monitor patient  barriers to mobility and need for assistive/adaptive devices  - Assess patient's emotional response to limitations  - Collaborate with interdisciplinary team and initiate plans and interventions as ordered  - Encourage independent activity per ability   - Maintain proper body alignment  - Perform active/passive rom as tolerated/ordered    - Plan activities to conserve energy   - Turn patient as appropriate  Outcome: Progressing     Problem: Communication Impairment  Goal: Ability to express needs and understand communication  Description  Assess patient's communication skills and ability to understand information  Patient will demonstrate use of effective communication techniques, alternative methods of communication and understanding even if not able to speak  - Encourage communication and provide alternate methods of communication as needed  - Collaborate with case management/ for discharge needs  - Include patient/family/caregiver in decisions related to communication  Outcome: Progressing     Problem: Potential for Aspiration  Goal: Non-ventilated patient's risk of aspiration is minimized  Description  Assess and monitor vital signs, respiratory status, and labs (WBC)  Monitor for signs of aspiration (tachypnea, cough, rales, wheezing, cyanosis, fever)  - Assess and monitor patient's ability to swallow  - Place patient up in chair to eat if possible  - HOB up at 90 degrees to eat if unable to get patient up into chair   - Supervise patient during oral intake  - Instruct patient/ family to take small bites  - Instruct patient/ family to take small single sips when taking liquids  - Follow patient-specific strategies generated by speech pathologist   Outcome: Progressing     Problem: Nutrition  Goal: Nutrition/Hydration status is improving  Description  Monitor and assess patient's nutrition/hydration status for malnutrition (ex- brittle hair, bruises, dry skin, pale skin and conjunctiva, muscle wasting, smooth red tongue, and disorientation)  Collaborate with interdisciplinary team and initiate plan and interventions as ordered  Monitor patient's weight and dietary intake as ordered or per policy  Utilize nutrition screening tool and intervene per policy  Determine patient's food preferences and provide high-protein, high-caloric foods as appropriate       - Assist patient with eating   - Allow adequate time for meals   - Encourage patient to take dietary supplement as ordered  - Collaborate with clinical nutritionist   - Include patient/family/caregiver in decisions related to nutrition  Outcome: Progressing     Problem: NEUROSENSORY - ADULT  Goal: Achieves stable or improved neurological status  Description  INTERVENTIONS  - Monitor and report changes in neurological status  - Monitor vital signs such as temperature, blood pressure, glucose, and any other labs ordered   - Initiate measures to prevent increased intracranial pressure  - Monitor for seizure activity and implement precautions if appropriate      Outcome: Progressing  Goal: Achieves maximal functionality and self care  Description  INTERVENTIONS  - Monitor swallowing and airway patency with patient fatigue and changes in neurological status  - Encourage and assist patient to increase activity and self care     - Encourage visually impaired, hearing impaired and aphasic patients to use assistive/communication devices  Outcome: Progressing     Problem: CARDIOVASCULAR - ADULT  Goal: Maintains optimal cardiac output and hemodynamic stability  Description  INTERVENTIONS:  - Monitor I/O, vital signs and rhythm  - Monitor for S/S and trends of decreased cardiac output  - Administer and titrate ordered vasoactive medications to optimize hemodynamic stability  - Assess quality of pulses, skin color and temperature  - Assess for signs of decreased coronary artery perfusion  - Instruct patient to report change in severity of symptoms  Outcome: Progressing  Goal: Absence of cardiac dysrhythmias or at baseline rhythm  Description  INTERVENTIONS:  - Continuous cardiac monitoring, vital signs, obtain 12 lead EKG if ordered  - Administer antiarrhythmic and heart rate control medications as ordered  - Monitor electrolytes and administer replacement therapy as ordered  Outcome: Progressing     Problem: GASTROINTESTINAL - ADULT  Goal: Maintains or returns to baseline bowel function  Description  INTERVENTIONS:  - Assess bowel function  - Encourage oral fluids to ensure adequate hydration  - Administer IV fluids if ordered to ensure adequate hydration  - Administer ordered medications as needed  - Encourage mobilization and activity  - Consider nutritional services referral to assist patient with adequate nutrition and appropriate food choices  Outcome: Progressing  Goal: Maintains adequate nutritional intake  Description  INTERVENTIONS:  - Monitor percentage of each meal consumed  - Identify factors contributing to decreased intake, treat as appropriate  - Assist with meals as needed  - Monitor I&O, weight, and lab values if indicated  - Obtain nutrition services referral as needed  Outcome: Progressing     Problem: GENITOURINARY - ADULT  Goal: Urinary catheter remains patent  Description  INTERVENTIONS:  - Assess patency of urinary catheter  - If patient has a chronic barrera, consider changing catheter if non-functioning  - Follow guidelines for intermittent irrigation of non-functioning urinary catheter  Outcome: Progressing     Problem: METABOLIC, FLUID AND ELECTROLYTES - ADULT  Goal: Electrolytes maintained within normal limits  Description  INTERVENTIONS:  - Monitor labs and assess patient for signs and symptoms of electrolyte imbalances  - Administer electrolyte replacement as ordered  - Monitor response to electrolyte replacements, including repeat lab results as appropriate  - Instruct patient on fluid and nutrition as appropriate  Outcome: Progressing  Goal: Fluid balance maintained  Description  INTERVENTIONS:  - Monitor labs   - Monitor I/O and WT  - Instruct patient on fluid and nutrition as appropriate  - Assess for signs & symptoms of volume excess or deficit  Outcome: Progressing     Problem: SKIN/TISSUE INTEGRITY - ADULT  Goal: Skin integrity remains intact  Description  INTERVENTIONS  - Identify patients at risk for skin breakdown  - Assess and monitor skin integrity  - Assess and monitor nutrition and hydration status  - Monitor labs (i e  albumin)  - Assess for incontinence   - Turn and reposition patient  - Assist with mobility/ambulation  - Relieve pressure over bony prominences  - Avoid friction and shearing  - Provide appropriate hygiene as needed including keeping skin clean and dry  - Evaluate need for skin moisturizer/barrier cream  - Collaborate with interdisciplinary team (i e  Nutrition, Rehabilitation, etc )   - Patient/family teaching  Outcome: Progressing     Problem: HEMATOLOGIC - ADULT  Goal: Maintains hematologic stability  Description  INTERVENTIONS  - Assess for signs and symptoms of bleeding or hemorrhage  - Monitor labs  - Administer supportive blood products/factors as ordered and appropriate  Outcome: Progressing     Problem: MUSCULOSKELETAL - ADULT  Goal: Maintain or return mobility to safest level of function  Description  INTERVENTIONS:  - Assess patient's ability to carry out ADLs; assess patient's baseline for ADL function and identify physical deficits which impact ability to perform ADLs (bathing, care of mouth/teeth, toileting, grooming, dressing, etc )  - Assess/evaluate cause of self-care deficits   - Assess range of motion  - Assess patient's mobility  - Assess patient's need for assistive devices and provide as appropriate  - Encourage maximum independence but intervene and supervise when necessary  - Involve family in performance of ADLs  - Assess for home care needs following discharge   - Consider OT consult to assist with ADL evaluation and planning for discharge  - Provide patient education as appropriate  Outcome: Progressing     Problem: PAIN - ADULT  Goal: Verbalizes/displays adequate comfort level or baseline comfort level  Description  Interventions:  - Encourage patient to monitor pain and request assistance  - Assess pain using appropriate pain scale  - Administer analgesics based on type and severity of pain and evaluate response  - Implement non-pharmacological measures as appropriate and evaluate response  - Consider cultural and social influences on pain and pain management  - Notify physician/advanced practitioner if interventions unsuccessful or patient reports new pain  Outcome: Progressing     Problem: INFECTION - ADULT  Goal: Absence or prevention of progression during hospitalization  Description  INTERVENTIONS:  - Assess and monitor for signs and symptoms of infection  - Monitor lab/diagnostic results  - Monitor all insertion sites, i e  indwelling lines, tubes, and drains  - [place] appropriate cooling/warming therapies per order  - Administer medications as ordered  - Instruct and encourage patient and family to use good hand hygiene technique  - Identify and instruct in appropriate isolation precautions for identified infection/condition   Outcome: Progressing     Problem: SAFETY ADULT  Goal: Patient will remain free of falls  Description  INTERVENTIONS:  - Assess patient frequently for physical needs  -  Identify cognitive and physical deficits and behaviors that affect risk of falls    -  [place] fall precautions as indicated by assessment   - Educate patient/family on patient safety including physical limitations  - Instruct patient to call for assistance with activity based on assessment  - Modify environment to reduce risk of injury  - Consider OT/PT consult to assist with strengthening/mobility  Outcome: Progressing  Goal: Maintain or return to baseline ADL function  Description  INTERVENTIONS:  -  Assess patient's ability to carry out ADLs; assess patient's baseline for ADL function and identify physical deficits which impact ability to perform ADLs (bathing, care of mouth/teeth, toileting, grooming, dressing, etc )  - Assess/evaluate cause of self-care deficits   - Assess range of motion  - Assess patient's mobility; develop plan if impaired  - Assess patient's need for assistive devices and provide as appropriate  - Encourage maximum independence but intervene and supervise when necessary  - Involve family in performance of ADLs  - Assess for home care needs following discharge   - Consider OT consult to assist with ADL evaluation and planning for discharge  - Provide patient education as appropriate  Outcome: Progressing  Goal: Maintain or return mobility status to optimal level  Description  INTERVENTIONS:  - Assess patient's baseline mobility status (ambulation, transfers, stairs, etc )    - Identify cognitive and physical deficits and behaviors that affect mobility  - Identify mobility aids required to assist with transfers and/or ambulation (gait belt, sit-to-stand, lift, walker, cane, etc )  - [place] fall precautions as indicated by assessment  - Record patient progress and toleration of activity level on Mobility SBAR; progress patient to next Phase/Stage  - Instruct patient to call for assistance with activity based on assessment  - Consider rehabilitation consult to assist with strengthening/weightbearing, etc   Outcome: Progressing     Problem: DISCHARGE PLANNING  Goal: Discharge to home or other facility with appropriate resources  Description  INTERVENTIONS:  - Identify barriers to discharge w/patient and caregiver  - Arrange for needed discharge resources and transportation as appropriate  - Identify discharge learning needs (meds, wound care, etc )  - Arrange for interpretive services to assist at discharge as needed  - Refer to Case Management Department for coordinating discharge planning if the patient needs post-hospital services based on physician/advanced practitioner order or complex needs related to functional status, cognitive ability, or social support system  Outcome: Progressing     Problem: Knowledge Deficit  Goal: Patient/family/caregiver demonstrates understanding of disease process, treatment plan, medications, and discharge instructions  Description  Complete learning assessment and assess knowledge base  Interventions:  - Provide teaching at level of understanding  - Provide teaching via preferred learning methods  Outcome: Progressing     Problem: Potential for Falls  Goal: Patient will remain free of falls  Description  INTERVENTIONS:  - Assess patient frequently for physical needs  -  Identify cognitive and physical deficits and behaviors that affect risk of falls  -  [place] fall precautions as indicated by assessment   - Educate patient/family on patient safety including physical limitations  - Instruct patient to call for assistance with activity based on assessment  - Modify environment to reduce risk of injury  - Consider OT/PT consult to assist with strengthening/mobility  Outcome: Progressing     Problem: Nutrition/Hydration-ADULT  Goal: Nutrient/Hydration intake appropriate for improving, restoring or maintaining nutritional needs  Description  Monitor and assess patient's nutrition/hydration status for malnutrition  Collaborate with interdisciplinary team and initiate plan and interventions as ordered  Monitor patient's weight and dietary intake as ordered or per policy  Utilize nutrition screening tool and intervene as necessary  Determine patient's food preferences and provide high-protein, high-caloric foods as appropriate       INTERVENTIONS:  - Monitor oral intake, urinary output, labs, and treatment plans  - Assess nutrition and hydration status and recommend course of action  - Evaluate amount of meals eaten  - Assist patient with eating if necessary   - Allow adequate time for meals  - Recommend/ encourage appropriate diets, oral nutritional supplements, and vitamin/mineral supplements  - Order, calculate, and assess calorie counts as needed  - Recommend, monitor, and adjust tube feedings and TPN/PPN based on assessed needs  - Assess need for intravenous fluids  - Provide specific nutrition/hydration education as appropriate  - Include patient/family/caregiver in decisions related to nutrition  Outcome: Progressing     Problem: CONFUSION/THOUGHT DISTURBANCE  Goal: Thought disturbances (confusion, delirium, depression, dementia or psychosis) are managed to maintain or return to baseline mental status and functional level  Description  INTERVENTIONS:  - Assess for possible contributors to  thought disturbance, including but not limited to medications, infection, impaired vision or hearing, underlying metabolic abnormalities, dehydration, respiratory compromise,  psychiatric diagnoses and notify attending PHYSICAN/AP  - Monitor and intervene to maintain adequate nutrition, hydration, elimination, sleep and activity  - Decrease environmental stimuli, including noise as appropriate  - Provide frequent contacts to provide refocusing, direction and reassurance as needed  Approach patient calmly with eye contact and at their level  - [place] high risk fall precautions, aspiration precautions and other safety measures, as indicated  - If delirium suspected, notify physician/AP of change in condition and request immediate in-person evaluation  - Pursue consults as appropriate including Geriatric (campus dependent), OT for cognitive evaluation/activity planning, psychiatric, pastoral care, etc   Outcome: Progressing     Problem: BEHAVIOR  Goal: Pt/Family maintain appropriate behavior and adhere to behavioral management agreement, if implemented  Description  INTERVENTIONS:  - Assess the family dynamic   - Encourage verbalization of thoughts and concerns in a socially appropriate manner  - Assess patient/family's coping skills and non-compliant behavior (including use of illegal substances)    - Utilize positive, consistent limit setting strategies supporting safety of patient, staff and others  - Initiate consult with Case Management, Spiritual Care or other ancillary services as appropriate  - If a patient's/visitor's behavior jeopardizes the safety of the patient, staff, or others, refer to organization procedure  - Notify Security of behavior or suspected illegal substances which indicate the need for search of the patient and/or belongings  - Encourage participation in the decision making process about a behavioral management agreement; implement if patient meets criteria  Outcome: Progressing     Problem: SELF HARM  Goal: Effect of psychiatric condition will be minimized and patient will be protected from self harm  Description  INTERVENTIONS:  - Assess impact of patient's symptoms on level of functioning, self-care needs and offer support as indicated  - Assess patient/family knowledge of depression, impact on illness and need for teaching  - Provide emotional support, presence and reassurance  - Assess for possible suicidal thoughts, ideation or self-harm  If patient expresses suicidal thoughts or statements do not leave alone, notify physician/AP immediately, initiate suicide precautions, and determine need for continual observation    - initiate consults and referrals as appropriate (a mental health professional, Spiritual Care  Outcome: Progressing     Problem: SAFETY,RESTRAINT: NV/NON-SELF DESTRUCTIVE BEHAVIOR  Goal: Remains free of harm/injury (restraint for non violent/non self-detsructive behavior)  Description  INTERVENTIONS:  - Instruct patient/family regarding restraint use   - Assess and monitor physiologic and psychological status   - Provide interventions and comfort measures to meet assessed patient needs   - Identify and implement measures to help patient regain control  - Assess readiness for release of restraint   Outcome: Progressing  Goal: Returns to optimal restraint-free functioning  Description  INTERVENTIONS:  - Assess the patient's behavior and symptoms that indicate continued need for restraint  - Identify and implement measures to help patient regain control  - Assess readiness for release of restraint   Outcome: Progressing

## 2019-09-28 NOTE — NURSING NOTE
Pt still remaining lethargic, only responding to pain  Pt not able to eat or drink anything  Dr Senia Alexandra aware and bedside to evaluate pt

## 2019-09-29 ENCOUNTER — TELEPHONE (OUTPATIENT)
Dept: UROLOGY | Facility: CLINIC | Age: 84
End: 2019-09-29

## 2019-09-29 PROBLEM — R33.9 URINARY RETENTION: Status: ACTIVE | Noted: 2019-09-29

## 2019-09-29 LAB
ANION GAP SERPL CALCULATED.3IONS-SCNC: 6 MMOL/L (ref 4–13)
BUN SERPL-MCNC: 36 MG/DL (ref 5–25)
CALCIUM SERPL-MCNC: 8.7 MG/DL (ref 8.3–10.1)
CHLORIDE SERPL-SCNC: 109 MMOL/L (ref 100–108)
CO2 SERPL-SCNC: 23 MMOL/L (ref 21–32)
CREAT SERPL-MCNC: 1.79 MG/DL (ref 0.6–1.3)
GFR SERPL CREATININE-BSD FRML MDRD: 32 ML/MIN/1.73SQ M
GLUCOSE SERPL-MCNC: 102 MG/DL (ref 65–140)
POTASSIUM SERPL-SCNC: 3.6 MMOL/L (ref 3.5–5.3)
SODIUM SERPL-SCNC: 138 MMOL/L (ref 136–145)

## 2019-09-29 PROCEDURE — 99222 1ST HOSP IP/OBS MODERATE 55: CPT | Performed by: UROLOGY

## 2019-09-29 PROCEDURE — 80048 BASIC METABOLIC PNL TOTAL CA: CPT | Performed by: INTERNAL MEDICINE

## 2019-09-29 PROCEDURE — 99233 SBSQ HOSP IP/OBS HIGH 50: CPT | Performed by: INTERNAL MEDICINE

## 2019-09-29 RX ADMIN — DEXTROSE AND SODIUM CHLORIDE 125 ML/HR: 5; .45 INJECTION, SOLUTION INTRAVENOUS at 12:34

## 2019-09-29 RX ADMIN — ACETAMINOPHEN 975 MG: 325 TABLET ORAL at 05:32

## 2019-09-29 RX ADMIN — DIVALPROEX SODIUM 500 MG: 500 TABLET, DELAYED RELEASE ORAL at 17:16

## 2019-09-29 RX ADMIN — NYSTATIN: 100000 POWDER TOPICAL at 08:41

## 2019-09-29 RX ADMIN — DEXTROSE AND SODIUM CHLORIDE 125 ML/HR: 5; .45 INJECTION, SOLUTION INTRAVENOUS at 03:55

## 2019-09-29 RX ADMIN — NYSTATIN: 100000 POWDER TOPICAL at 17:17

## 2019-09-29 RX ADMIN — DEXTROSE AND SODIUM CHLORIDE 125 ML/HR: 5; .45 INJECTION, SOLUTION INTRAVENOUS at 20:43

## 2019-09-29 RX ADMIN — MELATONIN 3 MG: 3 TAB ORAL at 21:40

## 2019-09-29 NOTE — SOCIAL WORK
CM met w/pt, son and family members @ bedside re: backup SNF choices   CM made additional SNF referrals to [address] [place] [address] per son's request

## 2019-09-29 NOTE — PROGRESS NOTES
Progress Note Jaden Jean 1/30/1924, 80 y o  male MRN: [id number]    Unit/Bed#: [facility] 717-01 Encounter: [id number]    Primary Care Provider: Lesly Montes DO   Date and time admitted to hospital: 9/22/2019  6:37 PM        * SAH (subarachnoid hemorrhage) [facility])  Assessment & Plan  Neuro input appreciated  NO AP or AC  MRI and CT head shows stable hemorrhage  Continue PT/OT    Encephalopathy  Assessment & Plan  Geriatric consult, appreciate their recc's  Avoid deliriogenic medications  Maintain circadian rhythm  Encourage hydration  Continue D5 1/2NS  Patient's mentation improving this morning he is able to recognize family members names    FRANCOIS (acute kidney injury) ([facility] [address] )  Assessment & Plan  Continue IV hydration  Monitor BUN creatinine  Avoid nephrotoxin agents  Improving    A-fib ([facility] [address] )  Assessment & Plan  Currently sinus  No rate control medicines  No AC due to bleed    Urinary retention  Assessment & Plan  The patient has had Hoffmann catheter placed twice as he has failed voiding trials  Urology consulted, appreciate their recommendations    CKD (chronic kidney disease) stage 3, GFR 30-59 ml/min ([facility] [address] )  Assessment & Plan  Patient has some kidneys disease although it is difficult to tell his baseline creatinine due to last labs being several years prior  Will continue as above    Coronary artery disease involving native coronary artery of native heart without angina pectoris  Assessment & Plan  S/p remote BMS  Pt on no meds at home    Essential hypertension  Assessment & Plan  Continue Norvasc  Goal SBP < 140  Labetalol prn      VTE Pharmacologic Prophylaxis:   Pharmacologic: Pharmacologic VTE Prophylaxis contraindicated due to [facility]  Mechanical VTE Prophylaxis in Place: Yes    Patient Centered Rounds: I have performed bedside rounds with nursing staff today      Discussions with Specialists or Other Care Team Provider: no    Education and Discussions with Family / Patient: patient and son     Time Spent for Care: 27 minutes  More than 50% of total time spent on counseling and coordination of care as described above  Current Length of Stay: 7 day(s)    Current Patient Status: Inpatient   Certification Statement: The patient will continue to require additional inpatient hospital stay due to pending rehab placement    Discharge Plan: to rehab when accepted    Code Status: Level 3 - DNAR and DNI      Subjective:   No acute overnight events  Patient remains lethargic this morning but his pCO2 around  He is able to recognize his family members name and is engaging more in conversation  He denies any chest pain, shortness of breath, lightheadedness, nausea  Objective:     Vitals:   Temp (24hrs), Av °F (36 7 °C), Min:97 7 °F (36 5 °C), Max:98 1 °F (36 7 °C)    Temp:  [97 7 °F (36 5 °C)-98 1 °F (36 7 °C)] 98 1 °F (36 7 °C)  HR:  [51-70] 51  Resp:  [18-20] 18  BP: ()/(50-73) 120/70  SpO2:  [94 %-98 %] 98 %  Body mass index is 23 57 kg/m²  Input and Output Summary (last 24 hours): Intake/Output Summary (Last 24 hours) at 2019 1433  Last data filed at 2019 1234  Gross per 24 hour   Intake 3735 42 ml   Output 200 ml   Net 3535 42 ml       Physical Exam:     Physical Exam   Constitutional: He appears well-developed and well-nourished  HENT:   Head: Normocephalic and atraumatic  Mouth/Throat: Oropharynx is clear and moist    Eyes: Pupils are equal, round, and reactive to light  Conjunctivae are normal    Neck: Neck supple  No JVD present  Cardiovascular: Normal rate, regular rhythm, normal heart sounds and intact distal pulses  Pulmonary/Chest: Effort normal and breath sounds normal    Abdominal: Soft  Bowel sounds are normal    Musculoskeletal: He exhibits no edema  Neurological: He is alert  Oriented to person   Skin: Skin is warm and dry  Capillary refill takes less than 2 seconds  Psychiatric:   Unable to assess   Nursing note and vitals reviewed          Additional Data: Labs:    Results from last 7 days   Lab Units 09/24/19  0648   WBC Thousand/uL 8 41   HEMOGLOBIN g/dL 13 3   HEMATOCRIT % 39 1   PLATELETS Thousands/uL 160   NEUTROS PCT % 76*   LYMPHS PCT % 15   MONOS PCT % 6   EOS PCT % 1     Results from last 7 days   Lab Units 09/29/19  0532   SODIUM mmol/L 138   POTASSIUM mmol/L 3 6   CHLORIDE mmol/L 109*   CO2 mmol/L 23   BUN mg/dL 36*   CREATININE mg/dL 1 79*   ANION GAP mmol/L 6   CALCIUM mg/dL 8 7   GLUCOSE RANDOM mg/dL 102     Results from last 7 days   Lab Units 09/22/19  1750   INR  1 14     Results from last 7 days   Lab Units 09/28/19  1104 09/22/19  1606   POC GLUCOSE mg/dl 120 97     Results from last 7 days   Lab Units 09/23/19  0451   HEMOGLOBIN A1C % 5 8     Results from last 7 days   Lab Units 09/22/19  1606   LACTIC ACID mmol/L 1 3           * I Have Reviewed All Lab Data Listed Above  * Additional Pertinent Lab Tests Reviewed: Trevor 66 Admission Reviewed    Imaging:    Imaging Reports Reviewed Today Include: n/a  Imaging Personally Reviewed by Myself Includes:  n/a    Recent Cultures (last 7 days):           Last 24 Hours Medication List:     Current Facility-Administered Medications:  acetaminophen 975 mg Oral [facility] Tuesday M MARC Moreno    amLODIPine 5 mg Oral Daily Samuel Blandon,     dextrose 5 % and sodium chloride 0 45 % 125 mL/hr Intravenous Continuous Gianni Banegas MD Last Rate: 125 mL/hr (09/29/19 1234)   divalproex sodium 500 mg Oral QPM Cassy Rider MD    Labetalol HCl 5 mg Intravenous Q4H PRN Gianni Banegas MD    melatonin 3 mg Oral HS Cassy Rider MD    nystatin  Topical BID Bouchra Marina, DO         Today, Patient Was Seen By: Gianni Banegas MD    ** Please Note: Dictation voice to text software may have been used in the creation of this document   **

## 2019-09-29 NOTE — TELEPHONE ENCOUNTER
Patient will need follow-up in the next 4-6 weeks to determine utility of chronic catheterization and exchange urethral catheter, or arrange for suprapubic placement  Family will likely need to be present to assist with decision making

## 2019-09-29 NOTE — CONSULTS
UROLOGY CONSULTATION NOTE     Patient Identifiers: Soni Claire (MRN [id number])  Service Requesting Consultation: SLIM  Service Providing Consultation:  Urology, Susan He MD    Date of Service: 9/29/2019    Reason for Consultation: Retention      ASSESSMENT:     41-year-old male with encephalopathy and subarachnoid hemorrhage with persistent urinary retention    PLAN:     Given patient's encephalopathy and subarachnoid hemorrhage, would not recommend attempts at trial of void unless the patient has significant recovery from a neurologic standpoint  Outpatient follow-up can be arranged and discussion with family can determine utility of management of the patient's bladder outlet and either trial of void, conversion to suprapubic tube or long-term urethral bladder drainage  Given the fall risk, would avoid alpha blockers like Flomax  Outpatient Urologic appointment will be made for the patient  No additional urologic in patient needs  Please call with questions  Thank you for allowing me to participate in this patients care  Please do not hesitate to call with any additional questions  Susan He MD    History of Present Illness:     Soni Claire is a 80 y o  old presenting with aphasia and stroke alert  Patient has been encephalopathic after diagnosis of a subarachnoid hemorrhage  He has intermittent urinary retention and catheter has been removed and replaced on several occasions this hospital stay  Past Medical, Past Surgical History:     Past Medical History:   Diagnosis Date    Arthritis     Athscl heart disease of native coronary artery w/o ang pctrs     Coronary angioplasty status     HTN (hypertension)     Hx of echocardiogram 04/06/2012    EF 0 65, Normal LV function      Hyperlipidemia     Right bundle branch block     Ventricular premature depolarization    :    Past Surgical History:   Procedure Laterality Date    CARDIAC CATHETERIZATION  04/04/2012 Single vessel CAD; Successful bare metal stent placed in the distal RCA   :    Medications, Allergies:     Current Facility-Administered Medications:     acetaminophen (TYLENOL) tablet 975 mg, 975 mg, Oral, Q8H [address], Tuesday M MARC Moreno, 975 mg at 09/29/19 0532    amLODIPine (NORVASC) tablet 5 mg, 5 mg, Oral, Daily, Paitence Norwood DO, 5 mg at 09/27/19 1137    dextrose 5 % and sodium chloride 0 45 % infusion, 125 mL/hr, Intravenous, Continuous, Estefania Hidalgo MD, Last Rate: 125 mL/hr at 09/29/19 0355, 125 mL/hr at 09/29/19 0355    divalproex sodium (DEPAKOTE) EC tablet 500 mg, 500 mg, Oral, QPM, Paramjit Ghotra MD, Stopped at 09/28/19 1709    Labetalol HCl (NORMODYNE) injection 5 mg, 5 mg, Intravenous, Q4H PRN, Estefania Hidalgo MD    melatonin tablet 3 mg, 3 mg, Oral, HS, Paramjit Ghotra MD, 3 mg at 09/28/19 2110    nystatin (MYCOSTATIN) powder, , Topical, BID, Elgin Conde DO    Allergies:  No Known Allergies:    Social and Family History:   Social History:   Social History     Tobacco Use    Smoking status: Never Smoker    Smokeless tobacco: Never Used   Substance Use Topics    Alcohol use: Not Currently    Drug use: Never     Social History     Tobacco Use   Smoking Status Never Smoker   Smokeless Tobacco Never Used       Family History:  Family History   Problem Relation Age of Onset    No Known Problems Family         noncontributory   :     Review of Systems:     Not able to obtain, patient encephalopathic    Physical Exam:   General: Patient is sleeping and not easily arousable  /70   Pulse (!) 51   Temp 98 1 °F (36 7 °C)   Resp 18   Ht 5' 9" (1 753 m)   Wt 72 4 kg (159 lb 9 8 oz)   SpO2 98%   BMI 23 57 kg/m²   Cardiac: Peripheral edema: negative  Pulmonary: Non-labored breathing  Abdomen: Soft, non-tender, non-distended  No surgical scars  No masses, tenderness, hernias noted  Genitourinary: Negative CVA tenderness, negative suprapubic tenderness      (Male): Penis circumcised, phallus normal, meatus patent  Testicles descended into scrotum bilaterally without masses nor tenderness  No inguinal hernias bilaterally        NELSON: in place draining clear yellow urine     Labs:     Lab Results   Component Value Date    HGB 13 3 09/24/2019    HCT 39 1 09/24/2019    WBC 8 41 09/24/2019     09/24/2019   ]    Lab Results   Component Value Date     12/02/2014    K 3 6 09/29/2019     (H) 09/29/2019    CO2 23 09/29/2019    BUN 36 (H) 09/29/2019    CREATININE 1 79 (H) 09/29/2019    CALCIUM 8 7 09/29/2019    GLUCOSE 107 12/02/2014 9/22/19 1751    RBC, UA None Seen, 0-5 /hpf 20-30Abnormal     WBC, UA None Seen, 0-5, 5-55, 5-65 /hpf 1-2Abnormal     Epithelial Cells None Seen, Occasional /hpf Occasional    Bacteria, UA None Seen, Occasional /hpf Occasional          Specimen Collected: 09/22/19         Imaging:   I personally reviewed the images and report of the following studies, and reviewed them with the patient:    No recent  imaging

## 2019-09-29 NOTE — ASSESSMENT & PLAN NOTE
The patient has had Hoffmann catheter placed twice as he has failed voiding trials  Urology consulted, appreciate their recommendations

## 2019-09-30 PROCEDURE — 99233 SBSQ HOSP IP/OBS HIGH 50: CPT | Performed by: INTERNAL MEDICINE

## 2019-09-30 RX ADMIN — AMLODIPINE BESYLATE 5 MG: 5 TABLET ORAL at 09:06

## 2019-09-30 RX ADMIN — MELATONIN 3 MG: 3 TAB ORAL at 21:48

## 2019-09-30 RX ADMIN — ACETAMINOPHEN 975 MG: 325 TABLET ORAL at 21:48

## 2019-09-30 RX ADMIN — DIVALPROEX SODIUM 500 MG: 500 TABLET, DELAYED RELEASE ORAL at 17:54

## 2019-09-30 RX ADMIN — NYSTATIN 1 APPLICATION: 100000 POWDER TOPICAL at 09:06

## 2019-09-30 RX ADMIN — NYSTATIN 1 APPLICATION: 100000 POWDER TOPICAL at 17:55

## 2019-09-30 RX ADMIN — DEXTROSE AND SODIUM CHLORIDE 125 ML/HR: 5; .45 INJECTION, SOLUTION INTRAVENOUS at 04:01

## 2019-09-30 NOTE — ASSESSMENT & PLAN NOTE
Neuro input appreciated  NO AP or AC  MRI and CT head shows stable hemorrhage  Continue PT/OT  Fall precaution

## 2019-09-30 NOTE — PLAN OF CARE
Problem: Prexisting or High Potential for Compromised Skin Integrity  Goal: Skin integrity is maintained or improved  Description  INTERVENTIONS:  - Identify patients at risk for skin breakdown  - Assess and monitor skin integrity  - Assess and monitor nutrition and hydration status  - Monitor labs   - Assess for incontinence   - Turn and reposition patient  - Assist with mobility/ambulation  - Relieve pressure over bony prominences  - Avoid friction and shearing  - Provide appropriate hygiene as needed including keeping skin clean and dry  - Evaluate need for skin moisturizer/barrier cream  - Collaborate with interdisciplinary team   - Patient/family teaching  - Consider wound care consult   Outcome: Progressing     Problem: Neurological Deficit  Goal: Neurological status is stable or improving  Description  Interventions:  - Monitor and assess patient's level of consciousness, motor function, sensory function, and level of assistance needed for ADLs  - Monitor and report changes from baseline  Collaborate with interdisciplinary team to initiate plan and implement interventions as ordered  - Provide and maintain a safe environment  - Consider seizure precautions  - Consider fall precautions  - Consider aspiration precautions  - Consider bleeding precautions  Outcome: Progressing     Problem: Activity Intolerance/Impaired Mobility  Goal: Mobility/activity is maintained at optimum level for patient  Description  Interventions:  - Assess and monitor patient  barriers to mobility and need for assistive/adaptive devices  - Assess patient's emotional response to limitations  - Collaborate with interdisciplinary team and initiate plans and interventions as ordered  - Encourage independent activity per ability   - Maintain proper body alignment  - Perform active/passive rom as tolerated/ordered    - Plan activities to conserve energy   - Turn patient as appropriate  Outcome: Progressing     Problem: Potential for Aspiration  Goal: Non-ventilated patient's risk of aspiration is minimized  Description  Assess and monitor vital signs, respiratory status, and labs (WBC)  Monitor for signs of aspiration (tachypnea, cough, rales, wheezing, cyanosis, fever)  - Assess and monitor patient's ability to swallow  - Place patient up in chair to eat if possible  - HOB up at 90 degrees to eat if unable to get patient up into chair   - Supervise patient during oral intake  - Instruct patient/ family to take small bites  - Instruct patient/ family to take small single sips when taking liquids  - Follow patient-specific strategies generated by speech pathologist   Outcome: Progressing     Problem: Nutrition  Goal: Nutrition/Hydration status is improving  Description  Monitor and assess patient's nutrition/hydration status for malnutrition (ex- brittle hair, bruises, dry skin, pale skin and conjunctiva, muscle wasting, smooth red tongue, and disorientation)  Collaborate with interdisciplinary team and initiate plan and interventions as ordered  Monitor patient's weight and dietary intake as ordered or per policy  Utilize nutrition screening tool and intervene per policy  Determine patient's food preferences and provide high-protein, high-caloric foods as appropriate  - Assist patient with eating   - Allow adequate time for meals   - Encourage patient to take dietary supplement as ordered  - Collaborate with clinical nutritionist   - Include patient/family/caregiver in decisions related to nutrition    Outcome: Progressing     Problem: NEUROSENSORY - ADULT  Goal: Achieves stable or improved neurological status  Description  INTERVENTIONS  - Monitor and report changes in neurological status  - Monitor vital signs such as temperature, blood pressure, glucose, and any other labs ordered   - Initiate measures to prevent increased intracranial pressure  - Monitor for seizure activity and implement precautions if appropriate Outcome: Progressing  Goal: Achieves maximal functionality and self care  Description  INTERVENTIONS  - Monitor swallowing and airway patency with patient fatigue and changes in neurological status  - Encourage and assist patient to increase activity and self care     - Encourage visually impaired, hearing impaired and aphasic patients to use assistive/communication devices  Outcome: Progressing     Problem: GENITOURINARY - ADULT  Goal: Urinary catheter remains patent  Description  INTERVENTIONS:  - Assess patency of urinary catheter  - If patient has a chronic barrera, consider changing catheter if non-functioning  - Follow guidelines for intermittent irrigation of non-functioning urinary catheter  Outcome: Progressing     Problem: METABOLIC, FLUID AND ELECTROLYTES - ADULT  Goal: Electrolytes maintained within normal limits  Description  INTERVENTIONS:  - Monitor labs and assess patient for signs and symptoms of electrolyte imbalances  - Administer electrolyte replacement as ordered  - Monitor response to electrolyte replacements, including repeat lab results as appropriate  - Instruct patient on fluid and nutrition as appropriate  Outcome: Progressing  Goal: Fluid balance maintained  Description  INTERVENTIONS:  - Monitor labs   - Monitor I/O and WT  - Instruct patient on fluid and nutrition as appropriate  - Assess for signs & symptoms of volume excess or deficit  Outcome: Progressing     Problem: SKIN/TISSUE INTEGRITY - ADULT  Goal: Skin integrity remains intact  Description  INTERVENTIONS  - Identify patients at risk for skin breakdown  - Assess and monitor skin integrity  - Assess and monitor nutrition and hydration status  - Monitor labs (i e  albumin)  - Assess for incontinence   - Turn and reposition patient  - Assist with mobility/ambulation  - Relieve pressure over bony prominences  - Avoid friction and shearing  - Provide appropriate hygiene as needed including keeping skin clean and dry  - Evaluate need for skin moisturizer/barrier cream  - Collaborate with interdisciplinary team (i e  Nutrition, Rehabilitation, etc )   - Patient/family teaching  Outcome: Progressing     Problem: MUSCULOSKELETAL - ADULT  Goal: Maintain or return mobility to safest level of function  Description  INTERVENTIONS:  - Assess patient's ability to carry out ADLs; assess patient's baseline for ADL function and identify physical deficits which impact ability to perform ADLs (bathing, care of mouth/teeth, toileting, grooming, dressing, etc )  - Assess/evaluate cause of self-care deficits   - Assess range of motion  - Assess patient's mobility  - Assess patient's need for assistive devices and provide as appropriate  - Encourage maximum independence but intervene and supervise when necessary  - Involve family in performance of ADLs  - Assess for home care needs following discharge   - Consider OT consult to assist with ADL evaluation and planning for discharge  - Provide patient education as appropriate  Outcome: Progressing     Problem: PAIN - ADULT  Goal: Verbalizes/displays adequate comfort level or baseline comfort level  Description  Interventions:  - Encourage patient to monitor pain and request assistance  - Assess pain using appropriate pain scale  - Administer analgesics based on type and severity of pain and evaluate response  - Implement non-pharmacological measures as appropriate and evaluate response  - Consider cultural and social influences on pain and pain management  - Notify physician/advanced practitioner if interventions unsuccessful or patient reports new pain  Outcome: Progressing     Problem: INFECTION - ADULT  Goal: Absence or prevention of progression during hospitalization  Description  INTERVENTIONS:  - Assess and monitor for signs and symptoms of infection  - Monitor lab/diagnostic results  - Monitor all insertion sites, i e  indwelling lines, tubes, and drains  - [place] appropriate cooling/warming therapies per order  - Administer medications as ordered  - Instruct and encourage patient and family to use good hand hygiene technique  - Identify and instruct in appropriate isolation precautions for identified infection/condition   Outcome: Progressing     Problem: SAFETY ADULT  Goal: Patient will remain free of falls  Description  INTERVENTIONS:  - Assess patient frequently for physical needs  -  Identify cognitive and physical deficits and behaviors that affect risk of falls    -  [place] fall precautions as indicated by assessment   - Educate patient/family on patient safety including physical limitations  - Instruct patient to call for assistance with activity based on assessment  - Modify environment to reduce risk of injury  - Consider OT/PT consult to assist with strengthening/mobility  Outcome: Progressing  Goal: Maintain or return to baseline ADL function  Description  INTERVENTIONS:  -  Assess patient's ability to carry out ADLs; assess patient's baseline for ADL function and identify physical deficits which impact ability to perform ADLs (bathing, care of mouth/teeth, toileting, grooming, dressing, etc )  - Assess/evaluate cause of self-care deficits   - Assess range of motion  - Assess patient's mobility; develop plan if impaired  - Assess patient's need for assistive devices and provide as appropriate  - Encourage maximum independence but intervene and supervise when necessary  - Involve family in performance of ADLs  - Assess for home care needs following discharge   - Consider OT consult to assist with ADL evaluation and planning for discharge  - Provide patient education as appropriate  Outcome: Progressing  Goal: Maintain or return mobility status to optimal level  Description  INTERVENTIONS:  - Assess patient's baseline mobility status (ambulation, transfers, stairs, etc )    - Identify cognitive and physical deficits and behaviors that affect mobility  - Identify mobility aids required to assist with transfers and/or ambulation (gait belt, sit-to-stand, lift, walker, cane, etc )  - [place] fall precautions as indicated by assessment  - Record patient progress and toleration of activity level on Mobility SBAR; progress patient to next Phase/Stage  - Instruct patient to call for assistance with activity based on assessment  - Consider rehabilitation consult to assist with strengthening/weightbearing, etc   Outcome: Progressing     Problem: DISCHARGE PLANNING  Goal: Discharge to home or other facility with appropriate resources  Description  INTERVENTIONS:  - Identify barriers to discharge w/patient and caregiver  - Arrange for needed discharge resources and transportation as appropriate  - Identify discharge learning needs (meds, wound care, etc )  - Arrange for interpretive services to assist at discharge as needed  - Refer to Case Management Department for coordinating discharge planning if the patient needs post-hospital services based on physician/advanced practitioner order or complex needs related to functional status, cognitive ability, or social support system  Outcome: Progressing     Problem: Knowledge Deficit  Goal: Patient/family/caregiver demonstrates understanding of disease process, treatment plan, medications, and discharge instructions  Description  Complete learning assessment and assess knowledge base  Interventions:  - Provide teaching at level of understanding  - Provide teaching via preferred learning methods  Outcome: Progressing     Problem: Potential for Falls  Goal: Patient will remain free of falls  Description  INTERVENTIONS:  - Assess patient frequently for physical needs  -  Identify cognitive and physical deficits and behaviors that affect risk of falls    -  [place] fall precautions as indicated by assessment   - Educate patient/family on patient safety including physical limitations  - Instruct patient to call for assistance with activity based on assessment  - Modify environment to reduce risk of injury  - Consider OT/PT consult to assist with strengthening/mobility  Outcome: Progressing     Problem: Nutrition/Hydration-ADULT  Goal: Nutrient/Hydration intake appropriate for improving, restoring or maintaining nutritional needs  Description  Monitor and assess patient's nutrition/hydration status for malnutrition  Collaborate with interdisciplinary team and initiate plan and interventions as ordered  Monitor patient's weight and dietary intake as ordered or per policy  Utilize nutrition screening tool and intervene as necessary  Determine patient's food preferences and provide high-protein, high-caloric foods as appropriate       INTERVENTIONS:  - Monitor oral intake, urinary output, labs, and treatment plans  - Assess nutrition and hydration status and recommend course of action  - Evaluate amount of meals eaten  - Assist patient with eating if necessary   - Allow adequate time for meals  - Recommend/ encourage appropriate diets, oral nutritional supplements, and vitamin/mineral supplements  - Order, calculate, and assess calorie counts as needed  - Recommend, monitor, and adjust tube feedings and TPN/PPN based on assessed needs  - Assess need for intravenous fluids  - Provide specific nutrition/hydration education as appropriate  - Include patient/family/caregiver in decisions related to nutrition  Outcome: Progressing     Problem: CONFUSION/THOUGHT DISTURBANCE  Goal: Thought disturbances (confusion, delirium, depression, dementia or psychosis) are managed to maintain or return to baseline mental status and functional level  Description  INTERVENTIONS:  - Assess for possible contributors to  thought disturbance, including but not limited to medications, infection, impaired vision or hearing, underlying metabolic abnormalities, dehydration, respiratory compromise,  psychiatric diagnoses and notify attending PHYSICAN/AP  - Monitor and intervene to maintain adequate nutrition, hydration, elimination, sleep and activity  - Decrease environmental stimuli, including noise as appropriate  - Provide frequent contacts to provide refocusing, direction and reassurance as needed  Approach patient calmly with eye contact and at their level  - [place] high risk fall precautions, aspiration precautions and other safety measures, as indicated  - If delirium suspected, notify physician/AP of change in condition and request immediate in-person evaluation  - Pursue consults as appropriate including Geriatric (campus dependent), OT for cognitive evaluation/activity planning, psychiatric, pastoral care, etc   Outcome: Progressing     Problem: BEHAVIOR  Goal: Pt/Family maintain appropriate behavior and adhere to behavioral management agreement, if implemented  Description  INTERVENTIONS:  - Assess the family dynamic   - Encourage verbalization of thoughts and concerns in a socially appropriate manner  - Assess patient/family's coping skills and non-compliant behavior (including use of illegal substances)  - Utilize positive, consistent limit setting strategies supporting safety of patient, staff and others  - Initiate consult with Case Management, Spiritual Care or other ancillary services as appropriate  - If a patient's/visitor's behavior jeopardizes the safety of the patient, staff, or others, refer to organization procedure     - Notify Security of behavior or suspected illegal substances which indicate the need for search of the patient and/or belongings  - Encourage participation in the decision making process about a behavioral management agreement; implement if patient meets criteria  Outcome: Progressing     Problem: SAFETY,RESTRAINT: NV/NON-SELF DESTRUCTIVE BEHAVIOR  Goal: Remains free of harm/injury (restraint for non violent/non self-detsructive behavior)  Description  INTERVENTIONS:  - Instruct patient/family regarding restraint use   - Assess and monitor physiologic and psychological status   - Provide interventions and comfort measures to meet assessed patient needs   - Identify and implement measures to help patient regain control  - Assess readiness for release of restraint   Outcome: Progressing  Goal: Returns to optimal restraint-free functioning  Description  INTERVENTIONS:  - Assess the patient's behavior and symptoms that indicate continued need for restraint  - Identify and implement measures to help patient regain control  - Assess readiness for release of restraint   Outcome: Progressing

## 2019-09-30 NOTE — PROGRESS NOTES
Progress Note Jade Levy 1/30/1924, 80 y o  male MRN: [id number]    [facility] Encounter: [id number]    Primary Care Provider: Aman Agrawal DO   Date and time admitted to hospital: 9/22/2019  6:37 PM        * SAH (subarachnoid hemorrhage) [facility])  Assessment & Plan  Neuro input appreciated  NO AP or AC  MRI and CT head shows stable hemorrhage  Continue PT/OT  Fall precaution    Encephalopathy  Assessment & Plan  Geriatric consult, appreciate their recc's  Avoid deliriogenic medications, Maintain circadian rhythm  Encourage hydration as it appears a lot of his encephalopathy might have been due to his FRANCOIS  Restraints only to protect patient from harming himself as he keeps trying to pull out his Hoffmann this has been a big issue for him these past few days despite redirection    FRANCOIS (acute kidney injury) ([facility] [address] )  Assessment & Plan  Encouraged hydration  Monitor BUN creatinine  Avoid nephrotoxin agents  Improving, will DC IV fluids as patient is eating and drinking    A-fib ([facility] [address] )  Assessment & Plan  Currently sinus  No rate control medicines needed  No AC due to bleed    Urinary retention  Assessment & Plan  The patient has had Hoffmann catheter placed twice as he has failed voiding trials  Urology consulted, appreciate their recommendations    CKD (chronic kidney disease) stage 3, GFR 30-59 ml/min ([facility] [address] )  Assessment & Plan  Patient has some kidneys disease although it is difficult to tell his baseline creatinine due to last labs being several years prior  Will continue as above    Coronary artery disease involving native coronary artery of native heart without angina pectoris  Assessment & Plan  S/p remote BMS  Pt on no meds at home    Essential hypertension  Assessment & Plan  Continue Norvasc  Goal SBP < 140  Labetalol prn      VTE Pharmacologic Prophylaxis:   Pharmacologic: Pharmacologic VTE Prophylaxis contraindicated due to [facility]  Mechanical VTE Prophylaxis in Place: Yes    Patient Centered Rounds:  I have performed bedside rounds with nursing staff today  Discussions with Specialists or Other Care Team Provider:  Urology    Education and Discussions with Family / Patient:  Patient and family    Time Spent for Care: 45 minutes  More than 50% of total time spent on counseling and coordination of care as described above  Current Length of Stay: 8 day(s)    Current Patient Status: Inpatient   Certification Statement: The patient will continue to require additional inpatient hospital stay due to Pending rehab placement and further stabilization    Discharge Plan:  Likely discharge to rehabilitation facility    Code Status: Level 3 - DNAR and DNI      Subjective:   No acute overnight events  This morning patient states he feels well  He continues to pull in his Hoffmann so he is placed back on restraints  He denies any chest pain, shortness of breath, nausea, fever  Objective:     Vitals:   Temp (24hrs), Av 5 °F (36 9 °C), Min:98 5 °F (36 9 °C), Max:98 5 °F (36 9 °C)    Temp:  [98 5 °F (36 9 °C)] 98 5 °F (36 9 °C)  HR:  [74-89] 74  Resp:  [18-20] 20  BP: (135-168)/(75-92) 135/76  SpO2:  [96 %-99 %] 97 %  Body mass index is 24 32 kg/m²  Input and Output Summary (last 24 hours): Intake/Output Summary (Last 24 hours) at 2019 1728  Last data filed at 2019 1315  Gross per 24 hour   Intake 2415 ml   Output 3975 ml   Net -1560 ml       Physical Exam:     Physical Exam   Constitutional: He appears well-developed and well-nourished  HENT:   Head: Normocephalic and atraumatic  Mouth/Throat: Oropharynx is clear and moist    Eyes: Pupils are equal, round, and reactive to light  Conjunctivae are normal    Neck: Neck supple  No JVD present  Cardiovascular: Normal rate, regular rhythm, normal heart sounds and intact distal pulses  Pulmonary/Chest: Effort normal and breath sounds normal    Abdominal: Soft  Bowel sounds are normal    Musculoskeletal: He exhibits no edema     Neurological: He is alert    Oriented to person   Skin: Skin is warm and dry  Capillary refill takes less than 2 seconds  Psychiatric:   Flat affect   Nursing note and vitals reviewed  Additional Data:     Labs:    Results from last 7 days   Lab Units 09/24/19  0648   WBC Thousand/uL 8 41   HEMOGLOBIN g/dL 13 3   HEMATOCRIT % 39 1   PLATELETS Thousands/uL 160   NEUTROS PCT % 76*   LYMPHS PCT % 15   MONOS PCT % 6   EOS PCT % 1     Results from last 7 days   Lab Units 09/29/19  0532   SODIUM mmol/L 138   POTASSIUM mmol/L 3 6   CHLORIDE mmol/L 109*   CO2 mmol/L 23   BUN mg/dL 36*   CREATININE mg/dL 1 79*   ANION GAP mmol/L 6   CALCIUM mg/dL 8 7   GLUCOSE RANDOM mg/dL 102         Results from last 7 days   Lab Units 09/28/19  1104   POC GLUCOSE mg/dl 120                   * I Have Reviewed All Lab Data Listed Above  * Additional Pertinent Lab Tests Reviewed: Trevor 66 Admission Reviewed    Imaging:    Imaging Reports Reviewed Today Include: n/a  Imaging Personally Reviewed by Myself Includes:  n/a    Recent Cultures (last 7 days):           Last 24 Hours Medication List:     Current Facility-Administered Medications:  acetaminophen 975 mg Oral [facility] [address] Tuesday M MARC Moreno   amLODIPine 5 mg Oral Daily Buster Mckenzie DO   divalproex sodium 500 mg Oral QPM Deisy Muro MD   Labetalol HCl 5 mg Intravenous Q4H PRN Alfonso Will MD   melatonin 3 mg Oral HS Deisy Muro MD   nystatin  Topical BID Lilibeth Torrez DO        Today, Patient Was Seen By: Alfonso Will MD    ** Please Note: Dictation voice to text software may have been used in the creation of this document   **

## 2019-09-30 NOTE — PLAN OF CARE
Problem: Prexisting or High Potential for Compromised Skin Integrity  Goal: Skin integrity is maintained or improved  Description  INTERVENTIONS:  - Identify patients at risk for skin breakdown  - Assess and monitor skin integrity  - Assess and monitor nutrition and hydration status  - Monitor labs   - Assess for incontinence   - Turn and reposition patient  - Assist with mobility/ambulation  - Relieve pressure over bony prominences  - Avoid friction and shearing  - Provide appropriate hygiene as needed including keeping skin clean and dry  - Evaluate need for skin moisturizer/barrier cream  - Collaborate with interdisciplinary team   - Patient/family teaching  - Consider wound care consult   Outcome: Progressing     Problem: Neurological Deficit  Goal: Neurological status is stable or improving  Description  Interventions:  - Monitor and assess patient's level of consciousness, motor function, sensory function, and level of assistance needed for ADLs  - Monitor and report changes from baseline  Collaborate with interdisciplinary team to initiate plan and implement interventions as ordered  - Provide and maintain a safe environment  - Consider seizure precautions  - Consider fall precautions  - Consider aspiration precautions  - Consider bleeding precautions  Outcome: Progressing     Problem: Activity Intolerance/Impaired Mobility  Goal: Mobility/activity is maintained at optimum level for patient  Description  Interventions:  - Assess and monitor patient  barriers to mobility and need for assistive/adaptive devices  - Assess patient's emotional response to limitations  - Collaborate with interdisciplinary team and initiate plans and interventions as ordered  - Encourage independent activity per ability   - Maintain proper body alignment  - Perform active/passive rom as tolerated/ordered    - Plan activities to conserve energy   - Turn patient as appropriate  Outcome: Progressing     Problem: Communication Impairment  Goal: Ability to express needs and understand communication  Description  Assess patient's communication skills and ability to understand information  Patient will demonstrate use of effective communication techniques, alternative methods of communication and understanding even if not able to speak  - Encourage communication and provide alternate methods of communication as needed  - Collaborate with case management/ for discharge needs  - Include patient/family/caregiver in decisions related to communication  Outcome: Progressing     Problem: Potential for Aspiration  Goal: Non-ventilated patient's risk of aspiration is minimized  Description  Assess and monitor vital signs, respiratory status, and labs (WBC)  Monitor for signs of aspiration (tachypnea, cough, rales, wheezing, cyanosis, fever)  - Assess and monitor patient's ability to swallow  - Place patient up in chair to eat if possible  - HOB up at 90 degrees to eat if unable to get patient up into chair   - Supervise patient during oral intake  - Instruct patient/ family to take small bites  - Instruct patient/ family to take small single sips when taking liquids  - Follow patient-specific strategies generated by speech pathologist   Outcome: Progressing     Problem: Nutrition  Goal: Nutrition/Hydration status is improving  Description  Monitor and assess patient's nutrition/hydration status for malnutrition (ex- brittle hair, bruises, dry skin, pale skin and conjunctiva, muscle wasting, smooth red tongue, and disorientation)  Collaborate with interdisciplinary team and initiate plan and interventions as ordered  Monitor patient's weight and dietary intake as ordered or per policy  Utilize nutrition screening tool and intervene per policy  Determine patient's food preferences and provide high-protein, high-caloric foods as appropriate       - Assist patient with eating   - Allow adequate time for meals   - Encourage patient to take dietary supplement as ordered  - Collaborate with clinical nutritionist   - Include patient/family/caregiver in decisions related to nutrition  Outcome: Progressing     Problem: NEUROSENSORY - ADULT  Goal: Achieves stable or improved neurological status  Description  INTERVENTIONS  - Monitor and report changes in neurological status  - Monitor vital signs such as temperature, blood pressure, glucose, and any other labs ordered   - Initiate measures to prevent increased intracranial pressure  - Monitor for seizure activity and implement precautions if appropriate      Outcome: Progressing  Goal: Achieves maximal functionality and self care  Description  INTERVENTIONS  - Monitor swallowing and airway patency with patient fatigue and changes in neurological status  - Encourage and assist patient to increase activity and self care     - Encourage visually impaired, hearing impaired and aphasic patients to use assistive/communication devices  Outcome: Progressing     Problem: CARDIOVASCULAR - ADULT  Goal: Maintains optimal cardiac output and hemodynamic stability  Description  INTERVENTIONS:  - Monitor I/O, vital signs and rhythm  - Monitor for S/S and trends of decreased cardiac output  - Administer and titrate ordered vasoactive medications to optimize hemodynamic stability  - Assess quality of pulses, skin color and temperature  - Assess for signs of decreased coronary artery perfusion  - Instruct patient to report change in severity of symptoms  Outcome: Progressing  Goal: Absence of cardiac dysrhythmias or at baseline rhythm  Description  INTERVENTIONS:  - Continuous cardiac monitoring, vital signs, obtain 12 lead EKG if ordered  - Administer antiarrhythmic and heart rate control medications as ordered  - Monitor electrolytes and administer replacement therapy as ordered  Outcome: Progressing     Problem: GASTROINTESTINAL - ADULT  Goal: Maintains or returns to baseline bowel function  Description  INTERVENTIONS:  - Assess bowel function  - Encourage oral fluids to ensure adequate hydration  - Administer IV fluids if ordered to ensure adequate hydration  - Administer ordered medications as needed  - Encourage mobilization and activity  - Consider nutritional services referral to assist patient with adequate nutrition and appropriate food choices  Outcome: Progressing  Goal: Maintains adequate nutritional intake  Description  INTERVENTIONS:  - Monitor percentage of each meal consumed  - Identify factors contributing to decreased intake, treat as appropriate  - Assist with meals as needed  - Monitor I&O, weight, and lab values if indicated  - Obtain nutrition services referral as needed  Outcome: Progressing     Problem: GENITOURINARY - ADULT  Goal: Urinary catheter remains patent  Description  INTERVENTIONS:  - Assess patency of urinary catheter  - If patient has a chronic barrera, consider changing catheter if non-functioning  - Follow guidelines for intermittent irrigation of non-functioning urinary catheter  Outcome: Progressing     Problem: METABOLIC, FLUID AND ELECTROLYTES - ADULT  Goal: Electrolytes maintained within normal limits  Description  INTERVENTIONS:  - Monitor labs and assess patient for signs and symptoms of electrolyte imbalances  - Administer electrolyte replacement as ordered  - Monitor response to electrolyte replacements, including repeat lab results as appropriate  - Instruct patient on fluid and nutrition as appropriate  Outcome: Progressing  Goal: Fluid balance maintained  Description  INTERVENTIONS:  - Monitor labs   - Monitor I/O and WT  - Instruct patient on fluid and nutrition as appropriate  - Assess for signs & symptoms of volume excess or deficit  Outcome: Progressing     Problem: SKIN/TISSUE INTEGRITY - ADULT  Goal: Skin integrity remains intact  Description  INTERVENTIONS  - Identify patients at risk for skin breakdown  - Assess and monitor skin integrity  - Assess and monitor nutrition and hydration status  - Monitor labs (i e  albumin)  - Assess for incontinence   - Turn and reposition patient  - Assist with mobility/ambulation  - Relieve pressure over bony prominences  - Avoid friction and shearing  - Provide appropriate hygiene as needed including keeping skin clean and dry  - Evaluate need for skin moisturizer/barrier cream  - Collaborate with interdisciplinary team (i e  Nutrition, Rehabilitation, etc )   - Patient/family teaching  Outcome: Progressing     Problem: HEMATOLOGIC - ADULT  Goal: Maintains hematologic stability  Description  INTERVENTIONS  - Assess for signs and symptoms of bleeding or hemorrhage  - Monitor labs  - Administer supportive blood products/factors as ordered and appropriate  Outcome: Progressing     Problem: MUSCULOSKELETAL - ADULT  Goal: Maintain or return mobility to safest level of function  Description  INTERVENTIONS:  - Assess patient's ability to carry out ADLs; assess patient's baseline for ADL function and identify physical deficits which impact ability to perform ADLs (bathing, care of mouth/teeth, toileting, grooming, dressing, etc )  - Assess/evaluate cause of self-care deficits   - Assess range of motion  - Assess patient's mobility  - Assess patient's need for assistive devices and provide as appropriate  - Encourage maximum independence but intervene and supervise when necessary  - Involve family in performance of ADLs  - Assess for home care needs following discharge   - Consider OT consult to assist with ADL evaluation and planning for discharge  - Provide patient education as appropriate  Outcome: Progressing     Problem: PAIN - ADULT  Goal: Verbalizes/displays adequate comfort level or baseline comfort level  Description  Interventions:  - Encourage patient to monitor pain and request assistance  - Assess pain using appropriate pain scale  - Administer analgesics based on type and severity of pain and evaluate response  - Implement non-pharmacological measures as appropriate and evaluate response  - Consider cultural and social influences on pain and pain management  - Notify physician/advanced practitioner if interventions unsuccessful or patient reports new pain  Outcome: Progressing     Problem: INFECTION - ADULT  Goal: Absence or prevention of progression during hospitalization  Description  INTERVENTIONS:  - Assess and monitor for signs and symptoms of infection  - Monitor lab/diagnostic results  - Monitor all insertion sites, i e  indwelling lines, tubes, and drains  - [place] appropriate cooling/warming therapies per order  - Administer medications as ordered  - Instruct and encourage patient and family to use good hand hygiene technique  - Identify and instruct in appropriate isolation precautions for identified infection/condition   Outcome: Progressing     Problem: SAFETY ADULT  Goal: Patient will remain free of falls  Description  INTERVENTIONS:  - Assess patient frequently for physical needs  -  Identify cognitive and physical deficits and behaviors that affect risk of falls    -  [place] fall precautions as indicated by assessment   - Educate patient/family on patient safety including physical limitations  - Instruct patient to call for assistance with activity based on assessment  - Modify environment to reduce risk of injury  - Consider OT/PT consult to assist with strengthening/mobility  Outcome: Progressing  Goal: Maintain or return to baseline ADL function  Description  INTERVENTIONS:  -  Assess patient's ability to carry out ADLs; assess patient's baseline for ADL function and identify physical deficits which impact ability to perform ADLs (bathing, care of mouth/teeth, toileting, grooming, dressing, etc )  - Assess/evaluate cause of self-care deficits   - Assess range of motion  - Assess patient's mobility; develop plan if impaired  - Assess patient's need for assistive devices and provide as appropriate  - Encourage maximum independence but intervene and supervise when necessary  - Involve family in performance of ADLs  - Assess for home care needs following discharge   - Consider OT consult to assist with ADL evaluation and planning for discharge  - Provide patient education as appropriate  Outcome: Progressing  Goal: Maintain or return mobility status to optimal level  Description  INTERVENTIONS:  - Assess patient's baseline mobility status (ambulation, transfers, stairs, etc )    - Identify cognitive and physical deficits and behaviors that affect mobility  - Identify mobility aids required to assist with transfers and/or ambulation (gait belt, sit-to-stand, lift, walker, cane, etc )  - [place] fall precautions as indicated by assessment  - Record patient progress and toleration of activity level on Mobility SBAR; progress patient to next Phase/Stage  - Instruct patient to call for assistance with activity based on assessment  - Consider rehabilitation consult to assist with strengthening/weightbearing, etc   Outcome: Progressing     Problem: DISCHARGE PLANNING  Goal: Discharge to home or other facility with appropriate resources  Description  INTERVENTIONS:  - Identify barriers to discharge w/patient and caregiver  - Arrange for needed discharge resources and transportation as appropriate  - Identify discharge learning needs (meds, wound care, etc )  - Arrange for interpretive services to assist at discharge as needed  - Refer to Case Management Department for coordinating discharge planning if the patient needs post-hospital services based on physician/advanced practitioner order or complex needs related to functional status, cognitive ability, or social support system  Outcome: Progressing     Problem: Knowledge Deficit  Goal: Patient/family/caregiver demonstrates understanding of disease process, treatment plan, medications, and discharge instructions  Description  Complete learning assessment and assess knowledge base  Interventions:  - Provide teaching at level of understanding  - Provide teaching via preferred learning methods  Outcome: Progressing     Problem: Potential for Falls  Goal: Patient will remain free of falls  Description  INTERVENTIONS:  - Assess patient frequently for physical needs  -  Identify cognitive and physical deficits and behaviors that affect risk of falls  -  [place] fall precautions as indicated by assessment   - Educate patient/family on patient safety including physical limitations  - Instruct patient to call for assistance with activity based on assessment  - Modify environment to reduce risk of injury  - Consider OT/PT consult to assist with strengthening/mobility  Outcome: Progressing     Problem: Nutrition/Hydration-ADULT  Goal: Nutrient/Hydration intake appropriate for improving, restoring or maintaining nutritional needs  Description  Monitor and assess patient's nutrition/hydration status for malnutrition  Collaborate with interdisciplinary team and initiate plan and interventions as ordered  Monitor patient's weight and dietary intake as ordered or per policy  Utilize nutrition screening tool and intervene as necessary  Determine patient's food preferences and provide high-protein, high-caloric foods as appropriate       INTERVENTIONS:  - Monitor oral intake, urinary output, labs, and treatment plans  - Assess nutrition and hydration status and recommend course of action  - Evaluate amount of meals eaten  - Assist patient with eating if necessary   - Allow adequate time for meals  - Recommend/ encourage appropriate diets, oral nutritional supplements, and vitamin/mineral supplements  - Order, calculate, and assess calorie counts as needed  - Recommend, monitor, and adjust tube feedings and TPN/PPN based on assessed needs  - Assess need for intravenous fluids  - Provide specific nutrition/hydration education as appropriate  - Include patient/family/caregiver in decisions related to nutrition  Outcome: Progressing     Problem: CONFUSION/THOUGHT DISTURBANCE  Goal: Thought disturbances (confusion, delirium, depression, dementia or psychosis) are managed to maintain or return to baseline mental status and functional level  Description  INTERVENTIONS:  - Assess for possible contributors to  thought disturbance, including but not limited to medications, infection, impaired vision or hearing, underlying metabolic abnormalities, dehydration, respiratory compromise,  psychiatric diagnoses and notify attending PHYSICAN/AP  - Monitor and intervene to maintain adequate nutrition, hydration, elimination, sleep and activity  - Decrease environmental stimuli, including noise as appropriate  - Provide frequent contacts to provide refocusing, direction and reassurance as needed  Approach patient calmly with eye contact and at their level  - [place] high risk fall precautions, aspiration precautions and other safety measures, as indicated  - If delirium suspected, notify physician/AP of change in condition and request immediate in-person evaluation  - Pursue consults as appropriate including Geriatric (campus dependent), OT for cognitive evaluation/activity planning, psychiatric, pastoral care, etc   Outcome: Progressing     Problem: BEHAVIOR  Goal: Pt/Family maintain appropriate behavior and adhere to behavioral management agreement, if implemented  Description  INTERVENTIONS:  - Assess the family dynamic   - Encourage verbalization of thoughts and concerns in a socially appropriate manner  - Assess patient/family's coping skills and non-compliant behavior (including use of illegal substances)    - Utilize positive, consistent limit setting strategies supporting safety of patient, staff and others  - Initiate consult with Case Management, Spiritual Care or other ancillary services as appropriate  - If a patient's/visitor's behavior jeopardizes the safety of the patient, staff, or others, refer to organization procedure  - Notify Security of behavior or suspected illegal substances which indicate the need for search of the patient and/or belongings  - Encourage participation in the decision making process about a behavioral management agreement; implement if patient meets criteria  Outcome: Progressing     Problem: SELF HARM  Goal: Effect of psychiatric condition will be minimized and patient will be protected from self harm  Description  INTERVENTIONS:  - Assess impact of patient's symptoms on level of functioning, self-care needs and offer support as indicated  - Assess patient/family knowledge of depression, impact on illness and need for teaching  - Provide emotional support, presence and reassurance  - Assess for possible suicidal thoughts, ideation or self-harm  If patient expresses suicidal thoughts or statements do not leave alone, notify physician/AP immediately, initiate suicide precautions, and determine need for continual observation    - initiate consults and referrals as appropriate (a mental health professional, Spiritual Care  Outcome: Progressing     Problem: SAFETY,RESTRAINT: NV/NON-SELF DESTRUCTIVE BEHAVIOR  Goal: Remains free of harm/injury (restraint for non violent/non self-detsructive behavior)  Description  INTERVENTIONS:  - Instruct patient/family regarding restraint use   - Assess and monitor physiologic and psychological status   - Provide interventions and comfort measures to meet assessed patient needs   - Identify and implement measures to help patient regain control  - Assess readiness for release of restraint   Outcome: Progressing  Goal: Returns to optimal restraint-free functioning  Description  INTERVENTIONS:  - Assess the patient's behavior and symptoms that indicate continued need for restraint  - Identify and implement measures to help patient regain control  - Assess readiness for release of restraint   Outcome: Progressing

## 2019-09-30 NOTE — ASSESSMENT & PLAN NOTE
Encouraged hydration  Monitor BUN creatinine  Avoid nephrotoxin agents  Improving, will DC IV fluids as patient is eating and drinking

## 2019-09-30 NOTE — ASSESSMENT & PLAN NOTE
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

## 2019-09-30 NOTE — SOCIAL WORK
Received a call from [facility] at [facility] [phone] who states pt is accepted  Received a call from Hannah Wright at [facility] [phone] who states pt is accepted  Message left for pt's son Ruby Geller to inform of same and to discuss preference  Awaiting call back

## 2019-10-01 LAB
ANION GAP SERPL CALCULATED.3IONS-SCNC: 7 MMOL/L (ref 4–13)
ANION GAP SERPL CALCULATED.3IONS-SCNC: 7 MMOL/L (ref 4–13)
BUN SERPL-MCNC: 33 MG/DL (ref 5–25)
BUN SERPL-MCNC: 46 MG/DL (ref 5–25)
CALCIUM SERPL-MCNC: 9.1 MG/DL (ref 8.3–10.1)
CALCIUM SERPL-MCNC: 9.2 MG/DL (ref 8.3–10.1)
CHLORIDE SERPL-SCNC: 107 MMOL/L (ref 100–108)
CHLORIDE SERPL-SCNC: 108 MMOL/L (ref 100–108)
CO2 SERPL-SCNC: 24 MMOL/L (ref 21–32)
CO2 SERPL-SCNC: 25 MMOL/L (ref 21–32)
CREAT SERPL-MCNC: 1.62 MG/DL (ref 0.6–1.3)
CREAT SERPL-MCNC: 1.99 MG/DL (ref 0.6–1.3)
GFR SERPL CREATININE-BSD FRML MDRD: 28 ML/MIN/1.73SQ M
GFR SERPL CREATININE-BSD FRML MDRD: 36 ML/MIN/1.73SQ M
GLUCOSE SERPL-MCNC: 88 MG/DL (ref 65–140)
GLUCOSE SERPL-MCNC: 89 MG/DL (ref 65–140)
POTASSIUM SERPL-SCNC: 4 MMOL/L (ref 3.5–5.3)
POTASSIUM SERPL-SCNC: 4.7 MMOL/L (ref 3.5–5.3)
SODIUM SERPL-SCNC: 138 MMOL/L (ref 136–145)
SODIUM SERPL-SCNC: 140 MMOL/L (ref 136–145)

## 2019-10-01 PROCEDURE — 97535 SELF CARE MNGMENT TRAINING: CPT

## 2019-10-01 PROCEDURE — 80048 BASIC METABOLIC PNL TOTAL CA: CPT | Performed by: INTERNAL MEDICINE

## 2019-10-01 PROCEDURE — 99232 SBSQ HOSP IP/OBS MODERATE 35: CPT | Performed by: INTERNAL MEDICINE

## 2019-10-01 PROCEDURE — 97530 THERAPEUTIC ACTIVITIES: CPT

## 2019-10-01 RX ORDER — LANOLIN ALCOHOL/MO/W.PET/CERES
6 CREAM (GRAM) TOPICAL
Status: DISCONTINUED | OUTPATIENT
Start: 2019-10-01 | End: 2019-10-02

## 2019-10-01 RX ORDER — SODIUM CHLORIDE 9 MG/ML
100 INJECTION, SOLUTION INTRAVENOUS CONTINUOUS
Status: DISCONTINUED | OUTPATIENT
Start: 2019-10-01 | End: 2019-10-01

## 2019-10-01 RX ORDER — SODIUM CHLORIDE 9 MG/ML
75 INJECTION, SOLUTION INTRAVENOUS CONTINUOUS
Status: DISCONTINUED | OUTPATIENT
Start: 2019-10-01 | End: 2019-10-03

## 2019-10-01 RX ORDER — DIVALPROEX SODIUM 125 MG/1
500 CAPSULE, COATED PELLETS ORAL DAILY
Status: DISCONTINUED | OUTPATIENT
Start: 2019-10-01 | End: 2019-10-12 | Stop reason: HOSPADM

## 2019-10-01 RX ADMIN — SODIUM CHLORIDE 100 ML/HR: 0.9 INJECTION, SOLUTION INTRAVENOUS at 18:01

## 2019-10-01 RX ADMIN — NYSTATIN: 100000 POWDER TOPICAL at 09:08

## 2019-10-01 RX ADMIN — MELATONIN 6 MG: 3 TAB ORAL at 21:12

## 2019-10-01 RX ADMIN — NYSTATIN: 100000 POWDER TOPICAL at 18:06

## 2019-10-01 RX ADMIN — ACETAMINOPHEN 975 MG: 325 TABLET ORAL at 05:40

## 2019-10-01 RX ADMIN — DIVALPROEX SODIUM 500 MG: 125 CAPSULE, COATED PELLETS ORAL at 18:00

## 2019-10-01 RX ADMIN — AMLODIPINE BESYLATE 5 MG: 5 TABLET ORAL at 09:08

## 2019-10-01 RX ADMIN — ACETAMINOPHEN 975 MG: 325 TABLET ORAL at 21:12

## 2019-10-01 RX ADMIN — GENTAMICIN SULFATE 0.5 INCH: 3 OINTMENT OPHTHALMIC at 18:06

## 2019-10-01 RX ADMIN — GENTAMICIN SULFATE 0.5 INCH: 3 OINTMENT OPHTHALMIC at 13:30

## 2019-10-01 NOTE — OCCUPATIONAL THERAPY NOTE
Occupational Therapy Treatment Note       10/01/19 1214   Restrictions/Precautions   Weight Bearing Precautions Per Order No   Other Precautions Fall Risk;Bed Alarm;Cognitive   Lifestyle   Autonomy I ADLS, assist IADLS, and Mod I w/ transfers and functional mobility PTA   Reciprocal Relationships Pt lives w/ spouse; has son and dght in law next door   Service to Others Pt is retired   Intrinsic Gratification Pt enjoys gardening   Pain Assessment   Pain Assessment FLACC   Pain Rating: FLACC (Rest) - Face 0   Pain Rating: FLACC (Rest) - Legs 0   Pain Rating: FLACC (Rest) - Activity 0   Pain Rating: FLACC (Rest) - Cry 0   Pain Rating: FLACC (Rest) - Consolability 0   Score: FLACC (Rest) 0   Pain Rating: FLACC (Activity) - Face 0   Pain Rating: FLACC (Activity) - Legs 0   Pain Rating: FLACC (Activity) - Activity 0   Pain Rating: FLACC (Activity) - Cry 0   Pain Rating: FLACC (Activity) - Consolability 0   Score: FLACC (Activity) 0   ADL   Where Assessed Edge of bed   Grooming Assistance 2  Maximal Assistance   Grooming Deficit Wash/dry face;Shaving   Grooming Comments pt required hand over hand assist to complete washing face with min carryover this session  Bed Mobility   Supine to Sit 2  Maximal assistance   Additional items Assist x 2   Sit to Supine 2  Maximal assistance   Additional items Assist x 2   Transfers   Sit to Stand 3  Moderate assistance   Additional items Assist x 2   Stand to Sit 3  Moderate assistance   Additional items Assist x 2   Cognition   Overall Cognitive Status Impaired   Arousal/Participation Arousable;Responsive   Attention Difficulty attending to directions   Orientation Level Oriented to person;Disoriented to place; Disoriented to time;Disoriented to situation   Memory Decreased recall of precautions;Decreased recall of recent events;Decreased short term memory   Following Commands Follows one step commands inconsistently   Comments verbal cues to maintain awake/alert status   Activity Tolerance   Activity Tolerance Patient tolerated treatment well   Assessment   Assessment Pt participated in occupational therapy with focus on activity tolerance, bed mob, unsupported sitting balance and tolerance for pt engagement in functional self-care task/grooming tasks  Pt cleared by MALINDA/Bryce for pt participation in therapy  Pt received HOB raised/supine pt sleeping and awake to name called  Pt Identifiers confirmed  Pt unable to report his goal today 2* pt non-verbal throughout session  Pt requires assist x 2 for bed mob and assist required for sitting balance 2* pt balance and strength deficits  Pt will require in-pt rehab to continue to address pt noted deficits with decreased strength, coordination, balance and activity tolerance which currently impair pt ADL and functional mob  Pt bed alarm active post session all needs within reach     Plan   Treatment Interventions ADL retraining;Functional transfer training;Continued evaluation   Goal Expiration Date 10/07/19   OT Treatment Day 2   OT Frequency 3-5x/wk   Recommendation   OT Discharge Recommendation Short Term Rehab   Barthel Index   Feeding 10   Bathing 0   Grooming Score 0   Dressing Score 5   Bladder Score 0   Bowels Score 10   Toilet Use Score 5   Transfers (Bed/Chair) Score 5   Mobility (Level Surface) Score 0   Stairs Score 0   B[place] Index Score 35   Modified [place] Scale   Modified [place] Scale 4     Dionte Keas  HERNANDEZ/L

## 2019-10-01 NOTE — PROGRESS NOTES
Progress Note Griselda Little 1/30/1924, 80 y o  male MRN: [id number]    Unit/Bed#: [facility] 717-01 Encounter: [id number]    Primary Care Provider: Aubrey Rojas DO   Date and time admitted to hospital: 9/22/2019  6:37 PM        * SA[facility]subarachn[facility]) ([facility] )  Assessment & Plan  NO AP or AC  MRI and CT head shows stable hemorrhage  Continue PT/OT  Fall precaution    Encephalopathy  Assessment & Plan  More sedated today but did participate with PT   supect sleep deprivation  Increase melatonin to 6mg at HS  Iv hydration x500ml due to poor oral intake today      Urinary retention  Assessment & Plan  The patient has had Barrera catheter placed twice as he has failed voiding trials  Continue barrera catheter for now until more mobile and alter    CKD (chronic kidney disease) stage 3, GFR 30-59 ml/min ([facility])  Assessment & Plan  Unknown baseline renal function, continue to monitor BMP    FRANCOIS (acute kidney injury) ([facility] )  Assessment & Plan  Encouraged hydration  Monitor BUN creatinine  Avoid nephrotoxin agents    A-fib ([facility])  Assessment & Plan  Currently sinus  No rate control medicines needed  No AC due to bleed    Coronary artery disease involving native coronary artery of native heart without angina pectoris  Assessment & Plan  S/p remote BMS  Pt on no meds at home    Essential hypertension  Assessment & Plan  Continue Norvasc  Goal SBP < 140  Labetalol prn        VTE Pharmacologic Prophylaxis:   Pharmacologic: Heparin  Mechanical VTE Prophylaxis in Place: Yes    Patient Centered Rounds: I have performed bedside rounds with nursing staff today  Education and Discussions with Family / Patient: patient's son, plan of care    Time Spent for Care: 20 minutes  More than 50% of total time spent on counseling and coordination of care as described above      Current Length of Stay: 9 day(s)    Current Patient Status: Inpatient   Certification Statement: The patient will continue to require additional inpatient hospital stay due to sedation, IV hydration, discharge planning    Discharge Plan: inpatient rehab when stable, likel 1-2 days    Code Status: Level 3 - DNAR and DNI      Subjective:   Limited due to dementia and sedation  Awaken to voice but quickly goes back asleep    Objective:     Vitals:   Temp (24hrs), Av 8 °F (36 6 °C), Min:97 5 °F (36 4 °C), Max:98 3 °F (36 8 °C)    Temp:  [97 5 °F (36 4 °C)-98 3 °F (36 8 °C)] 97 5 °F (36 4 °C)  HR:  [57-83] 57  Resp:  [16-18] 18  BP: ()/() 95/53  SpO2:  [96 %-98 %] 98 %  Body mass index is 24 25 kg/m²  Input and Output Summary (last 24 hours): Intake/Output Summary (Last 24 hours) at 10/1/2019 1825  Last data filed at 10/1/2019 1333  Gross per 24 hour   Intake 240 ml   Output 950 ml   Net -710 ml       Physical Exam:     Physical Exam   Constitutional: He appears well-developed and well-nourished  HENT:   Head: Normocephalic and atraumatic  Eyes: Pupils are equal, round, and reactive to light  EOM are normal    Neck: Neck supple  Cardiovascular: Normal rate and regular rhythm  Pulmonary/Chest: Effort normal    Abdominal: Soft  Musculoskeletal: He exhibits no edema  Neurological:   Somnolent, arouses to voice  Moves all extremities         Additional Data:     Labs:        Results from last 7 days   Lab Units 10/01/19  0456   SODIUM mmol/L 140   POTASSIUM mmol/L 4 0   CHLORIDE mmol/L 108   CO2 mmol/L 25   BUN mg/dL 33*   CREATININE mg/dL 1 62*   ANION GAP mmol/L 7   CALCIUM mg/dL 9 1   GLUCOSE RANDOM mg/dL 88         Results from last 7 days   Lab Units 19  1104   POC GLUCOSE mg/dl 120                   * I Have Reviewed All Lab Data Listed Above  * Additional Pertinent Lab Tests Reviewed:  All Labs Within Last 24 Hours Reviewed      Recent Cultures (last 7 days):           Last 24 Hours Medication List:     Current Facility-Administered Medications:  acetaminophen 975 mg Oral [facility] Tuesday MARC Sosa    amLODIPine 5 mg Oral Daily Rylie Wiley DO    divalproex sodium 500 mg Oral Daily Kaylene Sicard, MD    gentamicin 0 5 inch Both Eyes BID Kaylene Sicard, MD    Labetalol HCl 5 mg Intravenous Q4H PRN Bailey Gil MD    melatonin 6 mg Oral HS Kaylene Sicard, MD    nystatin  Topical BID Shara Joy DO    sodium chloride 100 mL/hr Intravenous Continuous Kaylene Sicard, MD Last Rate: 100 mL/hr (10/01/19 1801)        Today, Patient Was Seen By: Omari Warner MD    ** Please Note: Dictation voice to text software may have been used in the creation of this document   **

## 2019-10-01 NOTE — PHYSICAL THERAPY NOTE
Physical therapy Treatment Note       10/01/19 1210   Pain Assessment   Pain Assessment FLACC   Pain Rating: FLACC (Rest) - Face 0   Pain Rating: FLACC (Rest) - Legs 0   Pain Rating: FLACC (Rest) - Activity 0   Pain Rating: FLACC (Rest) - Cry 0   Pain Rating: FLACC (Rest) - Consolability 0   Score: FLACC (Rest) 0   Pain Rating: FLACC (Activity) - Face 0   Pain Rating: FLACC (Activity) - Legs 0   Pain Rating: FLACC (Activity) - Activity 0   Pain Rating: FLACC (Activity) - Cry 0   Pain Rating: FLACC (Activity) - Consolability 0   Score: FLACC (Activity) 0   Restrictions/Precautions   Weight Bearing Precautions Per Order No   Other Precautions Bed Alarm; Chair Alarm;Cognitive;Multiple lines; Fall Risk;Telemetry;O2   General   Chart Reviewed Yes   Family/Caregiver Present No   Cognition   Overall Cognitive Status Impaired   Arousal/Participation Lethargic;Cooperative;Poorly responsive   Attention Difficulty attending to directions   Orientation Level Oriented to person   Memory Unable to assess   Following Commands Follows one step commands inconsistently   Comments lethargic, follows approx 5-10% of simple commands  calm affect   Bed Mobility   Supine to Sit 2  Maximal assistance   Additional items Assist x 2   Sit to Supine 2  Maximal assistance   Additional items Assist x 2   Additional Comments repositioned in supine p session   Transfers   Sit to Stand 3  Moderate assistance   Additional items Assist x 2   Stand to Sit 3  Moderate assistance   Additional items Assist x 2   Additional Comments sitting activities, tolerance on EOB x 35 min  Maintains w/ mod /max A of 1   posterior LOB  corrects some w/ tactile input   Ambulation/Elevation   Gait pattern   (slow, ataxia, forward flexion, short step length, )   Gait Assistance 2  Maximal assist   Additional items Assist x 2   Assistive Device   (HHA of 2)   Distance 2-3'x1 from foot of bed to [facility]    needed physical assist to wt shift B    Balance   Static Sitting Poor +   Dynamic Sitting Poor   Static Standing Poor -   Dynamic Standing Poor -   Ambulatory Poor -   Endurance Deficit   Endurance Deficit Yes   Endurance Deficit Description fatigue, weakness, cog/communication   Activity Tolerance   Activity Tolerance Patient limited by fatigue;Treatment limited secondary to medical complications (Comment)   Nurse Made Aware yes   Assessment   Prognosis Fair   Problem List Decreased strength;Decreased endurance; Impaired balance;Decreased mobility; Decreased coordination;Decreased cognition; Impaired judgement;Decreased safety awareness;Pain   Assessment Pt seen for session for setup, bed mob, time spent EOB w/ sitting activities, transfers w/ limited gait, reposiitioning p session  Pt lethargic but cooperative  Still not following many commands  Needs verbal and tactile cues for sitting balance and tolerance  Able to bear some wt B LEs   remains appropriate for rehab at d/c   Goals   Patient Goals unable to state due to MS   STG Expiration Date 10/07/19   PT Treatment Day 1   Plan   Treatment/Interventions Functional transfer training;LE strengthening/ROM; Therapeutic exercise; Endurance training;Patient/family training;Bed mobility; Equipment eval/education;Gait training   Progress Slow progress, medical status limitations   PT Frequency   (3-5x/wk)   Recommendation   Recommendation   (recommend rehab at d/c)   Equipment Recommended Will Guevara   PT - OK to Discharge Yes  (when stable to rehab)   Jose Martin Moy PT, DPT CSRS

## 2019-10-01 NOTE — PLAN OF CARE
Problem: OCCUPATIONAL THERAPY ADULT  Goal: Performs self-care activities at highest level of function for planned discharge setting  See evaluation for individualized goals  Description  Treatment Interventions: ADL retraining, Functional transfer training, UE strengthening/ROM, Endurance training, Cognitive reorientation, Patient/family training, Equipment evaluation/education, Compensatory technique education, Continued evaluation, Energy conservation, Activityengagement          See flowsheet documentation for full assessment, interventions and recommendations  Outcome: Progressing  Note:   Limitation: Decreased ADL status, Decreased UE strength, Decreased Safe judgement during ADL, Decreased cognition, Decreased endurance, Visual deficit, Decreased self-care trans, Decreased high-level ADLs  Prognosis: Fair  Assessment: Pt participated in occupational therapy with focus on activity tolerance, bed mob, unsupported sitting balance and tolerance for pt engagement in functional self-care task/grooming tasks  Pt cleared by MALINDA/Bryce for pt participation in therapy  Pt received HOB raised/supine pt sleeping and awake to name called  Pt Identifiers confirmed  Pt unable to report his goal today 2* pt non-verbal throughout session  Pt requires assist x 2 for bed mob and assist required for sitting balance 2* pt balance and strength deficits  Pt will require in-pt rehab to continue to address pt noted deficits with decreased strength, coordination, balance and activity tolerance which currently impair pt ADL and functional mob  Pt bed alarm active post session all needs within reach       OT Discharge Recommendation: Short Term Rehab  OT - OK to Discharge: Yes(when medically stable)

## 2019-10-01 NOTE — ASSESSMENT & PLAN NOTE
The patient has had Barrera catheter placed twice as he has failed voiding trials  Continue barrera catheter for now until more mobile and alter

## 2019-10-01 NOTE — ASSESSMENT & PLAN NOTE
More sedated today but did participate with PT   supect sleep deprivation  Increase melatonin to 6mg at HS  Iv hydration x500ml due to poor oral intake today

## 2019-10-01 NOTE — SOCIAL WORK
Contacted pt's son Winifred Landaverde, contact # [phone], to discuss pt's discharge plan  Informed him that [facility] and [facility] are able to accept pt  He stated that he would prefer [facility]  IMM reviewed with him  [facility] message sent to [facility] to inform them that pt's family prefers their facility

## 2019-10-01 NOTE — PLAN OF CARE
Problem: Prexisting or High Potential for Compromised Skin Integrity  Goal: Skin integrity is maintained or improved  Description  INTERVENTIONS:  - Identify patients at risk for skin breakdown  - Assess and monitor skin integrity  - Assess and monitor nutrition and hydration status  - Monitor labs   - Assess for incontinence   - Turn and reposition patient  - Assist with mobility/ambulation  - Relieve pressure over bony prominences  - Avoid friction and shearing  - Provide appropriate hygiene as needed including keeping skin clean and dry  - Evaluate need for skin moisturizer/barrier cream  - Collaborate with interdisciplinary team   - Patient/family teaching  - Consider wound care consult   Outcome: Progressing     Problem: Neurological Deficit  Goal: Neurological status is stable or improving  Description  Interventions:  - Monitor and assess patient's level of consciousness, motor function, sensory function, and level of assistance needed for ADLs  - Monitor and report changes from baseline  Collaborate with interdisciplinary team to initiate plan and implement interventions as ordered  - Provide and maintain a safe environment  - Consider seizure precautions  - Consider fall precautions  - Consider aspiration precautions  - Consider bleeding precautions  Outcome: Progressing     Problem: Activity Intolerance/Impaired Mobility  Goal: Mobility/activity is maintained at optimum level for patient  Description  Interventions:  - Assess and monitor patient  barriers to mobility and need for assistive/adaptive devices  - Assess patient's emotional response to limitations  - Collaborate with interdisciplinary team and initiate plans and interventions as ordered  - Encourage independent activity per ability   - Maintain proper body alignment  - Perform active/passive rom as tolerated/ordered    - Plan activities to conserve energy   - Turn patient as appropriate  Outcome: Progressing     Problem: Potential for Aspiration  Goal: Non-ventilated patient's risk of aspiration is minimized  Description  Assess and monitor vital signs, respiratory status, and labs (WBC)  Monitor for signs of aspiration (tachypnea, cough, rales, wheezing, cyanosis, fever)  - Assess and monitor patient's ability to swallow  - Place patient up in chair to eat if possible  - HOB up at 90 degrees to eat if unable to get patient up into chair   - Supervise patient during oral intake  - Instruct patient/ family to take small bites  - Instruct patient/ family to take small single sips when taking liquids  - Follow patient-specific strategies generated by speech pathologist   Outcome: Progressing     Problem: Nutrition  Goal: Nutrition/Hydration status is improving  Description  Monitor and assess patient's nutrition/hydration status for malnutrition (ex- brittle hair, bruises, dry skin, pale skin and conjunctiva, muscle wasting, smooth red tongue, and disorientation)  Collaborate with interdisciplinary team and initiate plan and interventions as ordered  Monitor patient's weight and dietary intake as ordered or per policy  Utilize nutrition screening tool and intervene per policy  Determine patient's food preferences and provide high-protein, high-caloric foods as appropriate  - Assist patient with eating   - Allow adequate time for meals   - Encourage patient to take dietary supplement as ordered  - Collaborate with clinical nutritionist   - Include patient/family/caregiver in decisions related to nutrition    Outcome: Progressing     Problem: NEUROSENSORY - ADULT  Goal: Achieves stable or improved neurological status  Description  INTERVENTIONS  - Monitor and report changes in neurological status  - Monitor vital signs such as temperature, blood pressure, glucose, and any other labs ordered   - Initiate measures to prevent increased intracranial pressure  - Monitor for seizure activity and implement precautions if appropriate Outcome: Progressing  Goal: Achieves maximal functionality and self care  Description  INTERVENTIONS  - Monitor swallowing and airway patency with patient fatigue and changes in neurological status  - Encourage and assist patient to increase activity and self care     - Encourage visually impaired, hearing impaired and aphasic patients to use assistive/communication devices  Outcome: Progressing     Problem: GENITOURINARY - ADULT  Goal: Urinary catheter remains patent  Description  INTERVENTIONS:  - Assess patency of urinary catheter  - If patient has a chronic barrera, consider changing catheter if non-functioning  - Follow guidelines for intermittent irrigation of non-functioning urinary catheter  Outcome: Progressing     Problem: METABOLIC, FLUID AND ELECTROLYTES - ADULT  Goal: Electrolytes maintained within normal limits  Description  INTERVENTIONS:  - Monitor labs and assess patient for signs and symptoms of electrolyte imbalances  - Administer electrolyte replacement as ordered  - Monitor response to electrolyte replacements, including repeat lab results as appropriate  - Instruct patient on fluid and nutrition as appropriate  Outcome: Progressing  Goal: Fluid balance maintained  Description  INTERVENTIONS:  - Monitor labs   - Monitor I/O and WT  - Instruct patient on fluid and nutrition as appropriate  - Assess for signs & symptoms of volume excess or deficit  Outcome: Progressing     Problem: SKIN/TISSUE INTEGRITY - ADULT  Goal: Skin integrity remains intact  Description  INTERVENTIONS  - Identify patients at risk for skin breakdown  - Assess and monitor skin integrity  - Assess and monitor nutrition and hydration status  - Monitor labs (i e  albumin)  - Assess for incontinence   - Turn and reposition patient  - Assist with mobility/ambulation  - Relieve pressure over bony prominences  - Avoid friction and shearing  - Provide appropriate hygiene as needed including keeping skin clean and dry  - Evaluate need for skin moisturizer/barrier cream  - Collaborate with interdisciplinary team (i e  Nutrition, Rehabilitation, etc )   - Patient/family teaching  Outcome: Progressing     Problem: MUSCULOSKELETAL - ADULT  Goal: Maintain or return mobility to safest level of function  Description  INTERVENTIONS:  - Assess patient's ability to carry out ADLs; assess patient's baseline for ADL function and identify physical deficits which impact ability to perform ADLs (bathing, care of mouth/teeth, toileting, grooming, dressing, etc )  - Assess/evaluate cause of self-care deficits   - Assess range of motion  - Assess patient's mobility  - Assess patient's need for assistive devices and provide as appropriate  - Encourage maximum independence but intervene and supervise when necessary  - Involve family in performance of ADLs  - Assess for home care needs following discharge   - Consider OT consult to assist with ADL evaluation and planning for discharge  - Provide patient education as appropriate  Outcome: Progressing     Problem: PAIN - ADULT  Goal: Verbalizes/displays adequate comfort level or baseline comfort level  Description  Interventions:  - Encourage patient to monitor pain and request assistance  - Assess pain using appropriate pain scale  - Administer analgesics based on type and severity of pain and evaluate response  - Implement non-pharmacological measures as appropriate and evaluate response  - Consider cultural and social influences on pain and pain management  - Notify physician/advanced practitioner if interventions unsuccessful or patient reports new pain  Outcome: Progressing     Problem: INFECTION - ADULT  Goal: Absence or prevention of progression during hospitalization  Description  INTERVENTIONS:  - Assess and monitor for signs and symptoms of infection  - Monitor lab/diagnostic results  - Monitor all insertion sites, i e  indwelling lines, tubes, and drains  - [place] appropriate cooling/warming therapies per order  - Administer medications as ordered  - Instruct and encourage patient and family to use good hand hygiene technique  - Identify and instruct in appropriate isolation precautions for identified infection/condition   Outcome: Progressing     Problem: SAFETY ADULT  Goal: Patient will remain free of falls  Description  INTERVENTIONS:  - Assess patient frequently for physical needs  -  Identify cognitive and physical deficits and behaviors that affect risk of falls    -  [place] fall precautions as indicated by assessment   - Educate patient/family on patient safety including physical limitations  - Instruct patient to call for assistance with activity based on assessment  - Modify environment to reduce risk of injury  - Consider OT/PT consult to assist with strengthening/mobility  Outcome: Progressing  Goal: Maintain or return to baseline ADL function  Description  INTERVENTIONS:  -  Assess patient's ability to carry out ADLs; assess patient's baseline for ADL function and identify physical deficits which impact ability to perform ADLs (bathing, care of mouth/teeth, toileting, grooming, dressing, etc )  - Assess/evaluate cause of self-care deficits   - Assess range of motion  - Assess patient's mobility; develop plan if impaired  - Assess patient's need for assistive devices and provide as appropriate  - Encourage maximum independence but intervene and supervise when necessary  - Involve family in performance of ADLs  - Assess for home care needs following discharge   - Consider OT consult to assist with ADL evaluation and planning for discharge  - Provide patient education as appropriate  Outcome: Progressing  Goal: Maintain or return mobility status to optimal level  Description  INTERVENTIONS:  - Assess patient's baseline mobility status (ambulation, transfers, stairs, etc )    - Identify cognitive and physical deficits and behaviors that affect mobility  - Identify mobility aids required to assist with transfers and/or ambulation (gait belt, sit-to-stand, lift, walker, cane, etc )  - [place] fall precautions as indicated by assessment  - Record patient progress and toleration of activity level on Mobility SBAR; progress patient to next Phase/Stage  - Instruct patient to call for assistance with activity based on assessment  - Consider rehabilitation consult to assist with strengthening/weightbearing, etc   Outcome: Progressing     Problem: DISCHARGE PLANNING  Goal: Discharge to home or other facility with appropriate resources  Description  INTERVENTIONS:  - Identify barriers to discharge w/patient and caregiver  - Arrange for needed discharge resources and transportation as appropriate  - Identify discharge learning needs (meds, wound care, etc )  - Arrange for interpretive services to assist at discharge as needed  - Refer to Case Management Department for coordinating discharge planning if the patient needs post-hospital services based on physician/advanced practitioner order or complex needs related to functional status, cognitive ability, or social support system  Outcome: Progressing     Problem: Knowledge Deficit  Goal: Patient/family/caregiver demonstrates understanding of disease process, treatment plan, medications, and discharge instructions  Description  Complete learning assessment and assess knowledge base  Interventions:  - Provide teaching at level of understanding  - Provide teaching via preferred learning methods  Outcome: Progressing     Problem: Potential for Falls  Goal: Patient will remain free of falls  Description  INTERVENTIONS:  - Assess patient frequently for physical needs  -  Identify cognitive and physical deficits and behaviors that affect risk of falls    -  [place] fall precautions as indicated by assessment   - Educate patient/family on patient safety including physical limitations  - Instruct patient to call for assistance with activity based on assessment  - Modify environment to reduce risk of injury  - Consider OT/PT consult to assist with strengthening/mobility  Outcome: Progressing     Problem: Nutrition/Hydration-ADULT  Goal: Nutrient/Hydration intake appropriate for improving, restoring or maintaining nutritional needs  Description  Monitor and assess patient's nutrition/hydration status for malnutrition  Collaborate with interdisciplinary team and initiate plan and interventions as ordered  Monitor patient's weight and dietary intake as ordered or per policy  Utilize nutrition screening tool and intervene as necessary  Determine patient's food preferences and provide high-protein, high-caloric foods as appropriate       INTERVENTIONS:  - Monitor oral intake, urinary output, labs, and treatment plans  - Assess nutrition and hydration status and recommend course of action  - Evaluate amount of meals eaten  - Assist patient with eating if necessary   - Allow adequate time for meals  - Recommend/ encourage appropriate diets, oral nutritional supplements, and vitamin/mineral supplements  - Order, calculate, and assess calorie counts as needed  - Recommend, monitor, and adjust tube feedings and TPN/PPN based on assessed needs  - Assess need for intravenous fluids  - Provide specific nutrition/hydration education as appropriate  - Include patient/family/caregiver in decisions related to nutrition  Outcome: Progressing     Problem: CONFUSION/THOUGHT DISTURBANCE  Goal: Thought disturbances (confusion, delirium, depression, dementia or psychosis) are managed to maintain or return to baseline mental status and functional level  Description  INTERVENTIONS:  - Assess for possible contributors to  thought disturbance, including but not limited to medications, infection, impaired vision or hearing, underlying metabolic abnormalities, dehydration, respiratory compromise,  psychiatric diagnoses and notify attending PHYSICAN/AP  - Monitor and intervene to maintain adequate nutrition, hydration, elimination, sleep and activity  - Decrease environmental stimuli, including noise as appropriate  - Provide frequent contacts to provide refocusing, direction and reassurance as needed  Approach patient calmly with eye contact and at their level  - [place] high risk fall precautions, aspiration precautions and other safety measures, as indicated  - If delirium suspected, notify physician/AP of change in condition and request immediate in-person evaluation  - Pursue consults as appropriate including Geriatric (campus dependent), OT for cognitive evaluation/activity planning, psychiatric, pastoral care, etc   Outcome: Progressing     Problem: BEHAVIOR  Goal: Pt/Family maintain appropriate behavior and adhere to behavioral management agreement, if implemented  Description  INTERVENTIONS:  - Assess the family dynamic   - Encourage verbalization of thoughts and concerns in a socially appropriate manner  - Assess patient/family's coping skills and non-compliant behavior (including use of illegal substances)  - Utilize positive, consistent limit setting strategies supporting safety of patient, staff and others  - Initiate consult with Case Management, Spiritual Care or other ancillary services as appropriate  - If a patient's/visitor's behavior jeopardizes the safety of the patient, staff, or others, refer to organization procedure     - Notify Security of behavior or suspected illegal substances which indicate the need for search of the patient and/or belongings  - Encourage participation in the decision making process about a behavioral management agreement; implement if patient meets criteria  Outcome: Progressing     Problem: SAFETY,RESTRAINT: NV/NON-SELF DESTRUCTIVE BEHAVIOR  Goal: Remains free of harm/injury (restraint for non violent/non self-detsructive behavior)  Description  INTERVENTIONS:  - Instruct patient/family regarding restraint use   - Assess and monitor physiologic and psychological status   - Provide interventions and comfort measures to meet assessed patient needs   - Identify and implement measures to help patient regain control  - Assess readiness for release of restraint   Outcome: Progressing  Goal: Returns to optimal restraint-free functioning  Description  INTERVENTIONS:  - Assess the patient's behavior and symptoms that indicate continued need for restraint  - Identify and implement measures to help patient regain control  - Assess readiness for release of restraint   Outcome: Progressing

## 2019-10-01 NOTE — PLAN OF CARE
Problem: PHYSICAL THERAPY ADULT  Goal: Performs mobility at highest level of function for planned discharge setting  See evaluation for individualized goals  Description  Treatment/Interventions: Functional transfer training, LE strengthening/ROM, Therapeutic exercise, Endurance training, Bed mobility, Gait training, Spoke to nursing, Spoke to case management, OT          See flowsheet documentation for full assessment, interventions and recommendations  Outcome: Progressing  Note:   Prognosis: Fair  Problem List: Decreased strength, Decreased endurance, Impaired balance, Decreased mobility, Decreased coordination, Decreased cognition, Impaired judgement, Decreased safety awareness, Pain  Assessment: Pt seen for session for setup, bed mob, time spent EOB w/ sitting activities, transfers w/ limited gait, reposiitioning p session  Pt lethargic but cooperative  Still not following many commands  Needs verbal and tactile cues for sitting balance and tolerance  Able to bear some wt B LEs   remains appropriate for rehab at d/c        Recommendation: (recommend rehab at d/c)     PT - OK to Discharge: Yes(when stable to rehab)    See flowsheet documentation for full assessment

## 2019-10-02 PROCEDURE — 99232 SBSQ HOSP IP/OBS MODERATE 35: CPT | Performed by: INTERNAL MEDICINE

## 2019-10-02 PROCEDURE — 90662 IIV NO PRSV INCREASED AG IM: CPT | Performed by: INTERNAL MEDICINE

## 2019-10-02 PROCEDURE — G0008 ADMIN INFLUENZA VIRUS VAC: HCPCS | Performed by: INTERNAL MEDICINE

## 2019-10-02 PROCEDURE — 92526 ORAL FUNCTION THERAPY: CPT

## 2019-10-02 RX ORDER — TRAZODONE HYDROCHLORIDE 50 MG/1
50 TABLET ORAL
Status: DISCONTINUED | OUTPATIENT
Start: 2019-10-02 | End: 2019-10-03

## 2019-10-02 RX ADMIN — GENTAMICIN SULFATE 0.5 INCH: 3 OINTMENT OPHTHALMIC at 16:15

## 2019-10-02 RX ADMIN — ACETAMINOPHEN 975 MG: 325 TABLET ORAL at 22:03

## 2019-10-02 RX ADMIN — INFLUENZA A VIRUS A/MICHIGAN/45/2015 X-275 (H1N1) ANTIGEN (FORMALDEHYDE INACTIVATED), INFLUENZA A VIRUS A/SINGAPORE/INFIMH-16-0019/2016 IVR-186 (H3N2) ANTIGEN (FORMALDEHYDE INACTIVATED), AND INFLUENZA B VIRUS B/MARYLAND/15/2016 BX-69A (A B/COLORADO/6/2017-LIKE VIRUS) ANTIGEN (FORMALDEHYDE INACTIVATED) 0.5 ML: 60; 60; 60 INJECTION, SUSPENSION INTRAMUSCULAR at 13:26

## 2019-10-02 RX ADMIN — NYSTATIN: 100000 POWDER TOPICAL at 07:49

## 2019-10-02 RX ADMIN — ACETAMINOPHEN 975 MG: 325 TABLET ORAL at 13:30

## 2019-10-02 RX ADMIN — SODIUM CHLORIDE 100 ML/HR: 0.9 INJECTION, SOLUTION INTRAVENOUS at 03:24

## 2019-10-02 RX ADMIN — TRAZODONE HYDROCHLORIDE 50 MG: 50 TABLET ORAL at 22:03

## 2019-10-02 RX ADMIN — ACETAMINOPHEN 975 MG: 325 TABLET ORAL at 05:19

## 2019-10-02 RX ADMIN — SODIUM CHLORIDE 75 ML/HR: 0.9 INJECTION, SOLUTION INTRAVENOUS at 13:32

## 2019-10-02 RX ADMIN — DIVALPROEX SODIUM 500 MG: 125 CAPSULE, COATED PELLETS ORAL at 16:15

## 2019-10-02 RX ADMIN — NYSTATIN: 100000 POWDER TOPICAL at 16:15

## 2019-10-02 RX ADMIN — GENTAMICIN SULFATE 0.5 INCH: 3 OINTMENT OPHTHALMIC at 07:49

## 2019-10-02 RX ADMIN — AMLODIPINE BESYLATE 5 MG: 5 TABLET ORAL at 07:49

## 2019-10-02 NOTE — PROGRESS NOTES
Patient was asleep the whole day, skipped his dinner  At this time, patient wakes up, follows commands, and answers questions  Patient ate and finished 75% of his dinner tray  Medications given as prescribed   Will continue to monitor

## 2019-10-02 NOTE — PLAN OF CARE
Continue dysphagia level 2 diet and thin liquids-family present and confirmed pt's baseline diet  No further skilled ST services warranted at this time

## 2019-10-02 NOTE — ASSESSMENT & PLAN NOTE
Patient is excessively sleepy during the day but does wake up in the evenings and is appropriate  supect sleep wake cycle dysfunction  Not improved with melatonin  Will add trazodone at bedtime

## 2019-10-02 NOTE — ASSESSMENT & PLAN NOTE
The patient has had Barrera catheter placed twice as he has failed voiding trials  Continue barrera catheter for now until more mobile and alert

## 2019-10-02 NOTE — PROGRESS NOTES
Progress Note Dulce Tay 1/30/1924, 80 y o  male MRN: [id number]    [facility] Encounter: [id number]    Primary Care Provider: Jovani Menon DO   Date and time admitted to hospital: 9/22/2019  6:37 PM        * SAH (subarachnoid hemorrhage) ([facility] [address] )  Assessment & Plan  NO AP or AC  MRI and CT head shows stable hemorrhage  Continue PT/OT  Fall precaution    Encephalopathy  Assessment & Plan  Patient is excessively sleepy during the day but does wake up in the evenings and is appropriate  supect sleep wake cycle dysfunction  Not improved with melatonin  Will add trazodone at bedtime      Urinary retention  Assessment & Plan  The patient has had Barrera catheter placed twice as he has failed voiding trials  Continue barrera catheter for now until more mobile and alert    CKD (chronic kidney disease) stage 3, GFR 30-59 ml/min ([facility])  Assessment & Plan  Unknown baseline renal function, continue to monitor BMP    [facility]  Assessment & Plan  Oral intake has been poor  Continue IV hydration  Monitor BUN creatinine  Avoid nephrotoxin agents    A-fib ([facility])  Assessment & Plan  Currently sinus  No rate control medicines needed  No AC due to bleed    Coronary artery disease involving native coronary artery of native heart without angina pectoris  Assessment & Plan  S/p remote BMS  Pt on no meds at home    Essential hypertension  Assessment & Plan  Continue Norvasc  Goal SBP < 140  Labetalol prn        VTE Pharmacologic Prophylaxis:   Pharmacologic: Heparin  Mechanical VTE Prophylaxis in Place: Yes    Patient Centered Rounds: I have performed bedside rounds with nursing staff today  Education and Discussions with Family / Patient:  Patient and family, plan of care    Time Spent for Care: 20 minutes  More than 50% of total time spent on counseling and coordination of care as described above      Current Length of Stay: 10 day(s)    Current Patient Status: Inpatient   Certification Statement: The patient will continue to require additional inpatient hospital stay due to Continue IV hydration, BMP    Discharge Plan:  SNF when stable    Code Status: Level 3 - DNAR and DNI      Subjective:   Denies any acute complaints  Patient was somnolent for the majority of the day and has recently woken up and had his dinner  Objective:     Vitals:   Temp (24hrs), Av 3 °F (36 3 °C), Min:96 6 °F (35 9 °C), Max:98 1 °F (36 7 °C)    Temp:  [96 6 °F (35 9 °C)-98 1 °F (36 7 °C)] 96 6 °F (35 9 °C)  HR:  [54-66] 60  Resp:  [17] 17  BP: ()/(48-79) 98/65  SpO2:  [94 %-100 %] 100 %  Body mass index is 23 6 kg/m²  Input and Output Summary (last 24 hours): Intake/Output Summary (Last 24 hours) at 10/2/2019 1825  Last data filed at 10/2/2019 1813  Gross per 24 hour   Intake 3355 83 ml   Output 1100 ml   Net 2255 83 ml       Physical Exam:     Physical Exam   Constitutional: He is oriented to person, place, and time  Thin elderly male, lying in bed   HENT:   Head: Normocephalic and atraumatic  Eyes: Pupils are equal, round, and reactive to light  EOM are normal    Neck: Neck supple  Cardiovascular: Normal rate and regular rhythm  Pulmonary/Chest: Effort normal    Abdominal: Soft  Musculoskeletal: Normal range of motion  He exhibits no edema  Neurological: He is alert and oriented to person, place, and time  Skin: Skin is warm and dry  Additional Data:     Labs:        Results from last 7 days   Lab Units 10/01/19  1819   SODIUM mmol/L 138   POTASSIUM mmol/L 4 7   CHLORIDE mmol/L 107   CO2 mmol/L 24   BUN mg/dL 46*   CREATININE mg/dL 1 99*   ANION GAP mmol/L 7   CALCIUM mg/dL 9 2   GLUCOSE RANDOM mg/dL 89         Results from last 7 days   Lab Units 19  1104   POC GLUCOSE mg/dl 120                   * I Have Reviewed All Lab Data Listed Above  * Additional Pertinent Lab Tests Reviewed:  All Labs Within Last 24 Hours Reviewed      Recent Cultures (last 7 days):           Last 24 Hours Medication List:     Current Facility-Administered Medications:  acetaminophen 975 mg Oral [facility] Tuesday M MARC Moreno    amLODIPine 5 mg Oral Daily Aryan Kiran DO    divalproex sodium 500 mg Oral Daily Kd De Leon MD    gentamicin 0 5 inch Both Eyes BID Kd De Leon MD    Labetalol HCl 5 mg Intravenous Q4H PRN Jorge Baum MD    nystatin  Topical BID Levchapito Padilla DO    sodium chloride 75 mL/hr Intravenous Continuous Kd De Leon MD Last Rate: 75 mL/hr (10/02/19 1332)   traZODone 50 mg Oral HS Kd De Leon MD         Today, Patient Was Seen By: Edyta Sauceda MD    ** Please Note: Dictation voice to text software may have been used in the creation of this document   **

## 2019-10-02 NOTE — SPEECH THERAPY NOTE
Speech Language/Pathology    Speech/Language Pathology Progress Note    Patient Name: Cliff Daniels  CSRKJ'W Date: 10/2/2019     Problem List  Principal Problem:    SAH (subarachnoid hemorrhage) ([facility] [address] )  Active Problems:    Essential hypertension    Coronary artery disease involving native coronary artery of native heart without angina pectoris    A-fib (HCC)    Encephalopathy    FRANCOIS (acute kidney injury) ([facility] [address] )    CKD (chronic kidney disease) stage 3, GFR 30-59 ml/min ([facility] [address] )    Urinary retention       Past Medical History  Past Medical History:   Diagnosis Date    Arthritis     Athscl heart disease of native coronary artery w/o ang pctrs     Coronary angioplasty status     HTN (hypertension)     Hx of echocardiogram 04/06/2012    EF 0 65, Normal LV function   Hyperlipidemia     Right bundle branch block     Ventricular premature depolarization         Past Surgical History  Past Surgical History:   Procedure Laterality Date    CARDIAC CATHETERIZATION  04/04/2012    Single vessel CAD; Successful bare metal stent placed in the distal RCA       Subjective:  Pt awake with son and daughter in law present  "More ice cream "    Objective:  Pt seen for follow up dysphagia session where pt initially asleep and difficult to wake during SLP's first attempt to see pt  During second attempt, pt's son and daughter in law present, confirming that current diet level of dysphagia 2 is consistent to textures that they have been preparing at home due to pt's nearly edentulous state  Pt took small amounts of level 2 items (cereal, cracker), ice cream and ~2oz thin liquids via straw sips during session  Demonstrated effective oral and pharyngeal swallow skills w/o increased overt s/s penetration/aspiration given level 2 items and thin liquids  Pt and pt's family in agreement to remain on current diet as this is close to pt's baseline   Educated family on observing for any changes in swallow function to include difficulty chewing, holding food in mouth, coughing or increased throat clearing to which they were receptive and will carryover when pt is discharged to next facility (son reports occurring tomorrow)  Regarding cognition and speech, family reporting pt to also be at baseline  Assessment:  Pt is tolerating baseline diet of dysphagia level 2 textures and thin liquids w/o increased oral or pharyngeal difficulties or overt s/s aspiration  Plan/Recommendations:  Continue current diet of dysphagia level 2 and thin liquids  Supervision with meals and assist as needed  No further skilled ST services warranted at this time

## 2019-10-02 NOTE — PLAN OF CARE
Problem: Prexisting or High Potential for Compromised Skin Integrity  Goal: Skin integrity is maintained or improved  Description  INTERVENTIONS:  - Identify patients at risk for skin breakdown  - Assess and monitor skin integrity  - Assess and monitor nutrition and hydration status  - Monitor labs   - Assess for incontinence   - Turn and reposition patient  - Assist with mobility/ambulation  - Relieve pressure over bony prominences  - Avoid friction and shearing  - Provide appropriate hygiene as needed including keeping skin clean and dry  - Evaluate need for skin moisturizer/barrier cream  - Collaborate with interdisciplinary team   - Patient/family teaching  - Consider wound care consult   Outcome: Progressing     Problem: Neurological Deficit  Goal: Neurological status is stable or improving  Description  Interventions:  - Monitor and assess patient's level of consciousness, motor function, sensory function, and level of assistance needed for ADLs  - Monitor and report changes from baseline  Collaborate with interdisciplinary team to initiate plan and implement interventions as ordered  - Provide and maintain a safe environment  - Consider seizure precautions  - Consider fall precautions  - Consider aspiration precautions  - Consider bleeding precautions  Outcome: Progressing     Problem: Activity Intolerance/Impaired Mobility  Goal: Mobility/activity is maintained at optimum level for patient  Description  Interventions:  - Assess and monitor patient  barriers to mobility and need for assistive/adaptive devices  - Assess patient's emotional response to limitations  - Collaborate with interdisciplinary team and initiate plans and interventions as ordered  - Encourage independent activity per ability   - Maintain proper body alignment  - Perform active/passive rom as tolerated/ordered    - Plan activities to conserve energy   - Turn patient as appropriate  Outcome: Progressing     Problem: Potential for Aspiration  Goal: Non-ventilated patient's risk of aspiration is minimized  Description  Assess and monitor vital signs, respiratory status, and labs (WBC)  Monitor for signs of aspiration (tachypnea, cough, rales, wheezing, cyanosis, fever)  - Assess and monitor patient's ability to swallow  - Place patient up in chair to eat if possible  - HOB up at 90 degrees to eat if unable to get patient up into chair   - Supervise patient during oral intake  - Instruct patient/ family to take small bites  - Instruct patient/ family to take small single sips when taking liquids  - Follow patient-specific strategies generated by speech pathologist   Outcome: Progressing     Problem: Nutrition  Goal: Nutrition/Hydration status is improving  Description  Monitor and assess patient's nutrition/hydration status for malnutrition (ex- brittle hair, bruises, dry skin, pale skin and conjunctiva, muscle wasting, smooth red tongue, and disorientation)  Collaborate with interdisciplinary team and initiate plan and interventions as ordered  Monitor patient's weight and dietary intake as ordered or per policy  Utilize nutrition screening tool and intervene per policy  Determine patient's food preferences and provide high-protein, high-caloric foods as appropriate  - Assist patient with eating   - Allow adequate time for meals   - Encourage patient to take dietary supplement as ordered  - Collaborate with clinical nutritionist   - Include patient/family/caregiver in decisions related to nutrition    Outcome: Progressing     Problem: NEUROSENSORY - ADULT  Goal: Achieves stable or improved neurological status  Description  INTERVENTIONS  - Monitor and report changes in neurological status  - Monitor vital signs such as temperature, blood pressure, glucose, and any other labs ordered   - Initiate measures to prevent increased intracranial pressure  - Monitor for seizure activity and implement precautions if appropriate Outcome: Progressing  Goal: Achieves maximal functionality and self care  Description  INTERVENTIONS  - Monitor swallowing and airway patency with patient fatigue and changes in neurological status  - Encourage and assist patient to increase activity and self care     - Encourage visually impaired, hearing impaired and aphasic patients to use assistive/communication devices  Outcome: Progressing     Problem: GENITOURINARY - ADULT  Goal: Urinary catheter remains patent  Description  INTERVENTIONS:  - Assess patency of urinary catheter  - If patient has a chronic barrera, consider changing catheter if non-functioning  - Follow guidelines for intermittent irrigation of non-functioning urinary catheter  Outcome: Progressing     Problem: METABOLIC, FLUID AND ELECTROLYTES - ADULT  Goal: Electrolytes maintained within normal limits  Description  INTERVENTIONS:  - Monitor labs and assess patient for signs and symptoms of electrolyte imbalances  - Administer electrolyte replacement as ordered  - Monitor response to electrolyte replacements, including repeat lab results as appropriate  - Instruct patient on fluid and nutrition as appropriate  Outcome: Progressing  Goal: Fluid balance maintained  Description  INTERVENTIONS:  - Monitor labs   - Monitor I/O and WT  - Instruct patient on fluid and nutrition as appropriate  - Assess for signs & symptoms of volume excess or deficit  Outcome: Progressing     Problem: SKIN/TISSUE INTEGRITY - ADULT  Goal: Skin integrity remains intact  Description  INTERVENTIONS  - Identify patients at risk for skin breakdown  - Assess and monitor skin integrity  - Assess and monitor nutrition and hydration status  - Monitor labs (i e  albumin)  - Assess for incontinence   - Turn and reposition patient  - Assist with mobility/ambulation  - Relieve pressure over bony prominences  - Avoid friction and shearing  - Provide appropriate hygiene as needed including keeping skin clean and dry  - Evaluate need for skin moisturizer/barrier cream  - Collaborate with interdisciplinary team (i e  Nutrition, Rehabilitation, etc )   - Patient/family teaching  Outcome: Progressing     Problem: MUSCULOSKELETAL - ADULT  Goal: Maintain or return mobility to safest level of function  Description  INTERVENTIONS:  - Assess patient's ability to carry out ADLs; assess patient's baseline for ADL function and identify physical deficits which impact ability to perform ADLs (bathing, care of mouth/teeth, toileting, grooming, dressing, etc )  - Assess/evaluate cause of self-care deficits   - Assess range of motion  - Assess patient's mobility  - Assess patient's need for assistive devices and provide as appropriate  - Encourage maximum independence but intervene and supervise when necessary  - Involve family in performance of ADLs  - Assess for home care needs following discharge   - Consider OT consult to assist with ADL evaluation and planning for discharge  - Provide patient education as appropriate  Outcome: Progressing     Problem: PAIN - ADULT  Goal: Verbalizes/displays adequate comfort level or baseline comfort level  Description  Interventions:  - Encourage patient to monitor pain and request assistance  - Assess pain using appropriate pain scale  - Administer analgesics based on type and severity of pain and evaluate response  - Implement non-pharmacological measures as appropriate and evaluate response  - Consider cultural and social influences on pain and pain management  - Notify physician/advanced practitioner if interventions unsuccessful or patient reports new pain  Outcome: Progressing     Problem: INFECTION - ADULT  Goal: Absence or prevention of progression during hospitalization  Description  INTERVENTIONS:  - Assess and monitor for signs and symptoms of infection  - Monitor lab/diagnostic results  - Monitor all insertion sites, i e  indwelling lines, tubes, and drains  - [place] appropriate cooling/warming therapies per order  - Administer medications as ordered  - Instruct and encourage patient and family to use good hand hygiene technique  - Identify and instruct in appropriate isolation precautions for identified infection/condition   Outcome: Progressing     Problem: SAFETY ADULT  Goal: Patient will remain free of falls  Description  INTERVENTIONS:  - Assess patient frequently for physical needs  -  Identify cognitive and physical deficits and behaviors that affect risk of falls    -  [place] fall precautions as indicated by assessment   - Educate patient/family on patient safety including physical limitations  - Instruct patient to call for assistance with activity based on assessment  - Modify environment to reduce risk of injury  - Consider OT/PT consult to assist with strengthening/mobility  Outcome: Progressing  Goal: Maintain or return to baseline ADL function  Description  INTERVENTIONS:  -  Assess patient's ability to carry out ADLs; assess patient's baseline for ADL function and identify physical deficits which impact ability to perform ADLs (bathing, care of mouth/teeth, toileting, grooming, dressing, etc )  - Assess/evaluate cause of self-care deficits   - Assess range of motion  - Assess patient's mobility; develop plan if impaired  - Assess patient's need for assistive devices and provide as appropriate  - Encourage maximum independence but intervene and supervise when necessary  - Involve family in performance of ADLs  - Assess for home care needs following discharge   - Consider OT consult to assist with ADL evaluation and planning for discharge  - Provide patient education as appropriate  Outcome: Progressing  Goal: Maintain or return mobility status to optimal level  Description  INTERVENTIONS:  - Assess patient's baseline mobility status (ambulation, transfers, stairs, etc )    - Identify cognitive and physical deficits and behaviors that affect mobility  - Identify mobility aids required to assist with transfers and/or ambulation (gait belt, sit-to-stand, lift, walker, cane, etc )  - [place] fall precautions as indicated by assessment  - Record patient progress and toleration of activity level on Mobility SBAR; progress patient to next Phase/Stage  - Instruct patient to call for assistance with activity based on assessment  - Consider rehabilitation consult to assist with strengthening/weightbearing, etc   Outcome: Progressing     Problem: DISCHARGE PLANNING  Goal: Discharge to home or other facility with appropriate resources  Description  INTERVENTIONS:  - Identify barriers to discharge w/patient and caregiver  - Arrange for needed discharge resources and transportation as appropriate  - Identify discharge learning needs (meds, wound care, etc )  - Arrange for interpretive services to assist at discharge as needed  - Refer to Case Management Department for coordinating discharge planning if the patient needs post-hospital services based on physician/advanced practitioner order or complex needs related to functional status, cognitive ability, or social support system  Outcome: Progressing     Problem: Knowledge Deficit  Goal: Patient/family/caregiver demonstrates understanding of disease process, treatment plan, medications, and discharge instructions  Description  Complete learning assessment and assess knowledge base  Interventions:  - Provide teaching at level of understanding  - Provide teaching via preferred learning methods  Outcome: Progressing     Problem: Potential for Falls  Goal: Patient will remain free of falls  Description  INTERVENTIONS:  - Assess patient frequently for physical needs  -  Identify cognitive and physical deficits and behaviors that affect risk of falls    -  [place] fall precautions as indicated by assessment   - Educate patient/family on patient safety including physical limitations  - Instruct patient to call for assistance with activity based on assessment  - Modify environment to reduce risk of injury  - Consider OT/PT consult to assist with strengthening/mobility  Outcome: Progressing     Problem: Nutrition/Hydration-ADULT  Goal: Nutrient/Hydration intake appropriate for improving, restoring or maintaining nutritional needs  Description  Monitor and assess patient's nutrition/hydration status for malnutrition  Collaborate with interdisciplinary team and initiate plan and interventions as ordered  Monitor patient's weight and dietary intake as ordered or per policy  Utilize nutrition screening tool and intervene as necessary  Determine patient's food preferences and provide high-protein, high-caloric foods as appropriate       INTERVENTIONS:  - Monitor oral intake, urinary output, labs, and treatment plans  - Assess nutrition and hydration status and recommend course of action  - Evaluate amount of meals eaten  - Assist patient with eating if necessary   - Allow adequate time for meals  - Recommend/ encourage appropriate diets, oral nutritional supplements, and vitamin/mineral supplements  - Order, calculate, and assess calorie counts as needed  - Recommend, monitor, and adjust tube feedings and TPN/PPN based on assessed needs  - Assess need for intravenous fluids  - Provide specific nutrition/hydration education as appropriate  - Include patient/family/caregiver in decisions related to nutrition  Outcome: Progressing     Problem: CONFUSION/THOUGHT DISTURBANCE  Goal: Thought disturbances (confusion, delirium, depression, dementia or psychosis) are managed to maintain or return to baseline mental status and functional level  Description  INTERVENTIONS:  - Assess for possible contributors to  thought disturbance, including but not limited to medications, infection, impaired vision or hearing, underlying metabolic abnormalities, dehydration, respiratory compromise,  psychiatric diagnoses and notify attending PHYSICAN/AP  - Monitor and intervene to maintain adequate nutrition, hydration, elimination, sleep and activity  - Decrease environmental stimuli, including noise as appropriate  - Provide frequent contacts to provide refocusing, direction and reassurance as needed  Approach patient calmly with eye contact and at their level  - [place] high risk fall precautions, aspiration precautions and other safety measures, as indicated  - If delirium suspected, notify physician/AP of change in condition and request immediate in-person evaluation  - Pursue consults as appropriate including Geriatric (campus dependent), OT for cognitive evaluation/activity planning, psychiatric, pastoral care, etc   Outcome: Progressing     Problem: BEHAVIOR  Goal: Pt/Family maintain appropriate behavior and adhere to behavioral management agreement, if implemented  Description  INTERVENTIONS:  - Assess the family dynamic   - Encourage verbalization of thoughts and concerns in a socially appropriate manner  - Assess patient/family's coping skills and non-compliant behavior (including use of illegal substances)  - Utilize positive, consistent limit setting strategies supporting safety of patient, staff and others  - Initiate consult with Case Management, Spiritual Care or other ancillary services as appropriate  - If a patient's/visitor's behavior jeopardizes the safety of the patient, staff, or others, refer to organization procedure     - Notify Security of behavior or suspected illegal substances which indicate the need for search of the patient and/or belongings  - Encourage participation in the decision making process about a behavioral management agreement; implement if patient meets criteria  Outcome: Progressing

## 2019-10-03 LAB
ANION GAP SERPL CALCULATED.3IONS-SCNC: 7 MMOL/L (ref 4–13)
BUN SERPL-MCNC: 49 MG/DL (ref 5–25)
CALCIUM SERPL-MCNC: 8.7 MG/DL (ref 8.3–10.1)
CHLORIDE SERPL-SCNC: 110 MMOL/L (ref 100–108)
CO2 SERPL-SCNC: 24 MMOL/L (ref 21–32)
CREAT SERPL-MCNC: 1.63 MG/DL (ref 0.6–1.3)
GFR SERPL CREATININE-BSD FRML MDRD: 35 ML/MIN/1.73SQ M
GLUCOSE SERPL-MCNC: 84 MG/DL (ref 65–140)
POTASSIUM SERPL-SCNC: 4.5 MMOL/L (ref 3.5–5.3)
SODIUM SERPL-SCNC: 141 MMOL/L (ref 136–145)

## 2019-10-03 PROCEDURE — 99232 SBSQ HOSP IP/OBS MODERATE 35: CPT | Performed by: INTERNAL MEDICINE

## 2019-10-03 PROCEDURE — 80048 BASIC METABOLIC PNL TOTAL CA: CPT | Performed by: INTERNAL MEDICINE

## 2019-10-03 RX ORDER — OLANZAPINE 5 MG/1
5 TABLET, ORALLY DISINTEGRATING ORAL
Status: DISCONTINUED | OUTPATIENT
Start: 2019-10-03 | End: 2019-10-04

## 2019-10-03 RX ORDER — DOCUSATE SODIUM 100 MG/1
100 CAPSULE, LIQUID FILLED ORAL 2 TIMES DAILY
Status: DISCONTINUED | OUTPATIENT
Start: 2019-10-03 | End: 2019-10-12 | Stop reason: HOSPADM

## 2019-10-03 RX ADMIN — NYSTATIN: 100000 POWDER TOPICAL at 09:00

## 2019-10-03 RX ADMIN — ACETAMINOPHEN 975 MG: 325 TABLET ORAL at 06:02

## 2019-10-03 RX ADMIN — ACETAMINOPHEN 975 MG: 325 TABLET ORAL at 12:39

## 2019-10-03 RX ADMIN — NYSTATIN: 100000 POWDER TOPICAL at 17:33

## 2019-10-03 RX ADMIN — ACETAMINOPHEN 975 MG: 325 TABLET ORAL at 21:00

## 2019-10-03 RX ADMIN — GENTAMICIN SULFATE 0.5 INCH: 3 OINTMENT OPHTHALMIC at 09:01

## 2019-10-03 RX ADMIN — GENTAMICIN SULFATE 0.5 INCH: 3 OINTMENT OPHTHALMIC at 17:34

## 2019-10-03 RX ADMIN — BISACODYL 10 MG: 5 TABLET, COATED ORAL at 21:01

## 2019-10-03 RX ADMIN — DIVALPROEX SODIUM 500 MG: 125 CAPSULE, COATED PELLETS ORAL at 17:34

## 2019-10-03 RX ADMIN — SODIUM CHLORIDE 75 ML/HR: 0.9 INJECTION, SOLUTION INTRAVENOUS at 03:22

## 2019-10-03 RX ADMIN — OLANZAPINE 5 MG: 5 TABLET, ORALLY DISINTEGRATING ORAL at 21:01

## 2019-10-03 RX ADMIN — AMLODIPINE BESYLATE 5 MG: 5 TABLET ORAL at 09:00

## 2019-10-03 NOTE — PLAN OF CARE
Problem: Prexisting or High Potential for Compromised Skin Integrity  Goal: Skin integrity is maintained or improved  Description  INTERVENTIONS:  - Identify patients at risk for skin breakdown  - Assess and monitor skin integrity  - Assess and monitor nutrition and hydration status  - Monitor labs   - Assess for incontinence   - Turn and reposition patient  - Assist with mobility/ambulation  - Relieve pressure over bony prominences  - Avoid friction and shearing  - Provide appropriate hygiene as needed including keeping skin clean and dry  - Evaluate need for skin moisturizer/barrier cream  - Collaborate with interdisciplinary team   - Patient/family teaching  - Consider wound care consult   Outcome: Progressing     Problem: Neurological Deficit  Goal: Neurological status is stable or improving  Description  Interventions:  - Monitor and assess patient's level of consciousness, motor function, sensory function, and level of assistance needed for ADLs  - Monitor and report changes from baseline  Collaborate with interdisciplinary team to initiate plan and implement interventions as ordered  - Provide and maintain a safe environment  - Consider seizure precautions  - Consider fall precautions  - Consider aspiration precautions  - Consider bleeding precautions  Outcome: Progressing     Problem: Activity Intolerance/Impaired Mobility  Goal: Mobility/activity is maintained at optimum level for patient  Description  Interventions:  - Assess and monitor patient  barriers to mobility and need for assistive/adaptive devices  - Assess patient's emotional response to limitations  - Collaborate with interdisciplinary team and initiate plans and interventions as ordered  - Encourage independent activity per ability   - Maintain proper body alignment  - Perform active/passive rom as tolerated/ordered    - Plan activities to conserve energy   - Turn patient as appropriate  Outcome: Progressing     Problem: Potential for Aspiration  Goal: Non-ventilated patient's risk of aspiration is minimized  Description  Assess and monitor vital signs, respiratory status, and labs (WBC)  Monitor for signs of aspiration (tachypnea, cough, rales, wheezing, cyanosis, fever)  - Assess and monitor patient's ability to swallow  - Place patient up in chair to eat if possible  - HOB up at 90 degrees to eat if unable to get patient up into chair   - Supervise patient during oral intake  - Instruct patient/ family to take small bites  - Instruct patient/ family to take small single sips when taking liquids  - Follow patient-specific strategies generated by speech pathologist   Outcome: Progressing     Problem: Nutrition  Goal: Nutrition/Hydration status is improving  Description  Monitor and assess patient's nutrition/hydration status for malnutrition (ex- brittle hair, bruises, dry skin, pale skin and conjunctiva, muscle wasting, smooth red tongue, and disorientation)  Collaborate with interdisciplinary team and initiate plan and interventions as ordered  Monitor patient's weight and dietary intake as ordered or per policy  Utilize nutrition screening tool and intervene per policy  Determine patient's food preferences and provide high-protein, high-caloric foods as appropriate  - Assist patient with eating   - Allow adequate time for meals   - Encourage patient to take dietary supplement as ordered  - Collaborate with clinical nutritionist   - Include patient/family/caregiver in decisions related to nutrition    Outcome: Progressing     Problem: NEUROSENSORY - ADULT  Goal: Achieves stable or improved neurological status  Description  INTERVENTIONS  - Monitor and report changes in neurological status  - Monitor vital signs such as temperature, blood pressure, glucose, and any other labs ordered   - Initiate measures to prevent increased intracranial pressure  - Monitor for seizure activity and implement precautions if appropriate Outcome: Progressing  Goal: Achieves maximal functionality and self care  Description  INTERVENTIONS  - Monitor swallowing and airway patency with patient fatigue and changes in neurological status  - Encourage and assist patient to increase activity and self care     - Encourage visually impaired, hearing impaired and aphasic patients to use assistive/communication devices  Outcome: Progressing     Problem: GENITOURINARY - ADULT  Goal: Urinary catheter remains patent  Description  INTERVENTIONS:  - Assess patency of urinary catheter  - If patient has a chronic barrera, consider changing catheter if non-functioning  - Follow guidelines for intermittent irrigation of non-functioning urinary catheter  Outcome: Progressing     Problem: METABOLIC, FLUID AND ELECTROLYTES - ADULT  Goal: Electrolytes maintained within normal limits  Description  INTERVENTIONS:  - Monitor labs and assess patient for signs and symptoms of electrolyte imbalances  - Administer electrolyte replacement as ordered  - Monitor response to electrolyte replacements, including repeat lab results as appropriate  - Instruct patient on fluid and nutrition as appropriate  Outcome: Progressing  Goal: Fluid balance maintained  Description  INTERVENTIONS:  - Monitor labs   - Monitor I/O and WT  - Instruct patient on fluid and nutrition as appropriate  - Assess for signs & symptoms of volume excess or deficit  Outcome: Progressing     Problem: SKIN/TISSUE INTEGRITY - ADULT  Goal: Skin integrity remains intact  Description  INTERVENTIONS  - Identify patients at risk for skin breakdown  - Assess and monitor skin integrity  - Assess and monitor nutrition and hydration status  - Monitor labs (i e  albumin)  - Assess for incontinence   - Turn and reposition patient  - Assist with mobility/ambulation  - Relieve pressure over bony prominences  - Avoid friction and shearing  - Provide appropriate hygiene as needed including keeping skin clean and dry  - Evaluate need for skin moisturizer/barrier cream  - Collaborate with interdisciplinary team (i e  Nutrition, Rehabilitation, etc )   - Patient/family teaching  Outcome: Progressing     Problem: MUSCULOSKELETAL - ADULT  Goal: Maintain or return mobility to safest level of function  Description  INTERVENTIONS:  - Assess patient's ability to carry out ADLs; assess patient's baseline for ADL function and identify physical deficits which impact ability to perform ADLs (bathing, care of mouth/teeth, toileting, grooming, dressing, etc )  - Assess/evaluate cause of self-care deficits   - Assess range of motion  - Assess patient's mobility  - Assess patient's need for assistive devices and provide as appropriate  - Encourage maximum independence but intervene and supervise when necessary  - Involve family in performance of ADLs  - Assess for home care needs following discharge   - Consider OT consult to assist with ADL evaluation and planning for discharge  - Provide patient education as appropriate  Outcome: Progressing     Problem: PAIN - ADULT  Goal: Verbalizes/displays adequate comfort level or baseline comfort level  Description  Interventions:  - Encourage patient to monitor pain and request assistance  - Assess pain using appropriate pain scale  - Administer analgesics based on type and severity of pain and evaluate response  - Implement non-pharmacological measures as appropriate and evaluate response  - Consider cultural and social influences on pain and pain management  - Notify physician/advanced practitioner if interventions unsuccessful or patient reports new pain  Outcome: Progressing     Problem: INFECTION - ADULT  Goal: Absence or prevention of progression during hospitalization  Description  INTERVENTIONS:  - Assess and monitor for signs and symptoms of infection  - Monitor lab/diagnostic results  - Monitor all insertion sites, i e  indwelling lines, tubes, and drains  - [place] appropriate cooling/warming therapies per order  - Administer medications as ordered  - Instruct and encourage patient and family to use good hand hygiene technique  - Identify and instruct in appropriate isolation precautions for identified infection/condition   Outcome: Progressing     Problem: SAFETY ADULT  Goal: Patient will remain free of falls  Description  INTERVENTIONS:  - Assess patient frequently for physical needs  -  Identify cognitive and physical deficits and behaviors that affect risk of falls    -  [place] fall precautions as indicated by assessment   - Educate patient/family on patient safety including physical limitations  - Instruct patient to call for assistance with activity based on assessment  - Modify environment to reduce risk of injury  - Consider OT/PT consult to assist with strengthening/mobility  Outcome: Progressing  Goal: Maintain or return to baseline ADL function  Description  INTERVENTIONS:  -  Assess patient's ability to carry out ADLs; assess patient's baseline for ADL function and identify physical deficits which impact ability to perform ADLs (bathing, care of mouth/teeth, toileting, grooming, dressing, etc )  - Assess/evaluate cause of self-care deficits   - Assess range of motion  - Assess patient's mobility; develop plan if impaired  - Assess patient's need for assistive devices and provide as appropriate  - Encourage maximum independence but intervene and supervise when necessary  - Involve family in performance of ADLs  - Assess for home care needs following discharge   - Consider OT consult to assist with ADL evaluation and planning for discharge  - Provide patient education as appropriate  Outcome: Progressing  Goal: Maintain or return mobility status to optimal level  Description  INTERVENTIONS:  - Assess patient's baseline mobility status (ambulation, transfers, stairs, etc )    - Identify cognitive and physical deficits and behaviors that affect mobility  - Identify mobility aids required to assist with transfers and/or ambulation (gait belt, sit-to-stand, lift, walker, cane, etc )  - [place] fall precautions as indicated by assessment  - Record patient progress and toleration of activity level on Mobility SBAR; progress patient to next Phase/Stage  - Instruct patient to call for assistance with activity based on assessment  - Consider rehabilitation consult to assist with strengthening/weightbearing, etc   Outcome: Progressing     Problem: DISCHARGE PLANNING  Goal: Discharge to home or other facility with appropriate resources  Description  INTERVENTIONS:  - Identify barriers to discharge w/patient and caregiver  - Arrange for needed discharge resources and transportation as appropriate  - Identify discharge learning needs (meds, wound care, etc )  - Arrange for interpretive services to assist at discharge as needed  - Refer to Case Management Department for coordinating discharge planning if the patient needs post-hospital services based on physician/advanced practitioner order or complex needs related to functional status, cognitive ability, or social support system  Outcome: Progressing     Problem: Knowledge Deficit  Goal: Patient/family/caregiver demonstrates understanding of disease process, treatment plan, medications, and discharge instructions  Description  Complete learning assessment and assess knowledge base  Interventions:  - Provide teaching at level of understanding  - Provide teaching via preferred learning methods  Outcome: Progressing     Problem: Potential for Falls  Goal: Patient will remain free of falls  Description  INTERVENTIONS:  - Assess patient frequently for physical needs  -  Identify cognitive and physical deficits and behaviors that affect risk of falls    -  [place] fall precautions as indicated by assessment   - Educate patient/family on patient safety including physical limitations  - Instruct patient to call for assistance with activity based on assessment  - Modify environment to reduce risk of injury  - Consider OT/PT consult to assist with strengthening/mobility  Outcome: Progressing     Problem: Nutrition/Hydration-ADULT  Goal: Nutrient/Hydration intake appropriate for improving, restoring or maintaining nutritional needs  Description  Monitor and assess patient's nutrition/hydration status for malnutrition  Collaborate with interdisciplinary team and initiate plan and interventions as ordered  Monitor patient's weight and dietary intake as ordered or per policy  Utilize nutrition screening tool and intervene as necessary  Determine patient's food preferences and provide high-protein, high-caloric foods as appropriate       INTERVENTIONS:  - Monitor oral intake, urinary output, labs, and treatment plans  - Assess nutrition and hydration status and recommend course of action  - Evaluate amount of meals eaten  - Assist patient with eating if necessary   - Allow adequate time for meals  - Recommend/ encourage appropriate diets, oral nutritional supplements, and vitamin/mineral supplements  - Order, calculate, and assess calorie counts as needed  - Recommend, monitor, and adjust tube feedings and TPN/PPN based on assessed needs  - Assess need for intravenous fluids  - Provide specific nutrition/hydration education as appropriate  - Include patient/family/caregiver in decisions related to nutrition  Outcome: Progressing     Problem: CONFUSION/THOUGHT DISTURBANCE  Goal: Thought disturbances (confusion, delirium, depression, dementia or psychosis) are managed to maintain or return to baseline mental status and functional level  Description  INTERVENTIONS:  - Assess for possible contributors to  thought disturbance, including but not limited to medications, infection, impaired vision or hearing, underlying metabolic abnormalities, dehydration, respiratory compromise,  psychiatric diagnoses and notify attending PHYSICAN/AP  - Monitor and intervene to maintain adequate nutrition, hydration, elimination, sleep and activity  - Decrease environmental stimuli, including noise as appropriate  - Provide frequent contacts to provide refocusing, direction and reassurance as needed  Approach patient calmly with eye contact and at their level  - [place] high risk fall precautions, aspiration precautions and other safety measures, as indicated  - If delirium suspected, notify physician/AP of change in condition and request immediate in-person evaluation  - Pursue consults as appropriate including Geriatric (campus dependent), OT for cognitive evaluation/activity planning, psychiatric, pastoral care, etc   Outcome: Progressing     Problem: BEHAVIOR  Goal: Pt/Family maintain appropriate behavior and adhere to behavioral management agreement, if implemented  Description  INTERVENTIONS:  - Assess the family dynamic   - Encourage verbalization of thoughts and concerns in a socially appropriate manner  - Assess patient/family's coping skills and non-compliant behavior (including use of illegal substances)  - Utilize positive, consistent limit setting strategies supporting safety of patient, staff and others  - Initiate consult with Case Management, Spiritual Care or other ancillary services as appropriate  - If a patient's/visitor's behavior jeopardizes the safety of the patient, staff, or others, refer to organization procedure     - Notify Security of behavior or suspected illegal substances which indicate the need for search of the patient and/or belongings  - Encourage participation in the decision making process about a behavioral management agreement; implement if patient meets criteria  Outcome: Progressing

## 2019-10-03 NOTE — RESTORATIVE TECHNICIAN NOTE
Restorative Specialist Mobility Note                      Repositioned: Other (Comment)(Rep /sat pt upright in bed  B/L wrist restraints on  Bed alarm on   Pt callbell, phone/tray within reach )       [employer] BS, Restorative Technician, [employer]

## 2019-10-03 NOTE — ASSESSMENT & PLAN NOTE
Oral intake has improved today  Creatinine is better, likely near baseline  Discontinue IV fluids  Encourage oral intake  Monitor BMP

## 2019-10-03 NOTE — ASSESSMENT & PLAN NOTE
The patient was more combative today this morning with nursing staff but has remained awake for the day  He is awake and alert but confused and only oriented to person  Sleep/wake cycle has not been improved with low-dose and high-dose somewhat tone in and with trazodone  Spoke with the patient's son today explained to him the risks and benefits of using Zyprexa in this situation, risks including sudden cardiac death although the patient's QTC is not prolonged, there does remain some risk  He is agreeable to proceed    Starting Zyprexa 5 mg at HS

## 2019-10-03 NOTE — PROGRESS NOTES
Progress Note Davina Forte 1/30/1924, 80 y o  male MRN: [id number]    Unit/Bed#: [facility] 717-01 Encounter: [id number]    Primary Care Provider: Terence Ware DO   Date and time admitted to hospital: 9/22/2019  6:37 PM        * SAH (subarachnoid hemorrhage) [facility])  Assessment & Plan  NO AP or AC  MRI and CT head shows stable hemorrhage  Continue PT/OT  Fall precaution    Encephalopathy  Assessment & Plan  The patient was more combative today this morning with nursing staff but has remained awake for the day  He is awake and alert but confused and only oriented to person  Sleep/wake cycle has not been improved with low-dose and high-dose somewhat tone in and with trazodone  Spoke with the patient's son today explained to him the risks and benefits of using Zyprexa in this situation, risks including sudden cardiac death although the patient's QTC is not prolonged, there does remain some risk  He is agreeable to proceed    Starting Zyprexa 5 mg at [facility]      Urinary retention  Assessment & Plan  The patient has had Barrera catheter placed twice as he has failed voiding trials  Continue barrera catheter for now until more mobile and alert    CKD (chronic kidney disease) stage 3, GFR 30-59 ml/min ([facility])  Assessment & Plan  Unknown baseline renal function, continue to monitor BMP    FRANCOIS (acute kidney injury) ([facility] [address] )  Assessment & Plan  Oral intake has improved today  Creatinine is better, likely near baseline  Discontinue IV fluids  Encourage oral intake  Monitor BMP    A-fib ([facility])  Assessment & Plan  Currently sinus  No rate control medicines needed  No AC due to bleed    Coronary artery disease involving native coronary artery of native heart without angina pectoris  Assessment & Plan  S/p remote BMS  Pt on no meds at home    Essential hypertension  Assessment & Plan  Continue Norvasc  Goal SBP < 140  Labetalol prn        VTE Pharmacologic Prophylaxis:   Pharmacologic: Heparin  Mechanical VTE Prophylaxis in Place: Yes    Patient Centered Rounds: I have performed bedside rounds with nursing staff today  Education and Discussions with Family / Patient:  Patient's son, plan of care    Time Spent for Care: 20 minutes  More than 50% of total time spent on counseling and coordination of care as described above  Current Length of Stay: 11 day(s)    Current Patient Status: Inpatient   Certification Statement: The patient will continue to require additional inpatient hospital stay due to Encephalopathy, discharge planning    Discharge Plan:  SNF when stable    Code Status: Level 3 - DNAR and DNI      Subjective:   Limited secondary to confusion  The patient is awake and alert and oriented to person only  Patient engages in conversation but responses are not relevant  Denies any acute complaints  Objective:     Vitals:   Temp (24hrs), Av 6 °F (36 4 °C), Min:97 3 °F (36 3 °C), Max:98 °F (36 7 °C)    Temp:  [97 3 °F (36 3 °C)-98 °F (36 7 °C)] 98 °F (36 7 °C)  HR:  [66-79] 66  Resp:  [18] 18  BP: (119-136)/(69-73) 119/69  SpO2:  [97 %-99 %] 99 %  Body mass index is 23 25 kg/m²  Input and Output Summary (last 24 hours): Intake/Output Summary (Last 24 hours) at 10/3/2019 1817  Last data filed at 10/3/2019 1219  Gross per 24 hour   Intake 720 ml   Output 2000 ml   Net -1280 ml       Physical Exam:     Physical Exam   Constitutional:   Elderly male, lying in bed, appears comfortable   HENT:   Head: Normocephalic and atraumatic  Eyes: Pupils are equal, round, and reactive to light  EOM are normal    Neck: Neck supple  Cardiovascular: Normal rate and regular rhythm  Pulmonary/Chest: Effort normal    Abdominal: Soft  Musculoskeletal: Normal range of motion  He exhibits no edema  Neurological: He is alert  Oriented to person  Moves all 4 extremities   Skin: Skin is warm and dry           Additional Data:     Labs:        Results from last 7 days   Lab Units 10/03/19  0602   SODIUM mmol/L 141   POTASSIUM mmol/L 4 5   CHLORIDE mmol/L 110*   CO2 mmol/L 24   BUN mg/dL 49*   CREATININE mg/dL 1 63*   ANION GAP mmol/L 7   CALCIUM mg/dL 8 7   GLUCOSE RANDOM mg/dL 84         Results from last 7 days   Lab Units 09/28/19  1104   POC GLUCOSE mg/dl 120                   * I Have Reviewed All Lab Data Listed Above  * Additional Pertinent Lab Tests Reviewed: All Labs Within Last 24 Hours Reviewed      Recent Cultures (last 7 days):           Last 24 Hours Medication List:     Current Facility-Administered Medications:  acetaminophen 975 mg Oral [facility] [address] Tuesday M MARC Moreno   amLODIPine 5 mg Oral Daily Promise Lee, DO   divalproex sodium 500 mg Oral Daily Yesi Toussaint MD   gentamicin 0 5 inch Both Eyes BID Yesi Toussaint MD   Labetalol HCl 5 mg Intravenous Q4H PRN Noemi Martinez MD   nystatin  Topical BID Zuhair Pea, DO   OLANZapine 5 mg Oral HS Yesi Toussaint MD        Today, Patient Was Seen By: Ai Conde MD    ** Please Note: Dictation voice to text software may have been used in the creation of this document   **

## 2019-10-04 LAB
ANION GAP SERPL CALCULATED.3IONS-SCNC: 7 MMOL/L (ref 4–13)
BUN SERPL-MCNC: 41 MG/DL (ref 5–25)
CALCIUM SERPL-MCNC: 8.5 MG/DL (ref 8.3–10.1)
CHLORIDE SERPL-SCNC: 109 MMOL/L (ref 100–108)
CO2 SERPL-SCNC: 23 MMOL/L (ref 21–32)
CREAT SERPL-MCNC: 1.64 MG/DL (ref 0.6–1.3)
ERYTHROCYTE [DISTWIDTH] IN BLOOD BY AUTOMATED COUNT: 13 % (ref 11.6–15.1)
GFR SERPL CREATININE-BSD FRML MDRD: 35 ML/MIN/1.73SQ M
GLUCOSE SERPL-MCNC: 103 MG/DL (ref 65–140)
HCT VFR BLD AUTO: 37.5 % (ref 36.5–49.3)
HGB BLD-MCNC: 13 G/DL (ref 12–17)
MCH RBC QN AUTO: 31.2 PG (ref 26.8–34.3)
MCHC RBC AUTO-ENTMCNC: 34.7 G/DL (ref 31.4–37.4)
MCV RBC AUTO: 90 FL (ref 82–98)
PLATELET # BLD AUTO: 185 THOUSANDS/UL (ref 149–390)
PMV BLD AUTO: 10.6 FL (ref 8.9–12.7)
POTASSIUM SERPL-SCNC: 4 MMOL/L (ref 3.5–5.3)
RBC # BLD AUTO: 4.17 MILLION/UL (ref 3.88–5.62)
SODIUM SERPL-SCNC: 139 MMOL/L (ref 136–145)
WBC # BLD AUTO: 8.44 THOUSAND/UL (ref 4.31–10.16)

## 2019-10-04 PROCEDURE — 97530 THERAPEUTIC ACTIVITIES: CPT

## 2019-10-04 PROCEDURE — 80048 BASIC METABOLIC PNL TOTAL CA: CPT | Performed by: INTERNAL MEDICINE

## 2019-10-04 PROCEDURE — 97535 SELF CARE MNGMENT TRAINING: CPT

## 2019-10-04 PROCEDURE — 99232 SBSQ HOSP IP/OBS MODERATE 35: CPT | Performed by: INTERNAL MEDICINE

## 2019-10-04 PROCEDURE — 85027 COMPLETE CBC AUTOMATED: CPT | Performed by: INTERNAL MEDICINE

## 2019-10-04 RX ORDER — OLANZAPINE 10 MG/1
10 TABLET, ORALLY DISINTEGRATING ORAL
Status: DISCONTINUED | OUTPATIENT
Start: 2019-10-04 | End: 2019-10-05

## 2019-10-04 RX ADMIN — NYSTATIN: 100000 POWDER TOPICAL at 09:53

## 2019-10-04 RX ADMIN — OLANZAPINE 10 MG: 10 TABLET, ORALLY DISINTEGRATING ORAL at 21:15

## 2019-10-04 RX ADMIN — DOCUSATE SODIUM 100 MG: 100 CAPSULE, LIQUID FILLED ORAL at 03:00

## 2019-10-04 RX ADMIN — GENTAMICIN SULFATE 0.5 INCH: 3 OINTMENT OPHTHALMIC at 18:21

## 2019-10-04 RX ADMIN — ACETAMINOPHEN 975 MG: 325 TABLET ORAL at 21:15

## 2019-10-04 RX ADMIN — DIVALPROEX SODIUM 500 MG: 125 CAPSULE, COATED PELLETS ORAL at 18:20

## 2019-10-04 RX ADMIN — NYSTATIN: 100000 POWDER TOPICAL at 18:16

## 2019-10-04 RX ADMIN — GENTAMICIN SULFATE 0.5 INCH: 3 OINTMENT OPHTHALMIC at 09:57

## 2019-10-04 NOTE — PLAN OF CARE
Problem: Prexisting or High Potential for Compromised Skin Integrity  Goal: Skin integrity is maintained or improved  Description  INTERVENTIONS:  - Identify patients at risk for skin breakdown  - Assess and monitor skin integrity  - Assess and monitor nutrition and hydration status  - Monitor labs   - Assess for incontinence   - Turn and reposition patient  - Assist with mobility/ambulation  - Relieve pressure over bony prominences  - Avoid friction and shearing  - Provide appropriate hygiene as needed including keeping skin clean and dry  - Evaluate need for skin moisturizer/barrier cream  - Collaborate with interdisciplinary team   - Patient/family teaching  - Consider wound care consult   Outcome: Progressing     Problem: Neurological Deficit  Goal: Neurological status is stable or improving  Description  Interventions:  - Monitor and assess patient's level of consciousness, motor function, sensory function, and level of assistance needed for ADLs  - Monitor and report changes from baseline  Collaborate with interdisciplinary team to initiate plan and implement interventions as ordered  - Provide and maintain a safe environment  - Consider seizure precautions  - Consider fall precautions  - Consider aspiration precautions  - Consider bleeding precautions  Outcome: Progressing     Problem: Activity Intolerance/Impaired Mobility  Goal: Mobility/activity is maintained at optimum level for patient  Description  Interventions:  - Assess and monitor patient  barriers to mobility and need for assistive/adaptive devices  - Assess patient's emotional response to limitations  - Collaborate with interdisciplinary team and initiate plans and interventions as ordered  - Encourage independent activity per ability   - Maintain proper body alignment  - Perform active/passive rom as tolerated/ordered    - Plan activities to conserve energy   - Turn patient as appropriate  Outcome: Progressing     Problem: Potential for Aspiration  Goal: Non-ventilated patient's risk of aspiration is minimized  Description  Assess and monitor vital signs, respiratory status, and labs (WBC)  Monitor for signs of aspiration (tachypnea, cough, rales, wheezing, cyanosis, fever)  - Assess and monitor patient's ability to swallow  - Place patient up in chair to eat if possible  - HOB up at 90 degrees to eat if unable to get patient up into chair   - Supervise patient during oral intake  - Instruct patient/ family to take small bites  - Instruct patient/ family to take small single sips when taking liquids  - Follow patient-specific strategies generated by speech pathologist   Outcome: Progressing     Problem: Nutrition  Goal: Nutrition/Hydration status is improving  Description  Monitor and assess patient's nutrition/hydration status for malnutrition (ex- brittle hair, bruises, dry skin, pale skin and conjunctiva, muscle wasting, smooth red tongue, and disorientation)  Collaborate with interdisciplinary team and initiate plan and interventions as ordered  Monitor patient's weight and dietary intake as ordered or per policy  Utilize nutrition screening tool and intervene per policy  Determine patient's food preferences and provide high-protein, high-caloric foods as appropriate  - Assist patient with eating   - Allow adequate time for meals   - Encourage patient to take dietary supplement as ordered  - Collaborate with clinical nutritionist   - Include patient/family/caregiver in decisions related to nutrition    Outcome: Progressing     Problem: NEUROSENSORY - ADULT  Goal: Achieves stable or improved neurological status  Description  INTERVENTIONS  - Monitor and report changes in neurological status  - Monitor vital signs such as temperature, blood pressure, glucose, and any other labs ordered   - Initiate measures to prevent increased intracranial pressure  - Monitor for seizure activity and implement precautions if appropriate Outcome: Progressing  Goal: Achieves maximal functionality and self care  Description  INTERVENTIONS  - Monitor swallowing and airway patency with patient fatigue and changes in neurological status  - Encourage and assist patient to increase activity and self care     - Encourage visually impaired, hearing impaired and aphasic patients to use assistive/communication devices  Outcome: Progressing     Problem: GENITOURINARY - ADULT  Goal: Urinary catheter remains patent  Description  INTERVENTIONS:  - Assess patency of urinary catheter  - If patient has a chronic barrera, consider changing catheter if non-functioning  - Follow guidelines for intermittent irrigation of non-functioning urinary catheter  Outcome: Progressing     Problem: METABOLIC, FLUID AND ELECTROLYTES - ADULT  Goal: Electrolytes maintained within normal limits  Description  INTERVENTIONS:  - Monitor labs and assess patient for signs and symptoms of electrolyte imbalances  - Administer electrolyte replacement as ordered  - Monitor response to electrolyte replacements, including repeat lab results as appropriate  - Instruct patient on fluid and nutrition as appropriate  Outcome: Progressing  Goal: Fluid balance maintained  Description  INTERVENTIONS:  - Monitor labs   - Monitor I/O and WT  - Instruct patient on fluid and nutrition as appropriate  - Assess for signs & symptoms of volume excess or deficit  Outcome: Progressing     Problem: SKIN/TISSUE INTEGRITY - ADULT  Goal: Skin integrity remains intact  Description  INTERVENTIONS  - Identify patients at risk for skin breakdown  - Assess and monitor skin integrity  - Assess and monitor nutrition and hydration status  - Monitor labs (i e  albumin)  - Assess for incontinence   - Turn and reposition patient  - Assist with mobility/ambulation  - Relieve pressure over bony prominences  - Avoid friction and shearing  - Provide appropriate hygiene as needed including keeping skin clean and dry  - Evaluate need for skin moisturizer/barrier cream  - Collaborate with interdisciplinary team (i e  Nutrition, Rehabilitation, etc )   - Patient/family teaching  Outcome: Progressing     Problem: MUSCULOSKELETAL - ADULT  Goal: Maintain or return mobility to safest level of function  Description  INTERVENTIONS:  - Assess patient's ability to carry out ADLs; assess patient's baseline for ADL function and identify physical deficits which impact ability to perform ADLs (bathing, care of mouth/teeth, toileting, grooming, dressing, etc )  - Assess/evaluate cause of self-care deficits   - Assess range of motion  - Assess patient's mobility  - Assess patient's need for assistive devices and provide as appropriate  - Encourage maximum independence but intervene and supervise when necessary  - Involve family in performance of ADLs  - Assess for home care needs following discharge   - Consider OT consult to assist with ADL evaluation and planning for discharge  - Provide patient education as appropriate  Outcome: Progressing     Problem: PAIN - ADULT  Goal: Verbalizes/displays adequate comfort level or baseline comfort level  Description  Interventions:  - Encourage patient to monitor pain and request assistance  - Assess pain using appropriate pain scale  - Administer analgesics based on type and severity of pain and evaluate response  - Implement non-pharmacological measures as appropriate and evaluate response  - Consider cultural and social influences on pain and pain management  - Notify physician/advanced practitioner if interventions unsuccessful or patient reports new pain  Outcome: Progressing     Problem: INFECTION - ADULT  Goal: Absence or prevention of progression during hospitalization  Description  INTERVENTIONS:  - Assess and monitor for signs and symptoms of infection  - Monitor lab/diagnostic results  - Monitor all insertion sites, i e  indwelling lines, tubes, and drains  - [place] appropriate cooling/warming therapies per order  - Administer medications as ordered  - Instruct and encourage patient and family to use good hand hygiene technique  - Identify and instruct in appropriate isolation precautions for identified infection/condition   Outcome: Progressing     Problem: SAFETY ADULT  Goal: Patient will remain free of falls  Description  INTERVENTIONS:  - Assess patient frequently for physical needs  -  Identify cognitive and physical deficits and behaviors that affect risk of falls    -  [place] fall precautions as indicated by assessment   - Educate patient/family on patient safety including physical limitations  - Instruct patient to call for assistance with activity based on assessment  - Modify environment to reduce risk of injury  - Consider OT/PT consult to assist with strengthening/mobility  Outcome: Progressing  Goal: Maintain or return to baseline ADL function  Description  INTERVENTIONS:  -  Assess patient's ability to carry out ADLs; assess patient's baseline for ADL function and identify physical deficits which impact ability to perform ADLs (bathing, care of mouth/teeth, toileting, grooming, dressing, etc )  - Assess/evaluate cause of self-care deficits   - Assess range of motion  - Assess patient's mobility; develop plan if impaired  - Assess patient's need for assistive devices and provide as appropriate  - Encourage maximum independence but intervene and supervise when necessary  - Involve family in performance of ADLs  - Assess for home care needs following discharge   - Consider OT consult to assist with ADL evaluation and planning for discharge  - Provide patient education as appropriate  Outcome: Progressing  Goal: Maintain or return mobility status to optimal level  Description  INTERVENTIONS:  - Assess patient's baseline mobility status (ambulation, transfers, stairs, etc )    - Identify cognitive and physical deficits and behaviors that affect mobility  - Identify mobility aids required to assist with transfers and/or ambulation (gait belt, sit-to-stand, lift, walker, cane, etc )  - [place] fall precautions as indicated by assessment  - Record patient progress and toleration of activity level on Mobility SBAR; progress patient to next Phase/Stage  - Instruct patient to call for assistance with activity based on assessment  - Consider rehabilitation consult to assist with strengthening/weightbearing, etc   Outcome: Progressing     Problem: DISCHARGE PLANNING  Goal: Discharge to home or other facility with appropriate resources  Description  INTERVENTIONS:  - Identify barriers to discharge w/patient and caregiver  - Arrange for needed discharge resources and transportation as appropriate  - Identify discharge learning needs (meds, wound care, etc )  - Arrange for interpretive services to assist at discharge as needed  - Refer to Case Management Department for coordinating discharge planning if the patient needs post-hospital services based on physician/advanced practitioner order or complex needs related to functional status, cognitive ability, or social support system  Outcome: Progressing     Problem: Knowledge Deficit  Goal: Patient/family/caregiver demonstrates understanding of disease process, treatment plan, medications, and discharge instructions  Description  Complete learning assessment and assess knowledge base  Interventions:  - Provide teaching at level of understanding  - Provide teaching via preferred learning methods  Outcome: Progressing     Problem: Potential for Falls  Goal: Patient will remain free of falls  Description  INTERVENTIONS:  - Assess patient frequently for physical needs  -  Identify cognitive and physical deficits and behaviors that affect risk of falls    -  [place] fall precautions as indicated by assessment   - Educate patient/family on patient safety including physical limitations  - Instruct patient to call for assistance with activity based on assessment  - Modify environment to reduce risk of injury  - Consider OT/PT consult to assist with strengthening/mobility  Outcome: Progressing     Problem: Nutrition/Hydration-ADULT  Goal: Nutrient/Hydration intake appropriate for improving, restoring or maintaining nutritional needs  Description  Monitor and assess patient's nutrition/hydration status for malnutrition  Collaborate with interdisciplinary team and initiate plan and interventions as ordered  Monitor patient's weight and dietary intake as ordered or per policy  Utilize nutrition screening tool and intervene as necessary  Determine patient's food preferences and provide high-protein, high-caloric foods as appropriate       INTERVENTIONS:  - Monitor oral intake, urinary output, labs, and treatment plans  - Assess nutrition and hydration status and recommend course of action  - Evaluate amount of meals eaten  - Assist patient with eating if necessary   - Allow adequate time for meals  - Recommend/ encourage appropriate diets, oral nutritional supplements, and vitamin/mineral supplements  - Order, calculate, and assess calorie counts as needed  - Recommend, monitor, and adjust tube feedings and TPN/PPN based on assessed needs  - Assess need for intravenous fluids  - Provide specific nutrition/hydration education as appropriate  - Include patient/family/caregiver in decisions related to nutrition  Outcome: Progressing     Problem: CONFUSION/THOUGHT DISTURBANCE  Goal: Thought disturbances (confusion, delirium, depression, dementia or psychosis) are managed to maintain or return to baseline mental status and functional level  Description  INTERVENTIONS:  - Assess for possible contributors to  thought disturbance, including but not limited to medications, infection, impaired vision or hearing, underlying metabolic abnormalities, dehydration, respiratory compromise,  psychiatric diagnoses and notify attending PHYSICAN/AP  - Monitor and intervene to maintain adequate nutrition, hydration, elimination, sleep and activity  - Decrease environmental stimuli, including noise as appropriate  - Provide frequent contacts to provide refocusing, direction and reassurance as needed  Approach patient calmly with eye contact and at their level  - [place] high risk fall precautions, aspiration precautions and other safety measures, as indicated  - If delirium suspected, notify physician/AP of change in condition and request immediate in-person evaluation  - Pursue consults as appropriate including Geriatric (campus dependent), OT for cognitive evaluation/activity planning, psychiatric, pastoral care, etc   Outcome: Progressing     Problem: BEHAVIOR  Goal: Pt/Family maintain appropriate behavior and adhere to behavioral management agreement, if implemented  Description  INTERVENTIONS:  - Assess the family dynamic   - Encourage verbalization of thoughts and concerns in a socially appropriate manner  - Assess patient/family's coping skills and non-compliant behavior (including use of illegal substances)  - Utilize positive, consistent limit setting strategies supporting safety of patient, staff and others  - Initiate consult with Case Management, Spiritual Care or other ancillary services as appropriate  - If a patient's/visitor's behavior jeopardizes the safety of the patient, staff, or others, refer to organization procedure     - Notify Security of behavior or suspected illegal substances which indicate the need for search of the patient and/or belongings  - Encourage participation in the decision making process about a behavioral management agreement; implement if patient meets criteria  Outcome: Progressing

## 2019-10-04 NOTE — PROGRESS NOTES
Progress Note Jade Levy 1/30/1924, 80 y o  male MRN: [id number]    Unit/Bed#: [facility] 717-01 Encounter: [id number]    Primary Care Provider: Aman Agrawal DO   Date and time admitted to hospital: 9/22/2019  6:37 PM        * SAH (subarachnoid hemorrhage) [facility])  Assessment & Plan  NO AP or AC  MRI and CT head shows stable hemorrhage  Continue PT/OT  Fall precaution    Encephalopathy  Assessment & Plan  The patient was more combative today this morning with nursing staff but has remained awake for the day  He is awake and alert but confused and only oriented to person  Sleep/wake cycle has not been improved with low-dose and high-dose melatonin and with trazodone  Patient remained awake all night until 4:00 a m  And has been somnolent throughout the day  Will increase Zyprexa 10 mg tonight      Urinary retention  Assessment & Plan  The patient has had Barrera catheter placed twice as he has failed voiding trials  Continue barrera catheter for now until more mobile and alert    CKD (chronic kidney disease) stage 3, GFR 30-59 ml/min ([facility])  Assessment & Plan  Unknown baseline renal function, continue to monitor BMP    FRANCOIS (acute kidney injury) ([facility] [address] )  Assessment & Plan  Creatinine is stable, likely at baseline  Monitor off IV fluids at this time  Encourage oral intake  Monitor BMP    A-fib ([facility])  Assessment & Plan  Currently sinus  No rate control medicines needed  No AC due to bleed    Coronary artery disease involving native coronary artery of native heart without angina pectoris  Assessment & Plan  S/p remote BMS  Pt on no meds at home    Essential hypertension  Assessment & Plan  Continue Norvasc  Goal SBP < 140  Labetalol prn        VTE Pharmacologic Prophylaxis:   Pharmacologic: Heparin  Mechanical VTE Prophylaxis in Place: Yes    Patient Centered Rounds: I have performed bedside rounds with nursing staff today      Education and Discussions with Family / Patient: son, plan of care    Time Spent for Care: 20 minutes  More than 50% of total time spent on counseling and coordination of care as described above  Current Length of Stay: 12 day(s)    Current Patient Status: Inpatient   Certification Statement: The patient will continue to require additional inpatient hospital stay due to excessive daytime somnolence, altered sleep, wake cycle    Discharge Plan: SNF when stable    Code Status: Level 3 - DNAR and DNI      Subjective:   Limited due to somnolence    Objective:     Vitals:   Temp (24hrs), Av °F (36 1 °C), Min:97 °F (36 1 °C), Max:97 °F (36 1 °C)    Temp:  [97 °F (36 1 °C)] 97 °F (36 1 °C)  HR:  [55-81] 55  BP: ()/(53-83) 119/58  SpO2:  [97 %-100 %] 100 %  Body mass index is 23 11 kg/m²  Input and Output Summary (last 24 hours): Intake/Output Summary (Last 24 hours) at 10/4/2019 1847  Last data filed at 10/4/2019 1700  Gross per 24 hour   Intake 0 ml   Output 1825 ml   Net -1825 ml       Physical Exam:     Physical Exam   Constitutional: He appears well-developed and well-nourished  HENT:   Head: Normocephalic and atraumatic  Eyes: Pupils are equal, round, and reactive to light  EOM are normal    Neck: Neck supple  Cardiovascular: Normal rate and regular rhythm  Pulmonary/Chest: Effort normal    Abdominal: Soft  Musculoskeletal: Normal range of motion  He exhibits no edema     Neurological:   Somnolent  Arouses with noxious stimuli, quickly falls back asleep  Confused  Moving all 4 extremities         Additional Data:     Labs:    Results from last 7 days   Lab Units 10/04/19  0603   WBC Thousand/uL 8 44   HEMOGLOBIN g/dL 13 0   HEMATOCRIT % 37 5   PLATELETS Thousands/uL 185     Results from last 7 days   Lab Units 10/04/19  0603   SODIUM mmol/L 139   POTASSIUM mmol/L 4 0   CHLORIDE mmol/L 109*   CO2 mmol/L 23   BUN mg/dL 41*   CREATININE mg/dL 1 64*   ANION GAP mmol/L 7   CALCIUM mg/dL 8 5   GLUCOSE RANDOM mg/dL 103         Results from last 7 days   Lab Units 19  1104   POC GLUCOSE mg/dl 120                   * I Have Reviewed All Lab Data Listed Above  * Additional Pertinent Lab Tests Reviewed: All Labs Within Last 24 Hours Reviewed        Recent Cultures (last 7 days):           Last 24 Hours Medication List:     Current Facility-Administered Medications:  acetaminophen 975 mg Oral [facility] [address] Tuesday M MARC Moreno   amLODIPine 5 mg Oral Daily David Valders,    divalproex sodium 500 mg Oral Daily Corinne Churchill MD   docusate sodium 100 mg Oral BID Corinne Churchill MD   gentamicin 0 5 inch Both Eyes BID Corinne Churchill MD   Labetalol HCl 5 mg Intravenous Q4H PRN Mirta Bond MD   nystatin  Topical BID Nila Jain DO   OLANZapine 10 mg Oral HS Corinne Churchill MD        Today, Patient Was Seen By: Adal Lincoln MD    ** Please Note: Dictation voice to text software may have been used in the creation of this document   **

## 2019-10-04 NOTE — PLAN OF CARE
Problem: Prexisting or High Potential for Compromised Skin Integrity  Goal: Skin integrity is maintained or improved  Description  INTERVENTIONS:  - Identify patients at risk for skin breakdown  - Assess and monitor skin integrity  - Assess and monitor nutrition and hydration status  - Monitor labs   - Assess for incontinence   - Turn and reposition patient  - Assist with mobility/ambulation  - Relieve pressure over bony prominences  - Avoid friction and shearing  - Provide appropriate hygiene as needed including keeping skin clean and dry  - Evaluate need for skin moisturizer/barrier cream  - Collaborate with interdisciplinary team   - Patient/family teaching  - Consider wound care consult   Outcome: Progressing     Problem: Neurological Deficit  Goal: Neurological status is stable or improving  Description  Interventions:  - Monitor and assess patient's level of consciousness, motor function, sensory function, and level of assistance needed for ADLs  - Monitor and report changes from baseline  Collaborate with interdisciplinary team to initiate plan and implement interventions as ordered  - Provide and maintain a safe environment  - Consider seizure precautions  - Consider fall precautions  - Consider aspiration precautions  - Consider bleeding precautions  Outcome: Progressing     Problem: Activity Intolerance/Impaired Mobility  Goal: Mobility/activity is maintained at optimum level for patient  Description  Interventions:  - Assess and monitor patient  barriers to mobility and need for assistive/adaptive devices  - Assess patient's emotional response to limitations  - Collaborate with interdisciplinary team and initiate plans and interventions as ordered  - Encourage independent activity per ability   - Maintain proper body alignment  - Perform active/passive rom as tolerated/ordered    - Plan activities to conserve energy   - Turn patient as appropriate  Outcome: Progressing     Problem: Potential for Aspiration  Goal: Non-ventilated patient's risk of aspiration is minimized  Description  Assess and monitor vital signs, respiratory status, and labs (WBC)  Monitor for signs of aspiration (tachypnea, cough, rales, wheezing, cyanosis, fever)  - Assess and monitor patient's ability to swallow  - Place patient up in chair to eat if possible  - HOB up at 90 degrees to eat if unable to get patient up into chair   - Supervise patient during oral intake  - Instruct patient/ family to take small bites  - Instruct patient/ family to take small single sips when taking liquids  - Follow patient-specific strategies generated by speech pathologist   Outcome: Progressing     Problem: Nutrition  Goal: Nutrition/Hydration status is improving  Description  Monitor and assess patient's nutrition/hydration status for malnutrition (ex- brittle hair, bruises, dry skin, pale skin and conjunctiva, muscle wasting, smooth red tongue, and disorientation)  Collaborate with interdisciplinary team and initiate plan and interventions as ordered  Monitor patient's weight and dietary intake as ordered or per policy  Utilize nutrition screening tool and intervene per policy  Determine patient's food preferences and provide high-protein, high-caloric foods as appropriate  - Assist patient with eating   - Allow adequate time for meals   - Encourage patient to take dietary supplement as ordered  - Collaborate with clinical nutritionist   - Include patient/family/caregiver in decisions related to nutrition    Outcome: Progressing     Problem: NEUROSENSORY - ADULT  Goal: Achieves stable or improved neurological status  Description  INTERVENTIONS  - Monitor and report changes in neurological status  - Monitor vital signs such as temperature, blood pressure, glucose, and any other labs ordered   - Initiate measures to prevent increased intracranial pressure  - Monitor for seizure activity and implement precautions if appropriate Outcome: Progressing  Goal: Achieves maximal functionality and self care  Description  INTERVENTIONS  - Monitor swallowing and airway patency with patient fatigue and changes in neurological status  - Encourage and assist patient to increase activity and self care     - Encourage visually impaired, hearing impaired and aphasic patients to use assistive/communication devices  Outcome: Progressing     Problem: GENITOURINARY - ADULT  Goal: Urinary catheter remains patent  Description  INTERVENTIONS:  - Assess patency of urinary catheter  - If patient has a chronic barrera, consider changing catheter if non-functioning  - Follow guidelines for intermittent irrigation of non-functioning urinary catheter  Outcome: Progressing     Problem: METABOLIC, FLUID AND ELECTROLYTES - ADULT  Goal: Electrolytes maintained within normal limits  Description  INTERVENTIONS:  - Monitor labs and assess patient for signs and symptoms of electrolyte imbalances  - Administer electrolyte replacement as ordered  - Monitor response to electrolyte replacements, including repeat lab results as appropriate  - Instruct patient on fluid and nutrition as appropriate  Outcome: Progressing  Goal: Fluid balance maintained  Description  INTERVENTIONS:  - Monitor labs   - Monitor I/O and WT  - Instruct patient on fluid and nutrition as appropriate  - Assess for signs & symptoms of volume excess or deficit  Outcome: Progressing     Problem: SKIN/TISSUE INTEGRITY - ADULT  Goal: Skin integrity remains intact  Description  INTERVENTIONS  - Identify patients at risk for skin breakdown  - Assess and monitor skin integrity  - Assess and monitor nutrition and hydration status  - Monitor labs (i e  albumin)  - Assess for incontinence   - Turn and reposition patient  - Assist with mobility/ambulation  - Relieve pressure over bony prominences  - Avoid friction and shearing  - Provide appropriate hygiene as needed including keeping skin clean and dry  - Evaluate need for skin moisturizer/barrier cream  - Collaborate with interdisciplinary team (i e  Nutrition, Rehabilitation, etc )   - Patient/family teaching  Outcome: Progressing     Problem: MUSCULOSKELETAL - ADULT  Goal: Maintain or return mobility to safest level of function  Description  INTERVENTIONS:  - Assess patient's ability to carry out ADLs; assess patient's baseline for ADL function and identify physical deficits which impact ability to perform ADLs (bathing, care of mouth/teeth, toileting, grooming, dressing, etc )  - Assess/evaluate cause of self-care deficits   - Assess range of motion  - Assess patient's mobility  - Assess patient's need for assistive devices and provide as appropriate  - Encourage maximum independence but intervene and supervise when necessary  - Involve family in performance of ADLs  - Assess for home care needs following discharge   - Consider OT consult to assist with ADL evaluation and planning for discharge  - Provide patient education as appropriate  Outcome: Progressing     Problem: PAIN - ADULT  Goal: Verbalizes/displays adequate comfort level or baseline comfort level  Description  Interventions:  - Encourage patient to monitor pain and request assistance  - Assess pain using appropriate pain scale  - Administer analgesics based on type and severity of pain and evaluate response  - Implement non-pharmacological measures as appropriate and evaluate response  - Consider cultural and social influences on pain and pain management  - Notify physician/advanced practitioner if interventions unsuccessful or patient reports new pain  Outcome: Progressing     Problem: INFECTION - ADULT  Goal: Absence or prevention of progression during hospitalization  Description  INTERVENTIONS:  - Assess and monitor for signs and symptoms of infection  - Monitor lab/diagnostic results  - Monitor all insertion sites, i e  indwelling lines, tubes, and drains  - [place] appropriate cooling/warming therapies per order  - Administer medications as ordered  - Instruct and encourage patient and family to use good hand hygiene technique  - Identify and instruct in appropriate isolation precautions for identified infection/condition   Outcome: Progressing     Problem: SAFETY ADULT  Goal: Patient will remain free of falls  Description  INTERVENTIONS:  - Assess patient frequently for physical needs  -  Identify cognitive and physical deficits and behaviors that affect risk of falls    -  [place] fall precautions as indicated by assessment   - Educate patient/family on patient safety including physical limitations  - Instruct patient to call for assistance with activity based on assessment  - Modify environment to reduce risk of injury  - Consider OT/PT consult to assist with strengthening/mobility  Outcome: Progressing  Goal: Maintain or return to baseline ADL function  Description  INTERVENTIONS:  -  Assess patient's ability to carry out ADLs; assess patient's baseline for ADL function and identify physical deficits which impact ability to perform ADLs (bathing, care of mouth/teeth, toileting, grooming, dressing, etc )  - Assess/evaluate cause of self-care deficits   - Assess range of motion  - Assess patient's mobility; develop plan if impaired  - Assess patient's need for assistive devices and provide as appropriate  - Encourage maximum independence but intervene and supervise when necessary  - Involve family in performance of ADLs  - Assess for home care needs following discharge   - Consider OT consult to assist with ADL evaluation and planning for discharge  - Provide patient education as appropriate  Outcome: Progressing  Goal: Maintain or return mobility status to optimal level  Description  INTERVENTIONS:  - Assess patient's baseline mobility status (ambulation, transfers, stairs, etc )    - Identify cognitive and physical deficits and behaviors that affect mobility  - Identify mobility aids required to assist with transfers and/or ambulation (gait belt, sit-to-stand, lift, walker, cane, etc )  - [place] fall precautions as indicated by assessment  - Record patient progress and toleration of activity level on Mobility SBAR; progress patient to next Phase/Stage  - Instruct patient to call for assistance with activity based on assessment  - Consider rehabilitation consult to assist with strengthening/weightbearing, etc   Outcome: Progressing     Problem: DISCHARGE PLANNING  Goal: Discharge to home or other facility with appropriate resources  Description  INTERVENTIONS:  - Identify barriers to discharge w/patient and caregiver  - Arrange for needed discharge resources and transportation as appropriate  - Identify discharge learning needs (meds, wound care, etc )  - Arrange for interpretive services to assist at discharge as needed  - Refer to Case Management Department for coordinating discharge planning if the patient needs post-hospital services based on physician/advanced practitioner order or complex needs related to functional status, cognitive ability, or social support system  Outcome: Progressing     Problem: Knowledge Deficit  Goal: Patient/family/caregiver demonstrates understanding of disease process, treatment plan, medications, and discharge instructions  Description  Complete learning assessment and assess knowledge base  Interventions:  - Provide teaching at level of understanding  - Provide teaching via preferred learning methods  Outcome: Progressing     Problem: Potential for Falls  Goal: Patient will remain free of falls  Description  INTERVENTIONS:  - Assess patient frequently for physical needs  -  Identify cognitive and physical deficits and behaviors that affect risk of falls    -  [place] fall precautions as indicated by assessment   - Educate patient/family on patient safety including physical limitations  - Instruct patient to call for assistance with activity based on assessment  - Modify environment to reduce risk of injury  - Consider OT/PT consult to assist with strengthening/mobility  Outcome: Progressing     Problem: Nutrition/Hydration-ADULT  Goal: Nutrient/Hydration intake appropriate for improving, restoring or maintaining nutritional needs  Description  Monitor and assess patient's nutrition/hydration status for malnutrition  Collaborate with interdisciplinary team and initiate plan and interventions as ordered  Monitor patient's weight and dietary intake as ordered or per policy  Utilize nutrition screening tool and intervene as necessary  Determine patient's food preferences and provide high-protein, high-caloric foods as appropriate       INTERVENTIONS:  - Monitor oral intake, urinary output, labs, and treatment plans  - Assess nutrition and hydration status and recommend course of action  - Evaluate amount of meals eaten  - Assist patient with eating if necessary   - Allow adequate time for meals  - Recommend/ encourage appropriate diets, oral nutritional supplements, and vitamin/mineral supplements  - Order, calculate, and assess calorie counts as needed  - Recommend, monitor, and adjust tube feedings and TPN/PPN based on assessed needs  - Assess need for intravenous fluids  - Provide specific nutrition/hydration education as appropriate  - Include patient/family/caregiver in decisions related to nutrition  Outcome: Progressing     Problem: CONFUSION/THOUGHT DISTURBANCE  Goal: Thought disturbances (confusion, delirium, depression, dementia or psychosis) are managed to maintain or return to baseline mental status and functional level  Description  INTERVENTIONS:  - Assess for possible contributors to  thought disturbance, including but not limited to medications, infection, impaired vision or hearing, underlying metabolic abnormalities, dehydration, respiratory compromise,  psychiatric diagnoses and notify attending PHYSICAN/AP  - Monitor and intervene to maintain adequate nutrition, hydration, elimination, sleep and activity  - Decrease environmental stimuli, including noise as appropriate  - Provide frequent contacts to provide refocusing, direction and reassurance as needed  Approach patient calmly with eye contact and at their level  - [place] high risk fall precautions, aspiration precautions and other safety measures, as indicated  - If delirium suspected, notify physician/AP of change in condition and request immediate in-person evaluation  - Pursue consults as appropriate including Geriatric (campus dependent), OT for cognitive evaluation/activity planning, psychiatric, pastoral care, etc   Outcome: Progressing     Problem: BEHAVIOR  Goal: Pt/Family maintain appropriate behavior and adhere to behavioral management agreement, if implemented  Description  INTERVENTIONS:  - Assess the family dynamic   - Encourage verbalization of thoughts and concerns in a socially appropriate manner  - Assess patient/family's coping skills and non-compliant behavior (including use of illegal substances)  - Utilize positive, consistent limit setting strategies supporting safety of patient, staff and others  - Initiate consult with Case Management, Spiritual Care or other ancillary services as appropriate  - If a patient's/visitor's behavior jeopardizes the safety of the patient, staff, or others, refer to organization procedure     - Notify Security of behavior or suspected illegal substances which indicate the need for search of the patient and/or belongings  - Encourage participation in the decision making process about a behavioral management agreement; implement if patient meets criteria  Outcome: Progressing

## 2019-10-04 NOTE — PHYSICAL THERAPY NOTE
Physical Therapy Progress Note        Aubrie Louise, JUNO       10/04/19 1205   Pain Assessment   Pain Assessment No/denies pain   Pain Score No Pain   Restrictions/Precautions   Weight Bearing Precautions Per Order No   Other Precautions Chair Alarm; Bed Alarm;Cognitive;THR;Fall Risk  (very lethargic and EC )   General   Chart Reviewed Yes   Cognition   Overall Cognitive Status Impaired   Subjective   Subjective none stated pt very lethargic and EC    Bed Mobility   Supine to Sit 2  Maximal assistance   Additional items Assist x 2;HOB elevated; Increased time required;LE management;Verbal cues   Sit to Supine 2  Maximal assistance   Additional items Assist x 2; Increased time required;LE management;Verbal cues   Transfers   Sit to Stand 3  Moderate assistance   Additional items Assist x 2;Verbal cues   Stand to Sit 3  Moderate assistance   Additional items Assist x 2;Verbal cues   Additional Comments sitting balance to inc medline awareness with OT    Ambulation/Elevation   Gait pattern Not appropriate  (d/t very lethargic )   Balance   Static Sitting Poor +   Dynamic Sitting Poor   Static Standing Poor -   Dynamic Standing Poor -   Ambulatory Zero   Endurance Deficit   Endurance Deficit Yes   Endurance Deficit Description fatigue lethargic and weakness    Activity Tolerance   Activity Tolerance Treatment limited secondary to medical complications (Comment)   Exercises   Balance training  sitting EOB balance awareness and medline    Assessment   Prognosis Fair   Problem List Decreased strength;Decreased range of motion;Decreased endurance; Impaired balance;Decreased mobility; Decreased coordination;Decreased cognition; Impaired judgement;Decreased safety awareness; Impaired vision; Impaired hearing   Assessment pt perform skilled PT focus on inc functional mobility during bed transfers supine<> sit and working with OT for EOB sitting vc's for core strengthneing and medline balance awareness , pt very lethgaric and EC most of treatment   Pt Max AX2 during STS x2  with OT, pt also needs vc's to stay await , pt return to supne level after 38 min of inc functional activities , Pt will cont to need skilled PT , also vc;s pt leaning to his rigyht side   Goals   Patient Goals unable to stated   STG Expiration Date 10/07/19   Plan   Treatment/Interventions Functional transfer training;LE strengthening/ROM; Endurance training;Cognitive reorientation;Patient/family training;Bed mobility   Progress Slow progress, medical status limitations   Recommendation   Recommendation   (rehab )   PT - OK to Discharge Yes  (when medically ready )

## 2019-10-04 NOTE — PLAN OF CARE
Problem: PHYSICAL THERAPY ADULT  Goal: Performs mobility at highest level of function for planned discharge setting  See evaluation for individualized goals  Description  Treatment/Interventions: Functional transfer training, LE strengthening/ROM, Therapeutic exercise, Endurance training, Bed mobility, Gait training, Spoke to nursing, Spoke to case management, OT          See flowsheet documentation for full assessment, interventions and recommendations  Note:   Prognosis: Fair  Problem List: Decreased strength, Decreased range of motion, Decreased endurance, Impaired balance, Decreased mobility, Decreased coordination, Decreased cognition, Impaired judgement, Decreased safety awareness, Impaired vision, Impaired hearing  Assessment: pt perform skilled PT focus on inc functional mobility during bed transfers supine<> sit and working with OT for EOB sitting vc's for core strengthneing and medline balance awareness , pt very lethgaric and EC most of treatment   Pt Max AX2 during STS x2  with OT, pt also needs vc's to stay await , pt return to supne level after 38 min of inc functional activities , Pt will cont to need skilled PT , also vc;s pt leaning to his rigyht side   Recommendation: (rehab )     PT - OK to Discharge: Yes(when medically ready )    See flowsheet documentation for full assessment  Problem: PHYSICAL THERAPY ADULT  Goal: Performs mobility at highest level of function for planned discharge setting  See evaluation for individualized goals  Description  Treatment/Interventions: Functional transfer training, LE strengthening/ROM, Therapeutic exercise, Endurance training, Bed mobility, Gait training, Spoke to nursing, Spoke to case management, OT          See flowsheet documentation for full assessment, interventions and recommendations     Note:   Prognosis: Fair  Problem List: Decreased strength, Decreased range of motion, Decreased endurance, Impaired balance, Decreased mobility, Decreased coordination, Decreased cognition, Impaired judgement, Decreased safety awareness, Impaired vision, Impaired hearing  Assessment: pt perform skilled PT focus on inc functional mobility during bed transfers supine<> sit and working with OT for EOB sitting vc's for core strengthneing and medline balance awareness , pt very lethgaric and EC most of treatment   Pt Max AX2 during STS x2  with OT, pt also needs vc's to stay await , pt return to supne level after 38 min of inc functional activities , Pt will cont to need skilled PT , also vc;s pt leaning to his rigyht side   Recommendation: (rehab )     PT - OK to Discharge: Yes(when medically ready )    See flowsheet documentation for full assessment

## 2019-10-04 NOTE — SOCIAL WORK
CM spoke to [facility] at [facility] to inform that pt is not medically stable for dc today  [facility] states pt is accepted when medically stable; pt can admit on the weekend      Report# [phone]  Fax# [phone]

## 2019-10-04 NOTE — OCCUPATIONAL THERAPY NOTE
Occupational Therapy Note         10/04/19 1203   Restrictions/Precautions   Weight Bearing Precautions Per Order No   Other Precautions Cognitive; Chair Alarm; Bed Alarm; Fall Risk  (Pt lethargic)   Lifestyle   Autonomy I ADLS, assist IADLS, and Mod I w/ transfers and functional mobility PTA   Reciprocal Relationships Pt lives w/ spouse; has son and dght in law next door   Service to Others Pt is retired   Intrinsic Gratification Pt enjoys gardening   Pain Assessment   Pain Assessment No/denies pain   Pain Score No Pain   ADL   Where Assessed Edge of bed   Eating Assistance 2  Maximal Assistance   Eating Deficit Setup;Bringing food to mouth assist;Scoop assist;Thickened liquids; Increased time to complete;Verbal cueing   Grooming Assistance 2  Maximal Assistance   Grooming Deficit Setup;Verbal cueing;Supervision/safety; Increased time to complete;Wash/dry face;Brushing hair   Bed Mobility   Rolling R 2  Maximal assistance   Additional items Assist x 1;Verbal cues   Rolling L 2  Maximal assistance   Additional items Assist x 1; Increased time required;Verbal cues   Supine to Sit 2  Maximal assistance   Additional items Assist x 2;HOB elevated; Increased time required;LE management;Verbal cues   Sit to Supine 2  Maximal assistance   Additional items Assist x 1; Increased time required;LE management;Verbal cues   Transfers   Sit to Stand 3  Moderate assistance   Additional items Assist x 2;Verbal cues   Stand to Sit 3  Moderate assistance   Additional items Assist x 2;Verbal cues   Cognition   Overall Cognitive Status Impaired   Arousal/Participation Lethargic;Poorly responsive   Attention Difficulty attending to directions   Orientation Level Oriented to person;Disoriented to place; Disoriented to time;Disoriented to situation   Memory Decreased recall of precautions;Decreased recall of recent events;Decreased short term memory   Following Commands Follows one step commands inconsistently   Activity Tolerance   Activity Tolerance Patient limited by fatigue   Assessment   Assessment Pt was seen for a 38 minute OT session with focus on bed mob, functional transfers,balance and activity tolerance with unsupported sitting while completing functional self car task  Pt cleared for treatment session by Rn/  Pt received HOB raised/supine with no goal reported 2* speech deficit  Pt identifiers confirmed  Pt  known to OT from previous treatment session  Pt lethargic, participating in treatment session with eyes closed  Opened eyes to max verbal cues 20% of the time  Pt required max A for unsupported sitting EOB 2* decreased balance/stability  Pt pt required hand over hand assist to initiate grooming task  Pt demonstrated min carryover of task 2* pt lethargic and cognitive impairment  Pt bed alarm active post session with all needs within reach  Pt would benefit from continued skilled OT services to improve deficits that are impairing his/her current level of function  Continue treatment PPC  Plan   Treatment Interventions ADL retraining;Functional transfer training;UE strengthening/ROM; Endurance training;Cognitive reorientation;Patient/family training;Equipment evaluation/education; Compensatory technique education;Continued evaluation; Energy conservation; Activityengagement   Goal Expiration Date 10/07/19   OT Treatment Day 3   OT Frequency 3-5x/wk   Recommendation   OT Discharge Recommendation Short Term Rehab   OT - OK to Discharge Yes  (when medically stable)   Barthel Index   Feeding 10   Bathing 0   Grooming Score 0   Dressing Score 5   Bladder Score 0   Bowels Score 10   Toilet Use Score 5   Transfers (Bed/Chair) Score 0   Mobility (Level Surface) Score 0   Stairs Score 0   Barthel Index Score 30   Modified Lagrange Scale   Modified Lagrange Scale 4     Velia Oliver FLEX Student

## 2019-10-04 NOTE — PLAN OF CARE
Problem: OCCUPATIONAL THERAPY ADULT  Goal: Performs self-care activities at highest level of function for planned discharge setting  See evaluation for individualized goals  Description  Treatment Interventions: ADL retraining, Functional transfer training, UE strengthening/ROM, Endurance training, Cognitive reorientation, Patient/family training, Equipment evaluation/education, Compensatory technique education, Continued evaluation, Energy conservation, Activityengagement          See flowsheet documentation for full assessment, interventions and recommendations  Outcome: Progressing  Note:   Limitation: Decreased ADL status, Decreased UE strength, Decreased Safe judgement during ADL, Decreased cognition, Decreased endurance, Visual deficit, Decreased self-care trans, Decreased high-level ADLs  Prognosis: Fair  Assessment: Pt was seen for a 38 minute OT session with focus on bed mob, functional transfers,balance and activity tolerance with unsupported sitting while completing functional self car task  Pt cleared for treatment session by Rn/  Pt received HOB raised/supine with no goal reported 2* speech deficit  Pt identifiers confirmed  Pt  known to OT from previous treatment session  Pt lethargic, participating in treatment session with eyes closed  Opened eyes to max verbal cues 20% of the time  Pt required max A for unsupported sitting EOB 2* decreased balance/stability  Pt pt required hand over hand assist to initiate grooming task  Pt demonstrated min carryover of task 2* pt lethargic and cognitive impairment  Pt bed alarm active post session with all needs within reach  Pt would benefit from continued skilled OT services to improve deficits that are impairing his/her current level of function  Continue treatment PPC        OT Discharge Recommendation: Short Term Rehab  OT - OK to Discharge: Yes(when medically stable)

## 2019-10-04 NOTE — ASSESSMENT & PLAN NOTE
The patient was more combative today this morning with nursing staff but has remained awake for the day  He is awake and alert but confused and only oriented to person  Sleep/wake cycle has not been improved with low-dose and high-dose melatonin and with trazodone  Patient remained awake all night until 4:00 a m  And has been somnolent throughout the day    Will increase Zyprexa 10 mg tonight

## 2019-10-04 NOTE — ASSESSMENT & PLAN NOTE
Creatinine is stable, likely at baseline  Monitor off IV fluids at this time  Encourage oral intake  Monitor BMP

## 2019-10-05 LAB
ANION GAP SERPL CALCULATED.3IONS-SCNC: 6 MMOL/L (ref 4–13)
BUN SERPL-MCNC: 51 MG/DL (ref 5–25)
CALCIUM SERPL-MCNC: 9 MG/DL (ref 8.3–10.1)
CHLORIDE SERPL-SCNC: 110 MMOL/L (ref 100–108)
CO2 SERPL-SCNC: 24 MMOL/L (ref 21–32)
CREAT SERPL-MCNC: 1.92 MG/DL (ref 0.6–1.3)
GFR SERPL CREATININE-BSD FRML MDRD: 29 ML/MIN/1.73SQ M
GLUCOSE SERPL-MCNC: 78 MG/DL (ref 65–140)
POTASSIUM SERPL-SCNC: 4.5 MMOL/L (ref 3.5–5.3)
SODIUM SERPL-SCNC: 140 MMOL/L (ref 136–145)

## 2019-10-05 PROCEDURE — 99232 SBSQ HOSP IP/OBS MODERATE 35: CPT | Performed by: INTERNAL MEDICINE

## 2019-10-05 PROCEDURE — 80048 BASIC METABOLIC PNL TOTAL CA: CPT | Performed by: INTERNAL MEDICINE

## 2019-10-05 RX ORDER — OLANZAPINE 5 MG/1
5 TABLET, ORALLY DISINTEGRATING ORAL
Status: DISCONTINUED | OUTPATIENT
Start: 2019-10-05 | End: 2019-10-06

## 2019-10-05 RX ORDER — DEXTROSE AND SODIUM CHLORIDE 5; .45 G/100ML; G/100ML
75 INJECTION, SOLUTION INTRAVENOUS CONTINUOUS
Status: DISPENSED | OUTPATIENT
Start: 2019-10-05 | End: 2019-10-06

## 2019-10-05 RX ORDER — BACITRACIN 500 [USP'U]/G
OINTMENT OPHTHALMIC 2 TIMES DAILY
Status: COMPLETED | OUTPATIENT
Start: 2019-10-05 | End: 2019-10-07

## 2019-10-05 RX ADMIN — BACITRACIN: 500 OINTMENT OPHTHALMIC at 17:50

## 2019-10-05 RX ADMIN — BACITRACIN: 500 OINTMENT OPHTHALMIC at 11:57

## 2019-10-05 RX ADMIN — NYSTATIN: 100000 POWDER TOPICAL at 17:47

## 2019-10-05 RX ADMIN — DEXTROSE AND SODIUM CHLORIDE 75 ML/HR: 5; .45 INJECTION, SOLUTION INTRAVENOUS at 23:03

## 2019-10-05 RX ADMIN — ACETAMINOPHEN 975 MG: 325 TABLET ORAL at 21:30

## 2019-10-05 RX ADMIN — DEXTROSE AND SODIUM CHLORIDE 75 ML/HR: 5; .45 INJECTION, SOLUTION INTRAVENOUS at 09:19

## 2019-10-05 RX ADMIN — DIVALPROEX SODIUM 500 MG: 125 CAPSULE, COATED PELLETS ORAL at 19:47

## 2019-10-05 RX ADMIN — OLANZAPINE 5 MG: 5 TABLET, ORALLY DISINTEGRATING ORAL at 21:29

## 2019-10-05 RX ADMIN — NYSTATIN: 100000 POWDER TOPICAL at 09:19

## 2019-10-05 NOTE — ASSESSMENT & PLAN NOTE
The patient was more combative today this morning with nursing staff but has remained awake for the day  He is awake and alert but confused and only oriented to person  Sleep/wake cycle has not been improved with low-dose and high-dose melatonin and with trazodone    Slept through the night and the majority of the day with 10mg of zyprexa, will change back to 5mg and observe

## 2019-10-05 NOTE — PROGRESS NOTES
Pastoral Care Progress Note    10/5/2019  Patient: Vega Lopez : 1924  Admission Date & Time: 2019 1837  MRN: [id number] CSN: [id number]                     Chaplaincy Interventions Utilized:       Relationship Building: Provided silent and supportive presence    Ritual: Provided prayer            Chaplaincy Outcomes Achieved:      Was present at [facility]

## 2019-10-05 NOTE — PROGRESS NOTES
Progress Note Carina Cortez 1/30/1924, 80 y o  male MRN: [id number]    [facility] Encounter: [id number]    Primary Care Provider: Marilyn Skelton DO   Date and time admitted to hospital: 9/22/2019  6:37 PM        * SAH (subarachnoid hemorrhage) [facility])  Assessment & Plan  NO AP or AC  MRI and CT head shows stable hemorrhage  Continue PT/OT  Fall precaution    Encephalopathy  Assessment & Plan  The patient was more combative today this morning with nursing staff but has remained awake for the day  He is awake and alert but confused and only oriented to person  Sleep/wake cycle has not been improved with low-dose and high-dose melatonin and with trazodone  Slept through the night and the majority of the day with 10mg of zyprexa, will change back to 5mg and observe      Urinary retention  Assessment & Plan  The patient has had Barrera catheter placed twice as he has failed voiding trials  Continue barrera catheter for now until more mobile and alert    CKD (chronic kidney disease) stage 3, GFR 30-59 ml/min ([facility])  Assessment & Plan  Unknown baseline renal function, continue to monitor BMP    FRANCOIS (acute kidney injury) ([facility] [address] )  Assessment & Plan  Creatinine is stable, likely at baseline  Continue IVF for now  Encourage oral intake  Monitor BMP    A-fib ([facility] [address] )  Assessment & Plan  Currently sinus  No rate control medicines needed  No AC due to bleed    Coronary artery disease involving native coronary artery of native heart without angina pectoris  Assessment & Plan  S/p remote BMS  Pt on no meds at home    Essential hypertension  Assessment & Plan  Continue Norvasc  Goal SBP < 140  Labetalol prn        VTE Pharmacologic Prophylaxis:   Pharmacologic: Heparin  Mechanical VTE Prophylaxis in Place: Yes    Patient Centered Rounds: I have performed bedside rounds with nursing staff today  Time Spent for Care: 20 minutes    More than 50% of total time spent on counseling and coordination of care as described above     Current Length of Stay: 13 day(s)    Current Patient Status: Inpatient   Certification Statement: The patient will continue to require additional inpatient hospital stay due to monitor for stability with zyprexa    Discharge Plan: SNF likely monday    Code Status: Level 3 - DNAR and DNI      Subjective:   limited due to sedation    Objective:     Vitals:   Temp (24hrs), Av 7 °F (36 5 °C), Min:97 7 °F (36 5 °C), Max:97 7 °F (36 5 °C)    Temp:  [97 7 °F (36 5 °C)] 97 7 °F (36 5 °C)  HR:  [57-69] 65  Resp:  [17] 17  BP: ()/(57-80) 105/62  SpO2:  [97 %-99 %] 98 %  Body mass index is 23 41 kg/m²  Input and Output Summary (last 24 hours): Intake/Output Summary (Last 24 hours) at 10/5/2019 1722  Last data filed at 10/5/2019 1532  Gross per 24 hour   Intake 0 ml   Output 1025 ml   Net -1025 ml       Physical Exam:     Physical Exam   Constitutional:   Thin elderly male, lying in bed, awake   HENT:   Head: Normocephalic and atraumatic  Eyes: Pupils are equal, round, and reactive to light  EOM are normal    Neck: Neck supple  Cardiovascular: Normal rate and regular rhythm  Pulmonary/Chest: Effort normal    Abdominal: Soft  Musculoskeletal: He exhibits no edema  Neurological: He is alert  Sedated but does move all 4 extremities and can repeat his name   Skin: Skin is warm and dry  Additional Data:     Labs:    Results from last 7 days   Lab Units 10/04/19  0603   WBC Thousand/uL 8 44   HEMOGLOBIN g/dL 13 0   HEMATOCRIT % 37 5   PLATELETS Thousands/uL 185     Results from last 7 days   Lab Units 10/05/19  0735   SODIUM mmol/L 140   POTASSIUM mmol/L 4 5   CHLORIDE mmol/L 110*   CO2 mmol/L 24   BUN mg/dL 51*   CREATININE mg/dL 1 92*   ANION GAP mmol/L 6   CALCIUM mg/dL 9 0   GLUCOSE RANDOM mg/dL 78                           * I Have Reviewed All Lab Data Listed Above  * Additional Pertinent Lab Tests Reviewed:  All Labs Within Last 24 Hours Reviewed      Recent Cultures (last 7 days): Last 24 Hours Medication List:     Current Facility-Administered Medications:  acetaminophen 975 mg Oral [facility] Tuesday M MARC Moreno    amLODIPine 5 mg Oral Daily Larry Aguero DO    bacitracin  Both Eyes BID Daryn Hill MD    dextrose 5 % and sodium chloride 0 45 % 75 mL/hr Intravenous Continuous Daryn Hill MD Last Rate: 75 mL/hr (10/05/19 0919)   divalproex sodium 500 mg Oral Daily Daryn Hill MD    docusate sodium 100 mg Oral BID Daryn Hill MD    Labetalol HCl 5 mg Intravenous Q4H PRN Janna Stahl MD    nystatin  Topical BID Komal Hummel DO    OLANZapine 5 mg Oral HS Daryn Hill MD         Today, Patient Was Seen By: Luciana Pizarro MD    ** Please Note: Dictation voice to text software may have been used in the creation of this document   **

## 2019-10-05 NOTE — PLAN OF CARE
Problem: Prexisting or High Potential for Compromised Skin Integrity  Goal: Skin integrity is maintained or improved  Description  INTERVENTIONS:  - Identify patients at risk for skin breakdown  - Assess and monitor skin integrity  - Assess and monitor nutrition and hydration status  - Monitor labs   - Assess for incontinence   - Turn and reposition patient  - Assist with mobility/ambulation  - Relieve pressure over bony prominences  - Avoid friction and shearing  - Provide appropriate hygiene as needed including keeping skin clean and dry  - Evaluate need for skin moisturizer/barrier cream  - Collaborate with interdisciplinary team   - Patient/family teaching  - Consider wound care consult   Outcome: Progressing     Problem: Neurological Deficit  Goal: Neurological status is stable or improving  Description  Interventions:  - Monitor and assess patient's level of consciousness, motor function, sensory function, and level of assistance needed for ADLs  - Monitor and report changes from baseline  Collaborate with interdisciplinary team to initiate plan and implement interventions as ordered  - Provide and maintain a safe environment  - Consider seizure precautions  - Consider fall precautions  - Consider aspiration precautions  - Consider bleeding precautions  Outcome: Progressing     Problem: Activity Intolerance/Impaired Mobility  Goal: Mobility/activity is maintained at optimum level for patient  Description  Interventions:  - Assess and monitor patient  barriers to mobility and need for assistive/adaptive devices  - Assess patient's emotional response to limitations  - Collaborate with interdisciplinary team and initiate plans and interventions as ordered  - Encourage independent activity per ability   - Maintain proper body alignment  - Perform active/passive rom as tolerated/ordered    - Plan activities to conserve energy   - Turn patient as appropriate  Outcome: Progressing     Problem: Potential for Aspiration  Goal: Non-ventilated patient's risk of aspiration is minimized  Description  Assess and monitor vital signs, respiratory status, and labs (WBC)  Monitor for signs of aspiration (tachypnea, cough, rales, wheezing, cyanosis, fever)  - Assess and monitor patient's ability to swallow  - Place patient up in chair to eat if possible  - HOB up at 90 degrees to eat if unable to get patient up into chair   - Supervise patient during oral intake  - Instruct patient/ family to take small bites  - Instruct patient/ family to take small single sips when taking liquids  - Follow patient-specific strategies generated by speech pathologist   Outcome: Progressing     Problem: Nutrition  Goal: Nutrition/Hydration status is improving  Description  Monitor and assess patient's nutrition/hydration status for malnutrition (ex- brittle hair, bruises, dry skin, pale skin and conjunctiva, muscle wasting, smooth red tongue, and disorientation)  Collaborate with interdisciplinary team and initiate plan and interventions as ordered  Monitor patient's weight and dietary intake as ordered or per policy  Utilize nutrition screening tool and intervene per policy  Determine patient's food preferences and provide high-protein, high-caloric foods as appropriate  - Assist patient with eating   - Allow adequate time for meals   - Encourage patient to take dietary supplement as ordered  - Collaborate with clinical nutritionist   - Include patient/family/caregiver in decisions related to nutrition    Outcome: Progressing     Problem: NEUROSENSORY - ADULT  Goal: Achieves stable or improved neurological status  Description  INTERVENTIONS  - Monitor and report changes in neurological status  - Monitor vital signs such as temperature, blood pressure, glucose, and any other labs ordered   - Initiate measures to prevent increased intracranial pressure  - Monitor for seizure activity and implement precautions if appropriate Outcome: Progressing  Goal: Achieves maximal functionality and self care  Description  INTERVENTIONS  - Monitor swallowing and airway patency with patient fatigue and changes in neurological status  - Encourage and assist patient to increase activity and self care  - Encourage visually impaired, hearing impaired and aphasic patients to use assistive/communication devices  Outcome: Progressing     Problem: NEUROSENSORY - ADULT  Goal: Achieves stable or improved neurological status  Description  INTERVENTIONS  - Monitor and report changes in neurological status  - Monitor vital signs such as temperature, blood pressure, glucose, and any other labs ordered   - Initiate measures to prevent increased intracranial pressure  - Monitor for seizure activity and implement precautions if appropriate      Outcome: Progressing  Goal: Achieves maximal functionality and self care  Description  INTERVENTIONS  - Monitor swallowing and airway patency with patient fatigue and changes in neurological status  - Encourage and assist patient to increase activity and self care     - Encourage visually impaired, hearing impaired and aphasic patients to use assistive/communication devices  Outcome: Progressing     Problem: GENITOURINARY - ADULT  Goal: Urinary catheter remains patent  Description  INTERVENTIONS:  - Assess patency of urinary catheter  - If patient has a chronic barrera, consider changing catheter if non-functioning  - Follow guidelines for intermittent irrigation of non-functioning urinary catheter  Outcome: Progressing     Problem: METABOLIC, FLUID AND ELECTROLYTES - ADULT  Goal: Electrolytes maintained within normal limits  Description  INTERVENTIONS:  - Monitor labs and assess patient for signs and symptoms of electrolyte imbalances  - Administer electrolyte replacement as ordered  - Monitor response to electrolyte replacements, including repeat lab results as appropriate  - Instruct patient on fluid and nutrition as appropriate  Outcome: Progressing  Goal: Fluid balance maintained  Description  INTERVENTIONS:  - Monitor labs   - Monitor I/O and WT  - Instruct patient on fluid and nutrition as appropriate  - Assess for signs & symptoms of volume excess or deficit  Outcome: Progressing     Problem: SKIN/TISSUE INTEGRITY - ADULT  Goal: Skin integrity remains intact  Description  INTERVENTIONS  - Identify patients at risk for skin breakdown  - Assess and monitor skin integrity  - Assess and monitor nutrition and hydration status  - Monitor labs (i e  albumin)  - Assess for incontinence   - Turn and reposition patient  - Assist with mobility/ambulation  - Relieve pressure over bony prominences  - Avoid friction and shearing  - Provide appropriate hygiene as needed including keeping skin clean and dry  - Evaluate need for skin moisturizer/barrier cream  - Collaborate with interdisciplinary team (i e  Nutrition, Rehabilitation, etc )   - Patient/family teaching  Outcome: Progressing     Problem: MUSCULOSKELETAL - ADULT  Goal: Maintain or return mobility to safest level of function  Description  INTERVENTIONS:  - Assess patient's ability to carry out ADLs; assess patient's baseline for ADL function and identify physical deficits which impact ability to perform ADLs (bathing, care of mouth/teeth, toileting, grooming, dressing, etc )  - Assess/evaluate cause of self-care deficits   - Assess range of motion  - Assess patient's mobility  - Assess patient's need for assistive devices and provide as appropriate  - Encourage maximum independence but intervene and supervise when necessary  - Involve family in performance of ADLs  - Assess for home care needs following discharge   - Consider OT consult to assist with ADL evaluation and planning for discharge  - Provide patient education as appropriate  Outcome: Progressing     Problem: PAIN - ADULT  Goal: Verbalizes/displays adequate comfort level or baseline comfort level  Description  Interventions:  - Encourage patient to monitor pain and request assistance  - Assess pain using appropriate pain scale  - Administer analgesics based on type and severity of pain and evaluate response  - Implement non-pharmacological measures as appropriate and evaluate response  - Consider cultural and social influences on pain and pain management  - Notify physician/advanced practitioner if interventions unsuccessful or patient reports new pain  Outcome: Progressing     Problem: PAIN - ADULT  Goal: Verbalizes/displays adequate comfort level or baseline comfort level  Description  Interventions:  - Encourage patient to monitor pain and request assistance  - Assess pain using appropriate pain scale  - Administer analgesics based on type and severity of pain and evaluate response  - Implement non-pharmacological measures as appropriate and evaluate response  - Consider cultural and social influences on pain and pain management  - Notify physician/advanced practitioner if interventions unsuccessful or patient reports new pain  Outcome: Progressing     Problem: INFECTION - ADULT  Goal: Absence or prevention of progression during hospitalization  Description  INTERVENTIONS:  - Assess and monitor for signs and symptoms of infection  - Monitor lab/diagnostic results  - Monitor all insertion sites, i e  indwelling lines, tubes, and drains  - [place] appropriate cooling/warming therapies per order  - Administer medications as ordered  - Instruct and encourage patient and family to use good hand hygiene technique  - Identify and instruct in appropriate isolation precautions for identified infection/condition   Outcome: Progressing     Problem: SAFETY ADULT  Goal: Patient will remain free of falls  Description  INTERVENTIONS:  - Assess patient frequently for physical needs  -  Identify cognitive and physical deficits and behaviors that affect risk of falls    -  [place] fall precautions as indicated by assessment   - Educate patient/family on patient safety including physical limitations  - Instruct patient to call for assistance with activity based on assessment  - Modify environment to reduce risk of injury  - Consider OT/PT consult to assist with strengthening/mobility  Outcome: Progressing  Goal: Maintain or return to baseline ADL function  Description  INTERVENTIONS:  -  Assess patient's ability to carry out ADLs; assess patient's baseline for ADL function and identify physical deficits which impact ability to perform ADLs (bathing, care of mouth/teeth, toileting, grooming, dressing, etc )  - Assess/evaluate cause of self-care deficits   - Assess range of motion  - Assess patient's mobility; develop plan if impaired  - Assess patient's need for assistive devices and provide as appropriate  - Encourage maximum independence but intervene and supervise when necessary  - Involve family in performance of ADLs  - Assess for home care needs following discharge   - Consider OT consult to assist with ADL evaluation and planning for discharge  - Provide patient education as appropriate  Outcome: Progressing  Goal: Maintain or return mobility status to optimal level  Description  INTERVENTIONS:  - Assess patient's baseline mobility status (ambulation, transfers, stairs, etc )    - Identify cognitive and physical deficits and behaviors that affect mobility  - Identify mobility aids required to assist with transfers and/or ambulation (gait belt, sit-to-stand, lift, walker, cane, etc )  - [place] fall precautions as indicated by assessment  - Record patient progress and toleration of activity level on Mobility SBAR; progress patient to next Phase/Stage  - Instruct patient to call for assistance with activity based on assessment  - Consider rehabilitation consult to assist with strengthening/weightbearing, etc   Outcome: Progressing     Problem: DISCHARGE PLANNING  Goal: Discharge to home or other facility with appropriate resources  Description  INTERVENTIONS:  - Identify barriers to discharge w/patient and caregiver  - Arrange for needed discharge resources and transportation as appropriate  - Identify discharge learning needs (meds, wound care, etc )  - Arrange for interpretive services to assist at discharge as needed  - Refer to Case Management Department for coordinating discharge planning if the patient needs post-hospital services based on physician/advanced practitioner order or complex needs related to functional status, cognitive ability, or social support system  Outcome: Progressing     Problem: Knowledge Deficit  Goal: Patient/family/caregiver demonstrates understanding of disease process, treatment plan, medications, and discharge instructions  Description  Complete learning assessment and assess knowledge base  Interventions:  - Provide teaching at level of understanding  - Provide teaching via preferred learning methods  Outcome: Progressing     Problem: Potential for Falls  Goal: Patient will remain free of falls  Description  INTERVENTIONS:  - Assess patient frequently for physical needs  -  Identify cognitive and physical deficits and behaviors that affect risk of falls  -  [place] fall precautions as indicated by assessment   - Educate patient/family on patient safety including physical limitations  - Instruct patient to call for assistance with activity based on assessment  - Modify environment to reduce risk of injury  - Consider OT/PT consult to assist with strengthening/mobility  Outcome: Progressing     Problem: Nutrition/Hydration-ADULT  Goal: Nutrient/Hydration intake appropriate for improving, restoring or maintaining nutritional needs  Description  Monitor and assess patient's nutrition/hydration status for malnutrition  Collaborate with interdisciplinary team and initiate plan and interventions as ordered  Monitor patient's weight and dietary intake as ordered or per policy   Utilize nutrition screening tool and intervene as necessary  Determine patient's food preferences and provide high-protein, high-caloric foods as appropriate  INTERVENTIONS:  - Monitor oral intake, urinary output, labs, and treatment plans  - Assess nutrition and hydration status and recommend course of action  - Evaluate amount of meals eaten  - Assist patient with eating if necessary   - Allow adequate time for meals  - Recommend/ encourage appropriate diets, oral nutritional supplements, and vitamin/mineral supplements  - Order, calculate, and assess calorie counts as needed  - Recommend, monitor, and adjust tube feedings and TPN/PPN based on assessed needs  - Assess need for intravenous fluids  - Provide specific nutrition/hydration education as appropriate  - Include patient/family/caregiver in decisions related to nutrition  Outcome: Progressing     Problem: CONFUSION/THOUGHT DISTURBANCE  Goal: Thought disturbances (confusion, delirium, depression, dementia or psychosis) are managed to maintain or return to baseline mental status and functional level  Description  INTERVENTIONS:  - Assess for possible contributors to  thought disturbance, including but not limited to medications, infection, impaired vision or hearing, underlying metabolic abnormalities, dehydration, respiratory compromise,  psychiatric diagnoses and notify attending PHYSICAN/AP  - Monitor and intervene to maintain adequate nutrition, hydration, elimination, sleep and activity  - Decrease environmental stimuli, including noise as appropriate  - Provide frequent contacts to provide refocusing, direction and reassurance as needed  Approach patient calmly with eye contact and at their level    - [place] high risk fall precautions, aspiration precautions and other safety measures, as indicated  - If delirium suspected, notify physician/AP of change in condition and request immediate in-person evaluation  - Pursue consults as appropriate including Geriatric (campus dependent), OT for cognitive evaluation/activity planning, psychiatric, pastoral care, etc   Outcome: Progressing     Problem: BEHAVIOR  Goal: Pt/Family maintain appropriate behavior and adhere to behavioral management agreement, if implemented  Description  INTERVENTIONS:  - Assess the family dynamic   - Encourage verbalization of thoughts and concerns in a socially appropriate manner  - Assess patient/family's coping skills and non-compliant behavior (including use of illegal substances)  - Utilize positive, consistent limit setting strategies supporting safety of patient, staff and others  - Initiate consult with Case Management, Spiritual Care or other ancillary services as appropriate  - If a patient's/visitor's behavior jeopardizes the safety of the patient, staff, or others, refer to organization procedure     - Notify Security of behavior or suspected illegal substances which indicate the need for search of the patient and/or belongings  - Encourage participation in the decision making process about a behavioral management agreement; implement if patient meets criteria  Outcome: Progressing

## 2019-10-06 PROCEDURE — 99232 SBSQ HOSP IP/OBS MODERATE 35: CPT | Performed by: INTERNAL MEDICINE

## 2019-10-06 RX ORDER — DEXTROSE AND SODIUM CHLORIDE 5; .45 G/100ML; G/100ML
75 INJECTION, SOLUTION INTRAVENOUS CONTINUOUS
Status: DISCONTINUED | OUTPATIENT
Start: 2019-10-06 | End: 2019-10-07

## 2019-10-06 RX ORDER — QUETIAPINE FUMARATE 25 MG/1
12.5 TABLET, FILM COATED ORAL
Status: DISCONTINUED | OUTPATIENT
Start: 2019-10-06 | End: 2019-10-08

## 2019-10-06 RX ORDER — QUETIAPINE FUMARATE 25 MG/1
25 TABLET, FILM COATED ORAL
Status: DISCONTINUED | OUTPATIENT
Start: 2019-10-06 | End: 2019-10-06

## 2019-10-06 RX ORDER — OLANZAPINE 10 MG/1
2.5 INJECTION, POWDER, LYOPHILIZED, FOR SOLUTION INTRAMUSCULAR ONCE AS NEEDED
Status: COMPLETED | OUTPATIENT
Start: 2019-10-06 | End: 2019-10-06

## 2019-10-06 RX ADMIN — DIVALPROEX SODIUM 500 MG: 125 CAPSULE, COATED PELLETS ORAL at 17:19

## 2019-10-06 RX ADMIN — ACETAMINOPHEN 975 MG: 325 TABLET ORAL at 21:19

## 2019-10-06 RX ADMIN — DOCUSATE SODIUM 100 MG: 100 CAPSULE, LIQUID FILLED ORAL at 17:19

## 2019-10-06 RX ADMIN — DEXTROSE AND SODIUM CHLORIDE 75 ML/HR: 5; .45 INJECTION, SOLUTION INTRAVENOUS at 12:35

## 2019-10-06 RX ADMIN — NYSTATIN: 100000 POWDER TOPICAL at 10:24

## 2019-10-06 RX ADMIN — NYSTATIN: 100000 POWDER TOPICAL at 17:20

## 2019-10-06 RX ADMIN — QUETIAPINE FUMARATE 12.5 MG: 25 TABLET ORAL at 17:19

## 2019-10-06 RX ADMIN — ACETAMINOPHEN 975 MG: 325 TABLET ORAL at 15:24

## 2019-10-06 RX ADMIN — BACITRACIN: 500 OINTMENT OPHTHALMIC at 17:20

## 2019-10-06 RX ADMIN — BACITRACIN: 500 OINTMENT OPHTHALMIC at 10:24

## 2019-10-06 RX ADMIN — OLANZAPINE 2.5 MG: 10 INJECTION, POWDER, FOR SOLUTION INTRAMUSCULAR at 01:28

## 2019-10-06 NOTE — PROGRESS NOTES
Progress Note Dulce Tay 1/30/1924, 80 y o  male MRN: [id number]    Unit/Bed#: [facility] 717-01 Encounter: [id number]    Primary Care Provider: Jovani Menon DO   Date and time admitted to hospital: 9/22/2019  6:37 PM        * SAH (subarachnoid hemorrhage) [facility])  Assessment & Plan  NO AP or AC  MRI and CT head shows stable hemorrhage  Continue PT/OT  Fall precaution    Encephalopathy  Assessment & Plan  The patient was more combative today this morning with nursing staff but has remained awake for the day  He is awake and alert but confused and only oriented to person  Sleep/wake cycle has not been improved with low-dose and high-dose melatonin and with trazodone  Has not been tolerating olanzapine and has been agitated throughout the night after his dose and combatitive with staff today  Trial of seroquel 12 5 tonight  Consult with geriactrics      Urinary retention  Assessment & Plan  The patient has had Barrera catheter placed twice as he has failed voiding trials  Continue barrera catheter for now until more mobile and alert    CKD (chronic kidney disease) stage 3, GFR 30-59 ml/min ([facility])  Assessment & Plan  Unknown baseline renal function, continue to monitor BMP    FRANCOIS (acute kidney injury) ([facility] [address] )  Assessment & Plan  Creatinine up due to not eating or drinking when somnolent during day  Continue gently IV hydration  BMP in AM    A-fib ([facility])  Assessment & Plan  Currently sinus  No rate control medicines needed  No AC due to bleed    Coronary artery disease involving native coronary artery of native heart without angina pectoris  Assessment & Plan  S/p remote BMS  Pt on no meds at home    Essential hypertension  Assessment & Plan  Continue Norvasc  Goal SBP < 140  Labetalol prn        VTE Pharmacologic Prophylaxis:   Pharmacologic: Heparin  Mechanical VTE Prophylaxis in Place: Yes    Patient Centered Rounds: I have performed bedside rounds with nursing staff today      Education and Discussions with Family / Patient: patient, plan of care    Time Spent for Care: 20 minutes  More than 50% of total time spent on counseling and coordination of care as described above  Current Length of Stay: 14 day(s)    Current Patient Status: Inpatient   Certification Statement: The patient will continue to require additional inpatient hospital stay due to Delirium    Discharge Plan: SNF when stable    Code Status: Level 3 - DNAR and DNI      Subjective:    the patient was awake and agitated last night  Combative with staff this morning and placed on restraints  Denies any acute complaints but history gathering is limited secondary to confusion  Objective:     Vitals:   Temp (24hrs), Av °F (36 7 °C), Min:98 °F (36 7 °C), Max:98 °F (36 7 °C)    Temp:  [98 °F (36 7 °C)] 98 °F (36 7 °C)  HR:  [65-88] 88  Resp:  [18] 18  BP: (105-125)/(62-73) 125/69  SpO2:  [97 %-98 %] 97 %  Body mass index is 22 53 kg/m²  Input and Output Summary (last 24 hours): Intake/Output Summary (Last 24 hours) at 10/6/2019 1532  Last data filed at 10/6/2019 1500  Gross per 24 hour   Intake 1888 75 ml   Output 1975 ml   Net -86 25 ml       Physical Exam:     Physical Exam   Constitutional:   Thin elderly male, lying in bed, confused   HENT:   Head: Normocephalic and atraumatic  Eyes: Pupils are equal, round, and reactive to light  EOM are normal    Neck: Neck supple  Cardiovascular: Normal rate and regular rhythm  Pulmonary/Chest: Effort normal    Abdominal: Soft  Genitourinary:   Genitourinary Comments: Hoffmann catheter with clear yellow urine   Musculoskeletal: He exhibits no edema  Arm and abdominal restraints place   Neurological: He is alert     Oriented x1   preoccupied with his freedom of Sikhism   moving all 4 extremities         Additional Data:     Labs:    Results from last 7 days   Lab Units 10/04/19  0603   WBC Thousand/uL 8 44   HEMOGLOBIN g/dL 13 0   HEMATOCRIT % 37 5   PLATELETS Thousands/uL 185     Results from last 7 days   Lab Units 10/05/19  0735   SODIUM mmol/L 140   POTASSIUM mmol/L 4 5   CHLORIDE mmol/L 110*   CO2 mmol/L 24   BUN mg/dL 51*   CREATININE mg/dL 1 92*   ANION GAP mmol/L 6   CALCIUM mg/dL 9 0   GLUCOSE RANDOM mg/dL 78                           * I Have Reviewed All Lab Data Listed Above  * Additional Pertinent Lab Tests Reviewed: All Labs Within Last 24 Hours Reviewed      Recent Cultures (last 7 days):           Last 24 Hours Medication List:     Current Facility-Administered Medications:  acetaminophen 975 mg Oral [facility] Tuesday M MARC Moreno    amLODIPine 5 mg Oral Daily Tip Vargas DO    bacitracin  Both Eyes BID Nohemi Yates MD    dextrose 5 % and sodium chloride 0 45 % 75 mL/hr Intravenous Continuous Nohemi Yates MD Last Rate: 75 mL/hr (10/06/19 1235)   divalproex sodium 500 mg Oral Daily Nohemi Yates MD    docusate sodium 100 mg Oral BID Nohemi Yates MD    Labetalol HCl 5 mg Intravenous Q4H PRN Fadia Thomason MD    nystatin  Topical BID Kris Rios DO    QUEtiapine 12 5 mg Oral Daily With Nuris Anguiano MD         Today, Patient Was Seen By: Monserrat Peters MD    ** Please Note: Dictation voice to text software may have been used in the creation of this document   **

## 2019-10-06 NOTE — PLAN OF CARE
Problem: Prexisting or High Potential for Compromised Skin Integrity  Goal: Skin integrity is maintained or improved  Description  INTERVENTIONS:  - Identify patients at risk for skin breakdown  - Assess and monitor skin integrity  - Assess and monitor nutrition and hydration status  - Monitor labs   - Assess for incontinence   - Turn and reposition patient  - Assist with mobility/ambulation  - Relieve pressure over bony prominences  - Avoid friction and shearing  - Provide appropriate hygiene as needed including keeping skin clean and dry  - Evaluate need for skin moisturizer/barrier cream  - Collaborate with interdisciplinary team   - Patient/family teaching  - Consider wound care consult   Outcome: Progressing     Problem: Neurological Deficit  Goal: Neurological status is stable or improving  Description  Interventions:  - Monitor and assess patient's level of consciousness, motor function, sensory function, and level of assistance needed for ADLs  - Monitor and report changes from baseline  Collaborate with interdisciplinary team to initiate plan and implement interventions as ordered  - Provide and maintain a safe environment  - Consider seizure precautions  - Consider fall precautions  - Consider aspiration precautions  - Consider bleeding precautions  Outcome: Progressing     Problem: Activity Intolerance/Impaired Mobility  Goal: Mobility/activity is maintained at optimum level for patient  Description  Interventions:  - Assess and monitor patient  barriers to mobility and need for assistive/adaptive devices  - Assess patient's emotional response to limitations  - Collaborate with interdisciplinary team and initiate plans and interventions as ordered  - Encourage independent activity per ability   - Maintain proper body alignment  - Perform active/passive rom as tolerated/ordered    - Plan activities to conserve energy   - Turn patient as appropriate  Outcome: Progressing     Problem: Potential for Aspiration  Goal: Non-ventilated patient's risk of aspiration is minimized  Description  Assess and monitor vital signs, respiratory status, and labs (WBC)  Monitor for signs of aspiration (tachypnea, cough, rales, wheezing, cyanosis, fever)  - Assess and monitor patient's ability to swallow  - Place patient up in chair to eat if possible  - HOB up at 90 degrees to eat if unable to get patient up into chair   - Supervise patient during oral intake  - Instruct patient/ family to take small bites  - Instruct patient/ family to take small single sips when taking liquids  - Follow patient-specific strategies generated by speech pathologist   Outcome: Progressing     Problem: Nutrition  Goal: Nutrition/Hydration status is improving  Description  Monitor and assess patient's nutrition/hydration status for malnutrition (ex- brittle hair, bruises, dry skin, pale skin and conjunctiva, muscle wasting, smooth red tongue, and disorientation)  Collaborate with interdisciplinary team and initiate plan and interventions as ordered  Monitor patient's weight and dietary intake as ordered or per policy  Utilize nutrition screening tool and intervene per policy  Determine patient's food preferences and provide high-protein, high-caloric foods as appropriate  - Assist patient with eating   - Allow adequate time for meals   - Encourage patient to take dietary supplement as ordered  - Collaborate with clinical nutritionist   - Include patient/family/caregiver in decisions related to nutrition    Outcome: Progressing     Problem: GENITOURINARY - ADULT  Goal: Urinary catheter remains patent  Description  INTERVENTIONS:  - Assess patency of urinary catheter  - If patient has a chronic barrera, consider changing catheter if non-functioning  - Follow guidelines for intermittent irrigation of non-functioning urinary catheter  Outcome: Progressing     Problem: NEUROSENSORY - ADULT  Goal: Achieves stable or improved neurological status  Description  INTERVENTIONS  - Monitor and report changes in neurological status  - Monitor vital signs such as temperature, blood pressure, glucose, and any other labs ordered   - Initiate measures to prevent increased intracranial pressure  - Monitor for seizure activity and implement precautions if appropriate      Outcome: Progressing  Goal: Achieves maximal functionality and self care  Description  INTERVENTIONS  - Monitor swallowing and airway patency with patient fatigue and changes in neurological status  - Encourage and assist patient to increase activity and self care     - Encourage visually impaired, hearing impaired and aphasic patients to use assistive/communication devices  Outcome: Progressing     Problem: METABOLIC, FLUID AND ELECTROLYTES - ADULT  Goal: Electrolytes maintained within normal limits  Description  INTERVENTIONS:  - Monitor labs and assess patient for signs and symptoms of electrolyte imbalances  - Administer electrolyte replacement as ordered  - Monitor response to electrolyte replacements, including repeat lab results as appropriate  - Instruct patient on fluid and nutrition as appropriate  Outcome: Progressing  Goal: Fluid balance maintained  Description  INTERVENTIONS:  - Monitor labs   - Monitor I/O and WT  - Instruct patient on fluid and nutrition as appropriate  - Assess for signs & symptoms of volume excess or deficit  Outcome: Progressing     Problem: GENITOURINARY - ADULT  Goal: Urinary catheter remains patent  Description  INTERVENTIONS:  - Assess patency of urinary catheter  - If patient has a chronic barrera, consider changing catheter if non-functioning  - Follow guidelines for intermittent irrigation of non-functioning urinary catheter  Outcome: Progressing     Problem: SKIN/TISSUE INTEGRITY - ADULT  Goal: Skin integrity remains intact  Description  INTERVENTIONS  - Identify patients at risk for skin breakdown  - Assess and monitor skin integrity  - Assess and monitor nutrition and hydration status  - Monitor labs (i e  albumin)  - Assess for incontinence   - Turn and reposition patient  - Assist with mobility/ambulation  - Relieve pressure over bony prominences  - Avoid friction and shearing  - Provide appropriate hygiene as needed including keeping skin clean and dry  - Evaluate need for skin moisturizer/barrier cream  - Collaborate with interdisciplinary team (i e  Nutrition, Rehabilitation, etc )   - Patient/family teaching  Outcome: Progressing     Problem: MUSCULOSKELETAL - ADULT  Goal: Maintain or return mobility to safest level of function  Description  INTERVENTIONS:  - Assess patient's ability to carry out ADLs; assess patient's baseline for ADL function and identify physical deficits which impact ability to perform ADLs (bathing, care of mouth/teeth, toileting, grooming, dressing, etc )  - Assess/evaluate cause of self-care deficits   - Assess range of motion  - Assess patient's mobility  - Assess patient's need for assistive devices and provide as appropriate  - Encourage maximum independence but intervene and supervise when necessary  - Involve family in performance of ADLs  - Assess for home care needs following discharge   - Consider OT consult to assist with ADL evaluation and planning for discharge  - Provide patient education as appropriate  Outcome: Progressing     Problem: PAIN - ADULT  Goal: Verbalizes/displays adequate comfort level or baseline comfort level  Description  Interventions:  - Encourage patient to monitor pain and request assistance  - Assess pain using appropriate pain scale  - Administer analgesics based on type and severity of pain and evaluate response  - Implement non-pharmacological measures as appropriate and evaluate response  - Consider cultural and social influences on pain and pain management  - Notify physician/advanced practitioner if interventions unsuccessful or patient reports new pain  Outcome: Progressing     Problem: INFECTION - ADULT  Goal: Absence or prevention of progression during hospitalization  Description  INTERVENTIONS:  - Assess and monitor for signs and symptoms of infection  - Monitor lab/diagnostic results  - Monitor all insertion sites, i e  indwelling lines, tubes, and drains  - [place] appropriate cooling/warming therapies per order  - Administer medications as ordered  - Instruct and encourage patient and family to use good hand hygiene technique  - Identify and instruct in appropriate isolation precautions for identified infection/condition   Outcome: Progressing     Problem: SAFETY ADULT  Goal: Patient will remain free of falls  Description  INTERVENTIONS:  - Assess patient frequently for physical needs  -  Identify cognitive and physical deficits and behaviors that affect risk of falls    -  [place] fall precautions as indicated by assessment   - Educate patient/family on patient safety including physical limitations  - Instruct patient to call for assistance with activity based on assessment  - Modify environment to reduce risk of injury  - Consider OT/PT consult to assist with strengthening/mobility  Outcome: Progressing  Goal: Maintain or return to baseline ADL function  Description  INTERVENTIONS:  -  Assess patient's ability to carry out ADLs; assess patient's baseline for ADL function and identify physical deficits which impact ability to perform ADLs (bathing, care of mouth/teeth, toileting, grooming, dressing, etc )  - Assess/evaluate cause of self-care deficits   - Assess range of motion  - Assess patient's mobility; develop plan if impaired  - Assess patient's need for assistive devices and provide as appropriate  - Encourage maximum independence but intervene and supervise when necessary  - Involve family in performance of ADLs  - Assess for home care needs following discharge   - Consider OT consult to assist with ADL evaluation and planning for discharge  - Provide patient education as appropriate  Outcome: Progressing  Goal: Maintain or return mobility status to optimal level  Description  INTERVENTIONS:  - Assess patient's baseline mobility status (ambulation, transfers, stairs, etc )    - Identify cognitive and physical deficits and behaviors that affect mobility  - Identify mobility aids required to assist with transfers and/or ambulation (gait belt, sit-to-stand, lift, walker, cane, etc )  - [place] fall precautions as indicated by assessment  - Record patient progress and toleration of activity level on Mobility SBAR; progress patient to next Phase/Stage  - Instruct patient to call for assistance with activity based on assessment  - Consider rehabilitation consult to assist with strengthening/weightbearing, etc   Outcome: Progressing     Problem: Knowledge Deficit  Goal: Patient/family/caregiver demonstrates understanding of disease process, treatment plan, medications, and discharge instructions  Description  Complete learning assessment and assess knowledge base  Interventions:  - Provide teaching at level of understanding  - Provide teaching via preferred learning methods  Outcome: Progressing     Problem: Potential for Falls  Goal: Patient will remain free of falls  Description  INTERVENTIONS:  - Assess patient frequently for physical needs  -  Identify cognitive and physical deficits and behaviors that affect risk of falls    -  [place] fall precautions as indicated by assessment   - Educate patient/family on patient safety including physical limitations  - Instruct patient to call for assistance with activity based on assessment  - Modify environment to reduce risk of injury  - Consider OT/PT consult to assist with strengthening/mobility  Outcome: Progressing     Problem: CONFUSION/THOUGHT DISTURBANCE  Goal: Thought disturbances (confusion, delirium, depression, dementia or psychosis) are managed to maintain or return to baseline mental status and functional level  Description  INTERVENTIONS:  - Assess for possible contributors to  thought disturbance, including but not limited to medications, infection, impaired vision or hearing, underlying metabolic abnormalities, dehydration, respiratory compromise,  psychiatric diagnoses and notify attending PHYSICAN/AP  - Monitor and intervene to maintain adequate nutrition, hydration, elimination, sleep and activity  - Decrease environmental stimuli, including noise as appropriate  - Provide frequent contacts to provide refocusing, direction and reassurance as needed  Approach patient calmly with eye contact and at their level  - [place] high risk fall precautions, aspiration precautions and other safety measures, as indicated  - If delirium suspected, notify physician/AP of change in condition and request immediate in-person evaluation  - Pursue consults as appropriate including Geriatric (campus dependent), OT for cognitive evaluation/activity planning, psychiatric, pastoral care, etc   Outcome: Progressing     Problem: BEHAVIOR  Goal: Pt/Family maintain appropriate behavior and adhere to behavioral management agreement, if implemented  Description  INTERVENTIONS:  - Assess the family dynamic   - Encourage verbalization of thoughts and concerns in a socially appropriate manner  - Assess patient/family's coping skills and non-compliant behavior (including use of illegal substances)  - Utilize positive, consistent limit setting strategies supporting safety of patient, staff and others  - Initiate consult with Case Management, Spiritual Care or other ancillary services as appropriate  - If a patient's/visitor's behavior jeopardizes the safety of the patient, staff, or others, refer to organization procedure     - Notify Security of behavior or suspected illegal substances which indicate the need for search of the patient and/or belongings  - Encourage participation in the decision making process about a behavioral management agreement; implement if patient meets criteria  Outcome: Progressing

## 2019-10-06 NOTE — ASSESSMENT & PLAN NOTE
Creatinine up due to not eating or drinking when somnolent during day  Continue gently IV hydration  BMP in AM

## 2019-10-06 NOTE — ASSESSMENT & PLAN NOTE
The patient was more combative today this morning with nursing staff but has remained awake for the day  He is awake and alert but confused and only oriented to person  Sleep/wake cycle has not been improved with low-dose and high-dose melatonin and with trazodone  Has not been tolerating olanzapine and has been agitated throughout the night after his dose and combatitive with staff today    Trial of seroquel 12 5 tonight  Consult with geriactrics

## 2019-10-07 LAB
ANION GAP SERPL CALCULATED.3IONS-SCNC: 6 MMOL/L (ref 4–13)
BASOPHILS # BLD AUTO: 0.06 THOUSANDS/ΜL (ref 0–0.1)
BASOPHILS NFR BLD AUTO: 1 % (ref 0–1)
BUN SERPL-MCNC: 32 MG/DL (ref 5–25)
CALCIUM SERPL-MCNC: 9.1 MG/DL (ref 8.3–10.1)
CHLORIDE SERPL-SCNC: 107 MMOL/L (ref 100–108)
CO2 SERPL-SCNC: 25 MMOL/L (ref 21–32)
CREAT SERPL-MCNC: 1.52 MG/DL (ref 0.6–1.3)
EOSINOPHIL # BLD AUTO: 0.16 THOUSAND/ΜL (ref 0–0.61)
EOSINOPHIL NFR BLD AUTO: 2 % (ref 0–6)
ERYTHROCYTE [DISTWIDTH] IN BLOOD BY AUTOMATED COUNT: 12.9 % (ref 11.6–15.1)
GFR SERPL CREATININE-BSD FRML MDRD: 38 ML/MIN/1.73SQ M
GLUCOSE SERPL-MCNC: 94 MG/DL (ref 65–140)
HCT VFR BLD AUTO: 41.8 % (ref 36.5–49.3)
HGB BLD-MCNC: 14.5 G/DL (ref 12–17)
IMM GRANULOCYTES # BLD AUTO: 0.03 THOUSAND/UL (ref 0–0.2)
IMM GRANULOCYTES NFR BLD AUTO: 0 % (ref 0–2)
LYMPHOCYTES # BLD AUTO: 1.28 THOUSANDS/ΜL (ref 0.6–4.47)
LYMPHOCYTES NFR BLD AUTO: 17 % (ref 14–44)
MCH RBC QN AUTO: 30.8 PG (ref 26.8–34.3)
MCHC RBC AUTO-ENTMCNC: 34.7 G/DL (ref 31.4–37.4)
MCV RBC AUTO: 89 FL (ref 82–98)
MONOCYTES # BLD AUTO: 0.42 THOUSAND/ΜL (ref 0.17–1.22)
MONOCYTES NFR BLD AUTO: 6 % (ref 4–12)
NEUTROPHILS # BLD AUTO: 5.42 THOUSANDS/ΜL (ref 1.85–7.62)
NEUTS SEG NFR BLD AUTO: 74 % (ref 43–75)
NRBC BLD AUTO-RTO: 0 /100 WBCS
PLATELET # BLD AUTO: 209 THOUSANDS/UL (ref 149–390)
PMV BLD AUTO: 10.7 FL (ref 8.9–12.7)
POTASSIUM SERPL-SCNC: 3.7 MMOL/L (ref 3.5–5.3)
RBC # BLD AUTO: 4.71 MILLION/UL (ref 3.88–5.62)
SODIUM SERPL-SCNC: 138 MMOL/L (ref 136–145)
VIT B12 SERPL-MCNC: 535 PG/ML (ref 100–900)
WBC # BLD AUTO: 7.37 THOUSAND/UL (ref 4.31–10.16)

## 2019-10-07 PROCEDURE — 99222 1ST HOSP IP/OBS MODERATE 55: CPT | Performed by: INTERNAL MEDICINE

## 2019-10-07 PROCEDURE — 85025 COMPLETE CBC W/AUTO DIFF WBC: CPT | Performed by: INTERNAL MEDICINE

## 2019-10-07 PROCEDURE — 80048 BASIC METABOLIC PNL TOTAL CA: CPT | Performed by: INTERNAL MEDICINE

## 2019-10-07 PROCEDURE — 82607 VITAMIN B-12: CPT | Performed by: INTERNAL MEDICINE

## 2019-10-07 PROCEDURE — 99232 SBSQ HOSP IP/OBS MODERATE 35: CPT | Performed by: INTERNAL MEDICINE

## 2019-10-07 RX ADMIN — DEXTROSE AND SODIUM CHLORIDE 75 ML/HR: 5; .45 INJECTION, SOLUTION INTRAVENOUS at 01:33

## 2019-10-07 RX ADMIN — BACITRACIN: 500 OINTMENT OPHTHALMIC at 17:25

## 2019-10-07 RX ADMIN — AMLODIPINE BESYLATE 5 MG: 5 TABLET ORAL at 10:27

## 2019-10-07 RX ADMIN — ACETAMINOPHEN 975 MG: 325 TABLET ORAL at 05:37

## 2019-10-07 RX ADMIN — NYSTATIN: 100000 POWDER TOPICAL at 10:27

## 2019-10-07 RX ADMIN — DOCUSATE SODIUM 100 MG: 100 CAPSULE, LIQUID FILLED ORAL at 17:27

## 2019-10-07 RX ADMIN — DIVALPROEX SODIUM 500 MG: 125 CAPSULE, COATED PELLETS ORAL at 17:23

## 2019-10-07 RX ADMIN — BACITRACIN: 500 OINTMENT OPHTHALMIC at 10:28

## 2019-10-07 RX ADMIN — ACETAMINOPHEN 975 MG: 325 TABLET ORAL at 13:41

## 2019-10-07 RX ADMIN — QUETIAPINE FUMARATE 12.5 MG: 25 TABLET ORAL at 17:25

## 2019-10-07 RX ADMIN — NYSTATIN: 100000 POWDER TOPICAL at 17:27

## 2019-10-07 NOTE — PLAN OF CARE
Problem: Prexisting or High Potential for Compromised Skin Integrity  Goal: Skin integrity is maintained or improved  Description  INTERVENTIONS:  - Identify patients at risk for skin breakdown  - Assess and monitor skin integrity  - Assess and monitor nutrition and hydration status  - Monitor labs   - Assess for incontinence   - Turn and reposition patient  - Assist with mobility/ambulation  - Relieve pressure over bony prominences  - Avoid friction and shearing  - Provide appropriate hygiene as needed including keeping skin clean and dry  - Evaluate need for skin moisturizer/barrier cream  - Collaborate with interdisciplinary team   - Patient/family teaching  - Consider wound care consult   Outcome: Progressing     Problem: Neurological Deficit  Goal: Neurological status is stable or improving  Description  Interventions:  - Monitor and assess patient's level of consciousness, motor function, sensory function, and level of assistance needed for ADLs  - Monitor and report changes from baseline  Collaborate with interdisciplinary team to initiate plan and implement interventions as ordered  - Provide and maintain a safe environment  - Consider seizure precautions  - Consider fall precautions  - Consider aspiration precautions  - Consider bleeding precautions  Outcome: Progressing     Problem: Activity Intolerance/Impaired Mobility  Goal: Mobility/activity is maintained at optimum level for patient  Description  Interventions:  - Assess and monitor patient  barriers to mobility and need for assistive/adaptive devices  - Assess patient's emotional response to limitations  - Collaborate with interdisciplinary team and initiate plans and interventions as ordered  - Encourage independent activity per ability   - Maintain proper body alignment  - Perform active/passive rom as tolerated/ordered    - Plan activities to conserve energy   - Turn patient as appropriate  Outcome: Progressing     Problem: Potential for Aspiration  Goal: Non-ventilated patient's risk of aspiration is minimized  Description  Assess and monitor vital signs, respiratory status, and labs (WBC)  Monitor for signs of aspiration (tachypnea, cough, rales, wheezing, cyanosis, fever)  - Assess and monitor patient's ability to swallow  - Place patient up in chair to eat if possible  - HOB up at 90 degrees to eat if unable to get patient up into chair   - Supervise patient during oral intake  - Instruct patient/ family to take small bites  - Instruct patient/ family to take small single sips when taking liquids  - Follow patient-specific strategies generated by speech pathologist   Outcome: Progressing     Problem: Nutrition  Goal: Nutrition/Hydration status is improving  Description  Monitor and assess patient's nutrition/hydration status for malnutrition (ex- brittle hair, bruises, dry skin, pale skin and conjunctiva, muscle wasting, smooth red tongue, and disorientation)  Collaborate with interdisciplinary team and initiate plan and interventions as ordered  Monitor patient's weight and dietary intake as ordered or per policy  Utilize nutrition screening tool and intervene per policy  Determine patient's food preferences and provide high-protein, high-caloric foods as appropriate  - Assist patient with eating   - Allow adequate time for meals   - Encourage patient to take dietary supplement as ordered  - Collaborate with clinical nutritionist   - Include patient/family/caregiver in decisions related to nutrition    Outcome: Progressing     Problem: NEUROSENSORY - ADULT  Goal: Achieves stable or improved neurological status  Description  INTERVENTIONS  - Monitor and report changes in neurological status  - Monitor vital signs such as temperature, blood pressure, glucose, and any other labs ordered   - Initiate measures to prevent increased intracranial pressure  - Monitor for seizure activity and implement precautions if appropriate Outcome: Progressing  Goal: Achieves maximal functionality and self care  Description  INTERVENTIONS  - Monitor swallowing and airway patency with patient fatigue and changes in neurological status  - Encourage and assist patient to increase activity and self care     - Encourage visually impaired, hearing impaired and aphasic patients to use assistive/communication devices  Outcome: Progressing     Problem: GENITOURINARY - ADULT  Goal: Urinary catheter remains patent  Description  INTERVENTIONS:  - Assess patency of urinary catheter  - If patient has a chronic barrera, consider changing catheter if non-functioning  - Follow guidelines for intermittent irrigation of non-functioning urinary catheter  Outcome: Progressing     Problem: METABOLIC, FLUID AND ELECTROLYTES - ADULT  Goal: Electrolytes maintained within normal limits  Description  INTERVENTIONS:  - Monitor labs and assess patient for signs and symptoms of electrolyte imbalances  - Administer electrolyte replacement as ordered  - Monitor response to electrolyte replacements, including repeat lab results as appropriate  - Instruct patient on fluid and nutrition as appropriate  Outcome: Progressing  Goal: Fluid balance maintained  Description  INTERVENTIONS:  - Monitor labs   - Monitor I/O and WT  - Instruct patient on fluid and nutrition as appropriate  - Assess for signs & symptoms of volume excess or deficit  Outcome: Progressing     Problem: SKIN/TISSUE INTEGRITY - ADULT  Goal: Skin integrity remains intact  Description  INTERVENTIONS  - Identify patients at risk for skin breakdown  - Assess and monitor skin integrity  - Assess and monitor nutrition and hydration status  - Monitor labs (i e  albumin)  - Assess for incontinence   - Turn and reposition patient  - Assist with mobility/ambulation  - Relieve pressure over bony prominences  - Avoid friction and shearing  - Provide appropriate hygiene as needed including keeping skin clean and dry  - Evaluate need for skin moisturizer/barrier cream  - Collaborate with interdisciplinary team (i e  Nutrition, Rehabilitation, etc )   - Patient/family teaching  Outcome: Progressing     Problem: MUSCULOSKELETAL - ADULT  Goal: Maintain or return mobility to safest level of function  Description  INTERVENTIONS:  - Assess patient's ability to carry out ADLs; assess patient's baseline for ADL function and identify physical deficits which impact ability to perform ADLs (bathing, care of mouth/teeth, toileting, grooming, dressing, etc )  - Assess/evaluate cause of self-care deficits   - Assess range of motion  - Assess patient's mobility  - Assess patient's need for assistive devices and provide as appropriate  - Encourage maximum independence but intervene and supervise when necessary  - Involve family in performance of ADLs  - Assess for home care needs following discharge   - Consider OT consult to assist with ADL evaluation and planning for discharge  - Provide patient education as appropriate  Outcome: Progressing     Problem: INFECTION - ADULT  Goal: Absence or prevention of progression during hospitalization  Description  INTERVENTIONS:  - Assess and monitor for signs and symptoms of infection  - Monitor lab/diagnostic results  - Monitor all insertion sites, i e  indwelling lines, tubes, and drains  - [place] appropriate cooling/warming therapies per order  - Administer medications as ordered  - Instruct and encourage patient and family to use good hand hygiene technique  - Identify and instruct in appropriate isolation precautions for identified infection/condition   Outcome: Progressing     Problem: SAFETY ADULT  Goal: Patient will remain free of falls  Description  INTERVENTIONS:  - Assess patient frequently for physical needs  -  Identify cognitive and physical deficits and behaviors that affect risk of falls    -  [place] fall precautions as indicated by assessment   - Educate patient/family on patient safety including physical limitations  - Instruct patient to call for assistance with activity based on assessment  - Modify environment to reduce risk of injury  - Consider OT/PT consult to assist with strengthening/mobility  Outcome: Progressing  Goal: Maintain or return to baseline ADL function  Description  INTERVENTIONS:  -  Assess patient's ability to carry out ADLs; assess patient's baseline for ADL function and identify physical deficits which impact ability to perform ADLs (bathing, care of mouth/teeth, toileting, grooming, dressing, etc )  - Assess/evaluate cause of self-care deficits   - Assess range of motion  - Assess patient's mobility; develop plan if impaired  - Assess patient's need for assistive devices and provide as appropriate  - Encourage maximum independence but intervene and supervise when necessary  - Involve family in performance of ADLs  - Assess for home care needs following discharge   - Consider OT consult to assist with ADL evaluation and planning for discharge  - Provide patient education as appropriate  Outcome: Progressing  Goal: Maintain or return mobility status to optimal level  Description  INTERVENTIONS:  - Assess patient's baseline mobility status (ambulation, transfers, stairs, etc )    - Identify cognitive and physical deficits and behaviors that affect mobility  - Identify mobility aids required to assist with transfers and/or ambulation (gait belt, sit-to-stand, lift, walker, cane, etc )  - [place] fall precautions as indicated by assessment  - Record patient progress and toleration of activity level on Mobility SBAR; progress patient to next Phase/Stage  - Instruct patient to call for assistance with activity based on assessment  - Consider rehabilitation consult to assist with strengthening/weightbearing, etc   Outcome: Progressing     Problem: Knowledge Deficit  Goal: Patient/family/caregiver demonstrates understanding of disease process, treatment plan, medications, and discharge instructions  Description  Complete learning assessment and assess knowledge base  Interventions:  - Provide teaching at level of understanding  - Provide teaching via preferred learning methods  Outcome: Progressing     Problem: Potential for Falls  Goal: Patient will remain free of falls  Description  INTERVENTIONS:  - Assess patient frequently for physical needs  -  Identify cognitive and physical deficits and behaviors that affect risk of falls  -  [place] fall precautions as indicated by assessment   - Educate patient/family on patient safety including physical limitations  - Instruct patient to call for assistance with activity based on assessment  - Modify environment to reduce risk of injury  - Consider OT/PT consult to assist with strengthening/mobility  Outcome: Progressing     Problem: Nutrition/Hydration-ADULT  Goal: Nutrient/Hydration intake appropriate for improving, restoring or maintaining nutritional needs  Description  Monitor and assess patient's nutrition/hydration status for malnutrition  Collaborate with interdisciplinary team and initiate plan and interventions as ordered  Monitor patient's weight and dietary intake as ordered or per policy  Utilize nutrition screening tool and intervene as necessary  Determine patient's food preferences and provide high-protein, high-caloric foods as appropriate       INTERVENTIONS:  - Monitor oral intake, urinary output, labs, and treatment plans  - Assess nutrition and hydration status and recommend course of action  - Evaluate amount of meals eaten  - Assist patient with eating if necessary   - Allow adequate time for meals  - Recommend/ encourage appropriate diets, oral nutritional supplements, and vitamin/mineral supplements  - Order, calculate, and assess calorie counts as needed  - Recommend, monitor, and adjust tube feedings and TPN/PPN based on assessed needs  - Assess need for intravenous fluids  - Provide specific nutrition/hydration education as appropriate  - Include patient/family/caregiver in decisions related to nutrition  Outcome: Progressing     Problem: BEHAVIOR  Goal: Pt/Family maintain appropriate behavior and adhere to behavioral management agreement, if implemented  Description  INTERVENTIONS:  - Assess the family dynamic   - Encourage verbalization of thoughts and concerns in a socially appropriate manner  - Assess patient/family's coping skills and non-compliant behavior (including use of illegal substances)  - Utilize positive, consistent limit setting strategies supporting safety of patient, staff and others  - Initiate consult with Case Management, Spiritual Care or other ancillary services as appropriate  - If a patient's/visitor's behavior jeopardizes the safety of the patient, staff, or others, refer to organization procedure     - Notify Security of behavior or suspected illegal substances which indicate the need for search of the patient and/or belongings  - Encourage participation in the decision making process about a behavioral management agreement; implement if patient meets criteria  Outcome: Progressing

## 2019-10-07 NOTE — CONSULTS
Consultation - Geriatrics   Darian Landrum 80 y o  male MRN: [id number]  [facility] Encounter: [id number]      Assessment/Plan  1  Encephalopathy  Waxing and waning  Periods of combativeness and confusion  Alert and oriented to person only  Provide frequent redirection, reorientation, distraction techniques  Avoid deliriogenic medications such as tramadol, benzodiazepines, anticholinergics,  Benadryl  Treat pain, See geriatric pain protocol  Monitor for constipation and urinary retention  Encourage early and frequent moblization, OOB  Encourage Hydration/ Nutrition  Implement sleep hygiene, limit night time interuptions, group activities  QTc 454  Patient was previously on trazodone and melatonin to help establish circadian rhythm now discontinued  Patient also was trialed on Zyprexa 10 mg but to sedate, now discontinued  Patient ordered Seroquel 12 5 mg p o  Continue Depakote 500 mg at 17:00 for seizure prophylaxis    2  Frailty  Continue fall precautions  Nutritional support  PT/OT  Rehab post hospitalization    3  Hearing impairment  Recommend amplifier  Speak loudly and clearly    4  Ambulatory dysfunction  Fall precautions  Michael belt    5  Subarachnoid hemorrhage  Neurology following          History of Present Illness   Physician Requesting Consult: Esteban Matthews MD  Reason for Consult / Principal Problem:  Delirium encephalopathy, sleeping during the day combative at night  Hx and PE limited by:  Dementia  HPI: Darian Landrum is a 80y o  year old male who presents with aphasia  Patient presented to [address] [place] [address] Emergency Room stroke alert was activated for aphasia  Patient was transferred to [facility] for further evaluation  CT scan showed left central sulcus SAH and possible right frontal SAH  He has a past medical history of hypertension, CAD, hyperlipidemia, arthritis  Prior to arrival patient lives at home with his wife  His son lives next door    His son assist with IADLs, driving and finances  His son takes some grocery shopping  He is able to prepare his own meals  He is independent with ADLs  He ambulates with a walker and cane  He wears glasses  He is hard of hearing, he has a hearing aid for his left ear  Denies issues with bowel or bladder  Upon exam patient is in bed, asleep difficult to awake  Inpatient consult to Gerontology  Consult performed by: MARC Dueñas  Consult ordered by: Dionna Hairston MD          Review of Systems   Unable to perform ROS: Patient unresponsive (reviewed with nursing)   Constitutional: Negative for unexpected weight change  HENT: Negative for hearing loss  Respiratory: Negative for cough  Cardiovascular: Negative for chest pain  Gastrointestinal: Negative for constipation  Genitourinary: Negative for difficulty urinating  Musculoskeletal: Positive for gait problem  Skin: Negative for color change  Neurological: Negative for dizziness  Psychiatric/Behavioral: Positive for behavioral problems  Negative for sleep disturbance  Historical Information   Past Medical History:   Diagnosis Date    Arthritis     Athscl heart disease of native coronary artery w/o ang pctrs     Coronary angioplasty status     HTN (hypertension)     Hx of echocardiogram 04/06/2012    EF 0 65, Normal LV function   Hyperlipidemia     Right bundle branch block     Ventricular premature depolarization      Past Surgical History:   Procedure Laterality Date    CARDIAC CATHETERIZATION  04/04/2012    Single vessel CAD;  Successful bare metal stent placed in the distal RCA     Social History   Social History     Substance and Sexual Activity   Alcohol Use Not Currently     Social History     Substance and Sexual Activity   Drug Use Never     Social History     Tobacco Use   Smoking Status Never Smoker   Smokeless Tobacco Never Used         Family History: non-contributory    Meds/Allergies   Current meds:   Current Facility-Administered Medications   Medication Dose Route Frequency    acetaminophen (TYLENOL) tablet 975 mg  975 mg Oral Q8H [address]    amLODIPine (NORVASC) tablet 5 mg  5 mg Oral Daily    bacitracin ophthalmic ointment   Both Eyes BID    divalproex sodium (DEPAKOTE SPRINKLE) capsule 500 mg  500 mg Oral Daily    docusate sodium (COLACE) capsule 100 mg  100 mg Oral BID    Labetalol HCl (NORMODYNE) injection 5 mg  5 mg Intravenous Q4H PRN    nystatin (MYCOSTATIN) powder   Topical BID    QUEtiapine (SEROquel) tablet 12 5 mg  12 5 mg Oral Daily With Dinner      Current PTA meds:  Medications Prior to Admission   Medication    aspirin 81 MG tablet        No Known Allergies    Objective   Vitals: Blood pressure 129/81, pulse (!) 53, temperature 97 7 °F (36 5 °C), resp  rate 16, height 5' 9" (1 753 m), weight 65 9 kg (145 lb 4 5 oz), SpO2 98 %  ,Body mass index is 21 45 kg/m²  Physical Exam   Constitutional: He appears well-developed and well-nourished  No distress  HENT:   Head: Normocephalic  Eyes: Right eye exhibits no discharge  Left eye exhibits no discharge  Neck: Normal range of motion  Cardiovascular: Normal rate, regular rhythm and normal heart sounds  Exam reveals no gallop and no friction rub  No murmur heard  Pulmonary/Chest: Effort normal and breath sounds normal  No stridor  No respiratory distress  He has no wheezes  Abdominal: Soft  Bowel sounds are normal  He exhibits no distension  There is no tenderness  There is no guarding  Musculoskeletal: Normal range of motion  He exhibits no edema or deformity  Neurological:   lethargic   Skin: Skin is warm and dry  He is not diaphoretic  Psychiatric:   Lethargic at present   Nursing note and vitals reviewed        Lab Results:   Results from last 7 days   Lab Units 10/07/19  0536   WBC Thousand/uL 7 37   HEMOGLOBIN g/dL 14 5   HEMATOCRIT % 41 8   PLATELETS Thousands/uL 209        Results from last 7 days   Lab Units 10/07/19  0536 POTASSIUM mmol/L 3 7   CHLORIDE mmol/L 107   CO2 mmol/L 25   BUN mg/dL 32*   CREATININE mg/dL 1 52*   CALCIUM mg/dL 9 1       Imaging Studies: I have personally reviewed pertinent reports  EKG, Pathology, and Other Studies: I have personally reviewed pertinent reports      VTE Prophylaxis: Sequential compression device (Venodyne)     Code Status: Level 3 - DNAR and DNI

## 2019-10-08 LAB
ALBUMIN SERPL BCP-MCNC: 3 G/DL (ref 3.5–5)
ALP SERPL-CCNC: 90 U/L (ref 46–116)
ALT SERPL W P-5'-P-CCNC: 22 U/L (ref 12–78)
ANION GAP SERPL CALCULATED.3IONS-SCNC: 7 MMOL/L (ref 4–13)
AST SERPL W P-5'-P-CCNC: 15 U/L (ref 5–45)
BILIRUB SERPL-MCNC: 0.62 MG/DL (ref 0.2–1)
BUN SERPL-MCNC: 45 MG/DL (ref 5–25)
CALCIUM SERPL-MCNC: 9.1 MG/DL (ref 8.3–10.1)
CHLORIDE SERPL-SCNC: 109 MMOL/L (ref 100–108)
CO2 SERPL-SCNC: 24 MMOL/L (ref 21–32)
CREAT SERPL-MCNC: 2.02 MG/DL (ref 0.6–1.3)
ERYTHROCYTE [DISTWIDTH] IN BLOOD BY AUTOMATED COUNT: 13.4 % (ref 11.6–15.1)
GFR SERPL CREATININE-BSD FRML MDRD: 27 ML/MIN/1.73SQ M
GLUCOSE SERPL-MCNC: 83 MG/DL (ref 65–140)
HCT VFR BLD AUTO: 39.9 % (ref 36.5–49.3)
HGB BLD-MCNC: 13.4 G/DL (ref 12–17)
MCH RBC QN AUTO: 30.8 PG (ref 26.8–34.3)
MCHC RBC AUTO-ENTMCNC: 33.6 G/DL (ref 31.4–37.4)
MCV RBC AUTO: 92 FL (ref 82–98)
PLATELET # BLD AUTO: 209 THOUSANDS/UL (ref 149–390)
PMV BLD AUTO: 10.8 FL (ref 8.9–12.7)
POTASSIUM SERPL-SCNC: 4.4 MMOL/L (ref 3.5–5.3)
PROT SERPL-MCNC: 6.6 G/DL (ref 6.4–8.2)
RBC # BLD AUTO: 4.35 MILLION/UL (ref 3.88–5.62)
SODIUM SERPL-SCNC: 140 MMOL/L (ref 136–145)
WBC # BLD AUTO: 10.72 THOUSAND/UL (ref 4.31–10.16)

## 2019-10-08 PROCEDURE — 99233 SBSQ HOSP IP/OBS HIGH 50: CPT | Performed by: INTERNAL MEDICINE

## 2019-10-08 PROCEDURE — 85027 COMPLETE CBC AUTOMATED: CPT | Performed by: INTERNAL MEDICINE

## 2019-10-08 PROCEDURE — 80053 COMPREHEN METABOLIC PANEL: CPT | Performed by: INTERNAL MEDICINE

## 2019-10-08 PROCEDURE — 99232 SBSQ HOSP IP/OBS MODERATE 35: CPT | Performed by: INTERNAL MEDICINE

## 2019-10-08 RX ORDER — SODIUM CHLORIDE 9 MG/ML
75 INJECTION, SOLUTION INTRAVENOUS CONTINUOUS
Status: DISPENSED | OUTPATIENT
Start: 2019-10-08 | End: 2019-10-09

## 2019-10-08 RX ADMIN — NYSTATIN: 100000 POWDER TOPICAL at 11:43

## 2019-10-08 RX ADMIN — NYSTATIN: 100000 POWDER TOPICAL at 17:14

## 2019-10-08 RX ADMIN — SODIUM CHLORIDE 75 ML/HR: 0.9 INJECTION, SOLUTION INTRAVENOUS at 16:30

## 2019-10-08 NOTE — PROGRESS NOTES
Progress Note - Lexis Ngo 80 y o  male MRN: [id number]    Unit/Bed#: [facility] 731-01 Encounter: [id number]      Assessment/Plan:  1  Encephalopathy  Waxing waning  Periods of combativeness and confusion  Lethargic at present  Encourage awakened this  Provide frequent redirection, reorientation, distraction techniques  Avoid deliriogenic medications such as tramadol, benzodiazepines, anticholinergics,  Benadryl  Treat pain, See geriatric pain protocol  Monitor for constipation and urinary retention  Encourage early and frequent moblization, OOB  Encourage Hydration/ Nutrition  Implement sleep hygiene, limit night time interuptions, group activities  Patient was previously on trazodone and melatonin to help establish circadian rhythm, now discontinued  Patient was also trialed on Zyprexa 10 mg, now discontinue  Patient ordered Seroquel 12 5 mg p o  With dinner  Continue Depakote 500 mg at 1700 for seizure prophylaxis    2  Frailty  Continue fall precautions  Nutritional support  PT/OT  Rehab post hospitalization    3  Hearing impairment  Recommend amplifier  Speak loudly and clearly    4  Ambulatory dysfunction  Fall precautions  PT/OT    5  Subarachnoid hemorrhage  Neurology following              Subjective:   Upon exam patient lying in bed  He is sleepy, opens eyes briefly to name  Per nursing patient awake for brief periods of time  When awakened, patient agitated    Objective:     Vitals: Blood pressure 106/66, pulse 60, temperature 97 8 °F (36 6 °C), resp  rate 16, height 5' 9" (1 753 m), weight 65 kg (143 lb 4 8 oz), SpO2 97 %  ,Body mass index is 21 16 kg/m²  Intake/Output Summary (Last 24 hours) at 10/8/2019 1110  Last data filed at 10/8/2019 0423  Gross per 24 hour   Intake 60 ml   Output 1050 ml   Net -990 ml       Physical Exam:   General : NAD  HEENT : MMM   Heart : Normal rate, no murmur rub or gallop  Lungs : CTA no wheezes, rales or rhonchi  Abdomen : Soft, NT/ND, BS auscultated in all 4 quads  Ext :  no edema  Skin : Pink, warm, dry, age appropriate turgor and mobility  Neuro : Nonfocal  Psych : lethargic      Invasive Devices     Peripheral Intravenous Line            Peripheral IV 10/07/19 Right;Upper;Ventral (anterior) Arm 1 day          Drain            Urethral Catheter Straight-tip 16 Fr  13 days                Lab, Imaging and other studies: I have personally reviewed pertinent reports      VTE Pharmacologic Prophylaxis: Sequential compression device (Venodyne)   VTE Mechanical Prophylaxis: sequential compression device

## 2019-10-08 NOTE — PROGRESS NOTES
Progress Note Griselda Little 1/30/1924, 80 y o  male MRN: [id number]    Unit/Bed#: [facility] 731-01 Encounter: [id number]    Primary Care Provider: Aubrey Rojas DO   Date and time admitted to hospital: 9/22/2019  6:37 PM        * SAH (subarachnoid hemorrhage) [facility])  Assessment & Plan  NO AP or AC  MRI and CT head shows stable hemorrhage  Continue PT/OT  Fall precaution    Encephalopathy  Assessment & Plan  The patient had combatitiveness and confusion over the weekend  Sleeping during the day and confused and awake at night    Has not been eating during the day and developed dehydration  Today he is awake and more alter after starting seroquel, will continue to monitor  Called pt's son to invite family to visit to help with orientation  Failed trials of melatonin, trazodone and olanzapine  Continue seroquel 12 5mg, consider increase to 25mg if he doesn't sleep tonight      Urinary retention  Assessment & Plan  The patient has had Barrera catheter placed twice as he has failed voiding trials  Continue barrera catheter for now until more mobile and alert    CKD (chronic kidney disease) stage 3, GFR 30-59 ml/min ([facility])  Assessment & Plan  Unknown baseline renal function, continue to monitor BMP    FRANCOIS (acute kidney injury) ([facility] [address] )  Assessment & Plan  Developed FRANCOIS rapidly if not eating or drinking due to sedation  Continue IVF until eating and drinking consistently    A-fib [facility])  Assessment & Plan  Currently sinus  No rate control medicines needed  No AC due to bleed    Coronary artery disease involving native coronary artery of native heart without angina pectoris  Assessment & Plan  S/p remote bare metal stent  Pt on no meds at home    Essential hypertension  Assessment & Plan  Continue Norvasc  Goal SBP < 140  Labetalol prn        VTE Pharmacologic Prophylaxis:   Pharmacologic: Heparin  Mechanical VTE Prophylaxis in Place: Yes    Patient Centered Rounds: I have performed bedside rounds with nursing staff today     Discussions with Specialists or Other Care Team Provider: geriatrics    Education and Discussions with Family / Patient: patient and son, plan of care    Time Spent for Care: 20 minutes  More than 50% of total time spent on counseling and coordination of care as described above  Current Length of Stay: 15 day(s)    Current Patient Status: Inpatient   Certification Statement: The patient will continue to require additional inpatient hospital stay due to delerium    Discharge Plan: inpt rehab when stabe    Code Status: Level 3 - DNAR and DNI      Subjective:   Limited due to confusion  Pt is responding to questions, simple commands  Denies any acute complaints  Better attention today  Objective:     Vitals:   Temp (24hrs), Av °F (36 7 °C), Min:97 7 °F (36 5 °C), Max:98 2 °F (36 8 °C)    Temp:  [97 7 °F (36 5 °C)-98 2 °F (36 8 °C)] 97 7 °F (36 5 °C)  HR:  [53-90] 66  Resp:  [14-20] 16  BP: (129-167)/() 134/84  SpO2:  [98 %-99 %] 99 %  Body mass index is 21 45 kg/m²  Input and Output Summary (last 24 hours): Intake/Output Summary (Last 24 hours) at 10/7/2019 2019  Last data filed at 10/7/2019 1459  Gross per 24 hour   Intake 1152 5 ml   Output 2250 ml   Net -1097 5 ml       Physical Exam:     Physical Exam   Constitutional:   Thin elderly male, lying in bed   HENT:   Head: Normocephalic and atraumatic  Eyes: Pupils are equal, round, and reactive to light  EOM are normal    Neck: Neck supple  Cardiovascular: Normal rate and regular rhythm  Pulmonary/Chest: Effort normal    Abdominal: Soft  Genitourinary:   Genitourinary Comments: Hoffmann catheter   Neurological: He is alert     Oriented x1  Attention improved  Moving all extremities   Skin:   Multiple abrasions         Additional Data:     Labs:    Results from last 7 days   Lab Units 10/07/19  0536   WBC Thousand/uL 7 37   HEMOGLOBIN g/dL 14 5   HEMATOCRIT % 41 8   PLATELETS Thousands/uL 209   NEUTROS PCT % 74   LYMPHS PCT % 17 MONOS PCT % 6   EOS PCT % 2     Results from last 7 days   Lab Units 10/07/19  0536   SODIUM mmol/L 138   POTASSIUM mmol/L 3 7   CHLORIDE mmol/L 107   CO2 mmol/L 25   BUN mg/dL 32*   CREATININE mg/dL 1 52*   ANION GAP mmol/L 6   CALCIUM mg/dL 9 1   GLUCOSE RANDOM mg/dL 94                           * I Have Reviewed All Lab Data Listed Above  * Additional Pertinent Lab Tests Reviewed: All Labs Within Last 24 Hours Reviewed      Recent Cultures (last 7 days):           Last 24 Hours Medication List:     Current Facility-Administered Medications:  acetaminophen 975 mg Oral [facility] Tuesday M MARC Moreno   amLODIPine 5 mg Oral Daily Karlaeder Renner,    divalproex sodium 500 mg Oral Daily Hazel Lanes, MD   docusate sodium 100 mg Oral BID Hazel Lanes, MD   Labetalol HCl 5 mg Intravenous Q4H PRN Mohan Plata MD   nystatin  Topical BID Verenice Lemos DO   QUEtiapine 12 5 mg Oral Daily With Lavaun Angelucci, MD        Today, Patient Was Seen By: Bonnie Luke MD    ** Please Note: Dictation voice to text software may have been used in the creation of this document   **

## 2019-10-08 NOTE — PLAN OF CARE
Problem: Prexisting or High Potential for Compromised Skin Integrity  Goal: Skin integrity is maintained or improved  Description  INTERVENTIONS:  - Identify patients at risk for skin breakdown  - Assess and monitor skin integrity  - Assess and monitor nutrition and hydration status  - Monitor labs   - Assess for incontinence   - Turn and reposition patient  - Assist with mobility/ambulation  - Relieve pressure over bony prominences  - Avoid friction and shearing  - Provide appropriate hygiene as needed including keeping skin clean and dry  - Evaluate need for skin moisturizer/barrier cream  - Collaborate with interdisciplinary team   - Patient/family teaching  - Consider wound care consult   Outcome: Progressing     Problem: Neurological Deficit  Goal: Neurological status is stable or improving  Description  Interventions:  - Monitor and assess patient's level of consciousness, motor function, sensory function, and level of assistance needed for ADLs  - Monitor and report changes from baseline  Collaborate with interdisciplinary team to initiate plan and implement interventions as ordered  - Provide and maintain a safe environment  - Consider seizure precautions  - Consider fall precautions  - Consider aspiration precautions  - Consider bleeding precautions  Outcome: Progressing     Problem: Activity Intolerance/Impaired Mobility  Goal: Mobility/activity is maintained at optimum level for patient  Description  Interventions:  - Assess and monitor patient  barriers to mobility and need for assistive/adaptive devices  - Assess patient's emotional response to limitations  - Collaborate with interdisciplinary team and initiate plans and interventions as ordered  - Encourage independent activity per ability   - Maintain proper body alignment  - Perform active/passive rom as tolerated/ordered    - Plan activities to conserve energy   - Turn patient as appropriate  Outcome: Progressing     Problem: Potential for Aspiration  Goal: Non-ventilated patient's risk of aspiration is minimized  Description  Assess and monitor vital signs, respiratory status, and labs (WBC)  Monitor for signs of aspiration (tachypnea, cough, rales, wheezing, cyanosis, fever)  - Assess and monitor patient's ability to swallow  - Place patient up in chair to eat if possible  - HOB up at 90 degrees to eat if unable to get patient up into chair   - Supervise patient during oral intake  - Instruct patient/ family to take small bites  - Instruct patient/ family to take small single sips when taking liquids  - Follow patient-specific strategies generated by speech pathologist   Outcome: Progressing     Problem: Nutrition  Goal: Nutrition/Hydration status is improving  Description  Monitor and assess patient's nutrition/hydration status for malnutrition (ex- brittle hair, bruises, dry skin, pale skin and conjunctiva, muscle wasting, smooth red tongue, and disorientation)  Collaborate with interdisciplinary team and initiate plan and interventions as ordered  Monitor patient's weight and dietary intake as ordered or per policy  Utilize nutrition screening tool and intervene per policy  Determine patient's food preferences and provide high-protein, high-caloric foods as appropriate  - Assist patient with eating   - Allow adequate time for meals   - Encourage patient to take dietary supplement as ordered  - Collaborate with clinical nutritionist   - Include patient/family/caregiver in decisions related to nutrition    Outcome: Progressing     Problem: NEUROSENSORY - ADULT  Goal: Achieves stable or improved neurological status  Description  INTERVENTIONS  - Monitor and report changes in neurological status  - Monitor vital signs such as temperature, blood pressure, glucose, and any other labs ordered   - Initiate measures to prevent increased intracranial pressure  - Monitor for seizure activity and implement precautions if appropriate Outcome: Progressing  Goal: Achieves maximal functionality and self care  Description  INTERVENTIONS  - Monitor swallowing and airway patency with patient fatigue and changes in neurological status  - Encourage and assist patient to increase activity and self care     - Encourage visually impaired, hearing impaired and aphasic patients to use assistive/communication devices  Outcome: Progressing     Problem: GENITOURINARY - ADULT  Goal: Urinary catheter remains patent  Description  INTERVENTIONS:  - Assess patency of urinary catheter  - If patient has a chronic barrera, consider changing catheter if non-functioning  - Follow guidelines for intermittent irrigation of non-functioning urinary catheter  Outcome: Progressing     Problem: METABOLIC, FLUID AND ELECTROLYTES - ADULT  Goal: Electrolytes maintained within normal limits  Description  INTERVENTIONS:  - Monitor labs and assess patient for signs and symptoms of electrolyte imbalances  - Administer electrolyte replacement as ordered  - Monitor response to electrolyte replacements, including repeat lab results as appropriate  - Instruct patient on fluid and nutrition as appropriate  Outcome: Progressing  Goal: Fluid balance maintained  Description  INTERVENTIONS:  - Monitor labs   - Monitor I/O and WT  - Instruct patient on fluid and nutrition as appropriate  - Assess for signs & symptoms of volume excess or deficit  Outcome: Progressing     Problem: SKIN/TISSUE INTEGRITY - ADULT  Goal: Skin integrity remains intact  Description  INTERVENTIONS  - Identify patients at risk for skin breakdown  - Assess and monitor skin integrity  - Assess and monitor nutrition and hydration status  - Monitor labs (i e  albumin)  - Assess for incontinence   - Turn and reposition patient  - Assist with mobility/ambulation  - Relieve pressure over bony prominences  - Avoid friction and shearing  - Provide appropriate hygiene as needed including keeping skin clean and dry  - Evaluate need for skin moisturizer/barrier cream  - Collaborate with interdisciplinary team (i e  Nutrition, Rehabilitation, etc )   - Patient/family teaching  Outcome: Progressing     Problem: MUSCULOSKELETAL - ADULT  Goal: Maintain or return mobility to safest level of function  Description  INTERVENTIONS:  - Assess patient's ability to carry out ADLs; assess patient's baseline for ADL function and identify physical deficits which impact ability to perform ADLs (bathing, care of mouth/teeth, toileting, grooming, dressing, etc )  - Assess/evaluate cause of self-care deficits   - Assess range of motion  - Assess patient's mobility  - Assess patient's need for assistive devices and provide as appropriate  - Encourage maximum independence but intervene and supervise when necessary  - Involve family in performance of ADLs  - Assess for home care needs following discharge   - Consider OT consult to assist with ADL evaluation and planning for discharge  - Provide patient education as appropriate  Outcome: Progressing     Problem: PAIN - ADULT  Goal: Verbalizes/displays adequate comfort level or baseline comfort level  Description  Interventions:  - Encourage patient to monitor pain and request assistance  - Assess pain using appropriate pain scale  - Administer analgesics based on type and severity of pain and evaluate response  - Implement non-pharmacological measures as appropriate and evaluate response  - Consider cultural and social influences on pain and pain management  - Notify physician/advanced practitioner if interventions unsuccessful or patient reports new pain  Outcome: Progressing     Problem: INFECTION - ADULT  Goal: Absence or prevention of progression during hospitalization  Description  INTERVENTIONS:  - Assess and monitor for signs and symptoms of infection  - Monitor lab/diagnostic results  - Monitor all insertion sites, i e  indwelling lines, tubes, and drains  - [place] appropriate cooling/warming therapies per order  - Administer medications as ordered  - Instruct and encourage patient and family to use good hand hygiene technique  - Identify and instruct in appropriate isolation precautions for identified infection/condition   Outcome: Progressing     Problem: SAFETY ADULT  Goal: Patient will remain free of falls  Description  INTERVENTIONS:  - Assess patient frequently for physical needs  -  Identify cognitive and physical deficits and behaviors that affect risk of falls    -  [place] fall precautions as indicated by assessment   - Educate patient/family on patient safety including physical limitations  - Instruct patient to call for assistance with activity based on assessment  - Modify environment to reduce risk of injury  - Consider OT/PT consult to assist with strengthening/mobility  Outcome: Progressing  Goal: Maintain or return to baseline ADL function  Description  INTERVENTIONS:  -  Assess patient's ability to carry out ADLs; assess patient's baseline for ADL function and identify physical deficits which impact ability to perform ADLs (bathing, care of mouth/teeth, toileting, grooming, dressing, etc )  - Assess/evaluate cause of self-care deficits   - Assess range of motion  - Assess patient's mobility; develop plan if impaired  - Assess patient's need for assistive devices and provide as appropriate  - Encourage maximum independence but intervene and supervise when necessary  - Involve family in performance of ADLs  - Assess for home care needs following discharge   - Consider OT consult to assist with ADL evaluation and planning for discharge  - Provide patient education as appropriate  Outcome: Progressing  Goal: Maintain or return mobility status to optimal level  Description  INTERVENTIONS:  - Assess patient's baseline mobility status (ambulation, transfers, stairs, etc )    - Identify cognitive and physical deficits and behaviors that affect mobility  - Identify mobility aids required to assist with transfers and/or ambulation (gait belt, sit-to-stand, lift, walker, cane, etc )  - [place] fall precautions as indicated by assessment  - Record patient progress and toleration of activity level on Mobility SBAR; progress patient to next Phase/Stage  - Instruct patient to call for assistance with activity based on assessment  - Consider rehabilitation consult to assist with strengthening/weightbearing, etc   Outcome: Progressing     Problem: DISCHARGE PLANNING  Goal: Discharge to home or other facility with appropriate resources  Description  INTERVENTIONS:  - Identify barriers to discharge w/patient and caregiver  - Arrange for needed discharge resources and transportation as appropriate  - Identify discharge learning needs (meds, wound care, etc )  - Arrange for interpretive services to assist at discharge as needed  - Refer to Case Management Department for coordinating discharge planning if the patient needs post-hospital services based on physician/advanced practitioner order or complex needs related to functional status, cognitive ability, or social support system  Outcome: Progressing     Problem: Knowledge Deficit  Goal: Patient/family/caregiver demonstrates understanding of disease process, treatment plan, medications, and discharge instructions  Description  Complete learning assessment and assess knowledge base  Interventions:  - Provide teaching at level of understanding  - Provide teaching via preferred learning methods  Outcome: Progressing     Problem: Potential for Falls  Goal: Patient will remain free of falls  Description  INTERVENTIONS:  - Assess patient frequently for physical needs  -  Identify cognitive and physical deficits and behaviors that affect risk of falls    -  [place] fall precautions as indicated by assessment   - Educate patient/family on patient safety including physical limitations  - Instruct patient to call for assistance with activity based on assessment  - Modify environment to reduce risk of injury  - Consider OT/PT consult to assist with strengthening/mobility  Outcome: Progressing     Problem: Nutrition/Hydration-ADULT  Goal: Nutrient/Hydration intake appropriate for improving, restoring or maintaining nutritional needs  Description  Monitor and assess patient's nutrition/hydration status for malnutrition  Collaborate with interdisciplinary team and initiate plan and interventions as ordered  Monitor patient's weight and dietary intake as ordered or per policy  Utilize nutrition screening tool and intervene as necessary  Determine patient's food preferences and provide high-protein, high-caloric foods as appropriate       INTERVENTIONS:  - Monitor oral intake, urinary output, labs, and treatment plans  - Assess nutrition and hydration status and recommend course of action  - Evaluate amount of meals eaten  - Assist patient with eating if necessary   - Allow adequate time for meals  - Recommend/ encourage appropriate diets, oral nutritional supplements, and vitamin/mineral supplements  - Order, calculate, and assess calorie counts as needed  - Recommend, monitor, and adjust tube feedings and TPN/PPN based on assessed needs  - Assess need for intravenous fluids  - Provide specific nutrition/hydration education as appropriate  - Include patient/family/caregiver in decisions related to nutrition  Outcome: Progressing     Problem: CONFUSION/THOUGHT DISTURBANCE  Goal: Thought disturbances (confusion, delirium, depression, dementia or psychosis) are managed to maintain or return to baseline mental status and functional level  Description  INTERVENTIONS:  - Assess for possible contributors to  thought disturbance, including but not limited to medications, infection, impaired vision or hearing, underlying metabolic abnormalities, dehydration, respiratory compromise,  psychiatric diagnoses and notify attending PHYSICAN/AP  - Monitor and intervene to maintain adequate nutrition, hydration, elimination, sleep and activity  - Decrease environmental stimuli, including noise as appropriate  - Provide frequent contacts to provide refocusing, direction and reassurance as needed  Approach patient calmly with eye contact and at their level  - [place] high risk fall precautions, aspiration precautions and other safety measures, as indicated  - If delirium suspected, notify physician/AP of change in condition and request immediate in-person evaluation  - Pursue consults as appropriate including Geriatric (campus dependent), OT for cognitive evaluation/activity planning, psychiatric, pastoral care, etc   Outcome: Progressing     Problem: BEHAVIOR  Goal: Pt/Family maintain appropriate behavior and adhere to behavioral management agreement, if implemented  Description  INTERVENTIONS:  - Assess the family dynamic   - Encourage verbalization of thoughts and concerns in a socially appropriate manner  - Assess patient/family's coping skills and non-compliant behavior (including use of illegal substances)  - Utilize positive, consistent limit setting strategies supporting safety of patient, staff and others  - Initiate consult with Case Management, Spiritual Care or other ancillary services as appropriate  - If a patient's/visitor's behavior jeopardizes the safety of the patient, staff, or others, refer to organization procedure     - Notify Security of behavior or suspected illegal substances which indicate the need for search of the patient and/or belongings  - Encourage participation in the decision making process about a behavioral management agreement; implement if patient meets criteria  Outcome: Progressing

## 2019-10-08 NOTE — PHYSICAL THERAPY NOTE
Physical Therapy Cancellation Note  Attempted to see Pt for PT treatment this afternoon  Per RN appropriate to see Pt  However, Pt was extremely lethargic and unable to wake Pt up from deep sleep   Will re-attempt tomorrow if time permits    Loki MARYJANE Contreras, PT

## 2019-10-08 NOTE — ASSESSMENT & PLAN NOTE
Developed FRANCOIS rapidly if not eating or drinking due to sedation  Continue IVF until eating and drinking consistently

## 2019-10-08 NOTE — QUICK NOTE
Attempted to meet with patient and family to discuss stroke education  Patient contused, no family present at this time  Will follow up

## 2019-10-08 NOTE — ASSESSMENT & PLAN NOTE
The patient had combatitiveness and confusion over the weekend  Sleeping during the day and confused and awake at night    Has not been eating during the day and developed dehydration  Today he is awake and more alter after starting seroquel, will continue to monitor  Called pt's son to invite family to visit to help with orientation  Failed trials of melatonin, trazodone and olanzapine  Continue seroquel 12 5mg, consider increase to 25mg if he doesn't sleep tonight

## 2019-10-08 NOTE — RESTORATIVE TECHNICIAN NOTE
Restorative Specialist Mobility Note                      Repositioned: Other (Comment)(Rep /sat pt upright in bed  Bed alarm on   Pt callbell, phone/tray within reach )    Shawn CHOWDARY, Restorative Technician, [employer]

## 2019-10-08 NOTE — PROGRESS NOTES
Progress Note Jj Ortega 1/30/1924, 80 y o  male MRN: [id number]    Unit/Bed#: [facility] 731-01 Encounter: [id number]    Primary Care Provider: Elsie Santiago DO   Date and time admitted to hospital: 9/22/2019  6:37 PM        * SAH (subarachnoid hemorrhage) ([facility] )  Assessment & Plan  NO AP or AC  MRI and CT head shows stable hemorrhage  Continue PT/OT  Fall precaution    Encephalopathy  Assessment & Plan  Due to patient's combativeness and confusion over the weekend and altered sleep-wake cycle he was started on Seroquel  Patient has been somnolent since yesterday  Discussed with Geriatrics service likely due to combination kidney disease with addition Seroquel  Will check labs and stop Seroquel  IV hydration  Maintain circadian rhythm      FRANCOIS (acute kidney injury) ([facility] )  Assessment & Plan  Developed FRANCOIS rapidly if not eating or drinking due to sedation  Continue IVF until eating and drinking consistently    A-fib [facility])  Assessment & Plan  Currently sinus  No rate control medicines needed  No AC due to bleed    Urinary retention  Assessment & Plan  The patient has had Barrera catheter placed twice as he has failed voiding trials  Continue barrera catheter for now until more mobile and alert    CKD (chronic kidney disease) stage 3, GFR 30-59 ml/min ([facility])  Assessment & Plan  Unknown baseline renal function, continue to monitor BMP    Coronary artery disease involving native coronary artery of native heart without angina pectoris  Assessment & Plan  S/p remote bare metal stent  Pt on no meds at home    Essential hypertension  Assessment & Plan  Continue Norvasc  Goal SBP < 140  Labetalol prn      VTE Pharmacologic Prophylaxis:   Pharmacologic: Pharmacologic VTE Prophylaxis contraindicated due to Subarachnoid hemorrhage  Mechanical VTE Prophylaxis in Place: Yes    Patient Centered Rounds: I have performed bedside rounds with nursing staff today      Discussions with Specialists or Other Care Team Provider:  Geriatric service    Education and Discussions with Family / Patient:  Patient is too somnolent    Time Spent for Care: 30 minutes  More than 50% of total time spent on counseling and coordination of care as described above  Current Length of Stay: 16 day(s)    Current Patient Status: Inpatient   Certification Statement: The patient will continue to require additional inpatient hospital stay due to Titration of medicines    Discharge Plan:  Likely discharge to rehab tomorrow    Code Status: Level 3 - DNAR and DNI      Subjective:   No acute overnight events  This morning patient is extremely lethargic  Nurse at bedside states that he has been somnolent since yesterday  Objective:     Vitals:   Temp (24hrs), Av 9 °F (36 6 °C), Min:97 6 °F (36 4 °C), Max:98 4 °F (36 9 °C)    Temp:  [97 6 °F (36 4 °C)-98 4 °F (36 9 °C)] 98 4 °F (36 9 °C)  HR:  [56-60] 56  Resp:  [16] 16  BP: ()/(49-66) 90/49  SpO2:  [97 %-98 %] 98 %  Body mass index is 21 16 kg/m²  Input and Output Summary (last 24 hours): Intake/Output Summary (Last 24 hours) at 10/8/2019 1725  Last data filed at 10/8/2019 0423  Gross per 24 hour   Intake    Output 300 ml   Net -300 ml       Physical Exam:     Physical Exam   Constitutional:   Cachectic   HENT:   Head: Normocephalic and atraumatic  Mouth/Throat: Oropharynx is clear and moist    Eyes: Pupils are equal, round, and reactive to light  Conjunctivae are normal    Neck: Neck supple  No JVD present  Cardiovascular: Normal rate, regular rhythm, normal heart sounds and intact distal pulses  Pulmonary/Chest: Effort normal and breath sounds normal    Abdominal: Soft  Bowel sounds are normal    Musculoskeletal: He exhibits no edema  Neurological:   Somnolent  Unable to assess   Skin: Skin is warm and dry  Capillary refill takes less than 2 seconds  Psychiatric:   Unable to assess   Nursing note and vitals reviewed          Additional Data:     Labs:    Results from last 7 days   Lab Units 10/08/19  1619 10/07/19  0536   WBC Thousand/uL 10 72* 7 37   HEMOGLOBIN g/dL 13 4 14 5   HEMATOCRIT % 39 9 41 8   PLATELETS Thousands/uL 209 209   NEUTROS PCT %  --  74   LYMPHS PCT %  --  17   MONOS PCT %  --  6   EOS PCT %  --  2     Results from last 7 days   Lab Units 10/07/19  0536   SODIUM mmol/L 138   POTASSIUM mmol/L 3 7   CHLORIDE mmol/L 107   CO2 mmol/L 25   BUN mg/dL 32*   CREATININE mg/dL 1 52*   ANION GAP mmol/L 6   CALCIUM mg/dL 9 1   GLUCOSE RANDOM mg/dL 94                           * I Have Reviewed All Lab Data Listed Above  * Additional Pertinent Lab Tests Reviewed: Trevor 66 Admission Reviewed    Imaging:    Imaging Reports Reviewed Today Include:  None  Imaging Personally Reviewed by Myself Includes:  None    Recent Cultures (last 7 days):           Last 24 Hours Medication List:     Current Facility-Administered Medications:  acetaminophen 975 mg Oral [facility] [address] Tuesday M MARC Moreno    amLODIPine 5 mg Oral Daily Larry Aguero DO    divalproex sodium 500 mg Oral Daily Daryn Hill MD    docusate sodium 100 mg Oral BID Daryn Hill MD    Labetalol HCl 5 mg Intravenous Q4H PRN Janna Stahl MD    nystatin  Topical BID Komal Hummel DO    sodium chloride 75 mL/hr Intravenous Continuous Janna Stahl MD Last Rate: 75 mL/hr (10/08/19 1630)        Today, Patient Was Seen By: Janna Stahl MD    ** Please Note: Dictation voice to text software may have been used in the creation of this document   **

## 2019-10-08 NOTE — ASSESSMENT & PLAN NOTE
Due to patient's combativeness and confusion over the weekend and altered sleep-wake cycle he was started on Seroquel  Patient has been somnolent since yesterday  Discussed with Geriatrics service likely due to combination kidney disease with addition Seroquel  Will check labs and stop Seroquel  IV hydration  Maintain circadian rhythm

## 2019-10-09 PROCEDURE — 97168 OT RE-EVAL EST PLAN CARE: CPT

## 2019-10-09 PROCEDURE — 99232 SBSQ HOSP IP/OBS MODERATE 35: CPT | Performed by: INTERNAL MEDICINE

## 2019-10-09 PROCEDURE — G8988 SELF CARE GOAL STATUS: HCPCS

## 2019-10-09 PROCEDURE — 97530 THERAPEUTIC ACTIVITIES: CPT

## 2019-10-09 PROCEDURE — G8978 MOBILITY CURRENT STATUS: HCPCS

## 2019-10-09 PROCEDURE — 97164 PT RE-EVAL EST PLAN CARE: CPT

## 2019-10-09 PROCEDURE — 99233 SBSQ HOSP IP/OBS HIGH 50: CPT | Performed by: INTERNAL MEDICINE

## 2019-10-09 PROCEDURE — G8979 MOBILITY GOAL STATUS: HCPCS

## 2019-10-09 PROCEDURE — G8989 SELF CARE D/C STATUS: HCPCS

## 2019-10-09 RX ADMIN — NYSTATIN: 100000 POWDER TOPICAL at 17:52

## 2019-10-09 RX ADMIN — DIVALPROEX SODIUM 500 MG: 125 CAPSULE, COATED PELLETS ORAL at 17:50

## 2019-10-09 RX ADMIN — DOCUSATE SODIUM 100 MG: 100 CAPSULE, LIQUID FILLED ORAL at 17:50

## 2019-10-09 RX ADMIN — ACETAMINOPHEN 975 MG: 325 TABLET ORAL at 22:54

## 2019-10-09 RX ADMIN — DOCUSATE SODIUM 100 MG: 100 CAPSULE, LIQUID FILLED ORAL at 09:12

## 2019-10-09 RX ADMIN — ACETAMINOPHEN 975 MG: 325 TABLET ORAL at 14:37

## 2019-10-09 NOTE — PROGRESS NOTES
Pt noted to be increasingly uncooperative, unable to follow commands, and yanking at barrera  Unable to redirect or reorient patient  Provider made aware  Bilateral soft wrists reordered  Will continue to monitor

## 2019-10-09 NOTE — PROGRESS NOTES
Despite wrist restraomts pt continues to get his statlok off constantly, have now replaced it 4 times so far, continuing to check on him constantly

## 2019-10-09 NOTE — OCCUPATIONAL THERAPY NOTE
OccupationalTherapy Re-Evaluation/Progress Note     Patient Name: Blane Green  BLWLQ'X Date: 10/9/2019  Problem List  Principal Problem:    SAH (subarachnoid hemorrhage) ([facility] )  Active Problems:    Essential hypertension    Coronary artery disease involving native coronary artery of native heart without angina pectoris    A-fib (HCC)    Encephalopathy    FRANCOIS (acute kidney injury) ([facility] )    CKD (chronic kidney disease) stage 3, GFR 30-59 ml/min ([facility] )    Urinary retention    Past Medical History  Past Medical History:   Diagnosis Date    Arthritis     Athscl heart disease of native coronary artery w/o ang pctrs     Coronary angioplasty status     HTN (hypertension)     Hx of echocardiogram 04/06/2012    EF 0 65, Normal LV function   Hyperlipidemia     Right bundle branch block     Ventricular premature depolarization      Past Surgical History  Past Surgical History:   Procedure Laterality Date    CARDIAC CATHETERIZATION  04/04/2012    Single vessel CAD; Successful bare metal stent placed in the distal RCA            10/09/19 1222   Note Type   Note type Re-eval   Restrictions/Precautions   Weight Bearing Precautions Per Order No   Other Precautions Cognitive; Chair Alarm; Restraints; Bed Alarm; Fall Risk  (Urethral catheter)   Pain Assessment   Pain Assessment No/denies pain   Pain Score No Pain   Home Living   Type of Home House   Home Layout Two level; Able to live on main level with bedroom/bathroom; Ramped entrance   Bathroom Shower/Tub Tub/shower unit   [address] [place] [address]  chair   Home Equipment Walker;Cane   Prior Function   Level of [place] Independent with ADLs and functional mobility; Needs assistance with IADLs   Lives With Spouse   Receives Help From Family   ADL Assistance Independent   IADLs Needs assistance   Falls in the last 6 months >10   Vocational Retired   Comments Home set-up/prior function from initial evaluation     Lifestyle   Autonomy I ADLS, assist IADLS, and Mod I w/ transfers and functional mobility PTA   Reciprocal Relationships Pt lives w/ spouse; has son and dght in law next door   Service to Others Pt is retired   Intrinsic Gratification Pt enjoys gardening   Psychosocial   Psychosocial (WDL) X   Patient Behaviors/Mood Anxious; Non-compliant;Brightens with approach   ADL   Where Assessed Edge of bed   Eating Assistance 5  Supervision/Setup   Eating Deficit Setup   Grooming Assistance 4  Minimal Assistance   Grooming Deficit Setup;Brushing hair  (for thoroughness)   UB Bathing Assistance 4  Minimal Assistance   LB Bathing Assistance 3  Moderate Assistance   UB Dressing Assistance 3  Moderate [address] 2  Maximal Assistance   LB Dressing Deficit Don/doff R sock; Don/doff L sock   Toileting Assistance  2  Maximal Assistance   Additional Comments pt confused/cognitive deficits/fatigue limited self care [place]  Bed Mobility   Supine to Sit 2  Maximal assistance   Additional items Assist x 2, HOB elevated   Sit to Supine 2  Maximal assistance   Additional items Assist x 2   Additional Comments pt repositioned with restraints donned in supine with head of bed elevated   Transfers   Sit to Stand 2  Maximal assistance   Additional items Assist x 2   Stand to Sit 2  Maximal assistance   Additional items Assist x 2   Additional Comments Static sitting balance mod a  progressing to min a and S with verbal cues, dynamic sitting balance with CG with grooming tasks, 3 trials of sit to stand transfers progressing to mod a x 2 and verbal cues for safety  Significant posterior lean at times with sitting and standing tasks     Functional Mobility   Functional Mobility 3 moderate assistance   Additional Comments Mod  a x 2 without AD 2 small steps to [facility]    Balance   Static Sitting Poor +   Dynamic Sitting Poor +   Static Standing Poor   Dynamic Standing Poor -   Activity Tolerance   Activity Tolerance Patient limited by fatigue;Treatment limited secondary to medical complications (Comment)  (cognitive deficits/confusion)   Medical Staff Made Aware PT   Nurse Made Aware RN cleared pt for therapy   RUE Assessment   RUE Assessment X  (limited shoulder AROM and AAROM  0-90* , otherwise at least 3+/5 throughout   LUE Assessment   LUE Assessment X at least 3+/5 grossly throughout    Hand Function   Gross Motor Coordination Functional   Fine Motor Coordination Impaired  (tremor noted)   Sensation   Light Touch No apparent deficits   Vision-Basic Assessment   Current Vision Wears glasses all the time   Cognition   Overall Cognitive Status Impaired   Arousal/Participation Alert; Cooperative   Attention Difficulty attending to directions   Orientation Level Oriented to person   Memory Decreased long term memory;Decreased recall of biographical information;Decreased short term memory;Decreased recall of recent events;Decreased recall of precautions   Following Commands Follows one step commands inconsistently   Comments pt is confused, tangential speech/conversation, distractible, verbal cues for safety/repitition for 1-2 step direction following  Assessment   Limitation Decreased ADL status; Decreased Safe judgement during ADL;Decreased cognition;Decreased endurance;Decreased self-care trans;Decreased high-level ADLs; Decreased fine motor control   Prognosis Fair   Assessment Pt seen in this date for a re-evaluation and is a 80 y o  male who was admitted to [facility] on 9/22/2019 with SAH (subarachnoid hemorrhage) ([facility] )   Pt's problem list also includes PMH of mixed hyperlipidemia, CKD stage 3, CAD, HTN  See initial evaluation for full social history/home set-up/prior baseline function  Currently pt requires mod a UB, max a LB for overall ADLS and max a x 2 transfers/min/mod a x 2 functional mobility   Pt currently presents with impairments in the following categories -difficulty performing ADLS, difficulty performing IADLS , limited insight into deficits, compliance and decreased initiation and engagement  activity tolerance, endurance, standing balance/tolerance, sitting balance/tolerance, memory, insight, safety , judgement , attention , sequencing , communication and interpersonal skills  These impairments, as well as pt's fatigue and risk for falls  limit pt's ability to safely engage in all baseline areas of occupation, includingeating, grooming, bathing, dressing, toileting, functional mobility/transfers, community mobility, house maintenance, medication management, cleaning, social participation  and leisure activities  From OT standpoint, recommend STR upon D/C  OT will continue to follow to address the below stated goals  Goals   Patient Goals unable to state 2* to cognitive deficits   LTG Time Frame 10-14   Long Term Goal #1 see below goals   Plan   Treatment Interventions ADL retraining;Functional transfer training; Endurance training;UE strengthening/ROM; Cognitive reorientation;Patient/family training;Equipment evaluation/education; Neuromuscular reeducation; Compensatory technique education; Fine motor coordination activities; Activityengagement; Energy conservation   Goal Expiration Date 10/23/19   OT Treatment Day 4   OT Frequency 3-5x/wk   Additional Treatment Session   Start Time 1113   End Time 1122   Treatment Assessment Pt seen for an additional OT treatment session with emphasis on sitting balance and  tolerance with ADL's, functional transfers/mobility, cognitive -re-orientation~see assistance levels above  Pt continues with confusion, restraints and significant posterior lean at times with sitting and standing tasks and will benefit from STR post acute care admission  Pt continues to function below baseline levels occupational performance remains limited with the above noted deficits  Continue plan of care to maximize functional I with ADL's/mobility     Recommendation   OT Discharge Recommendation Short Term Rehab OT - OK to Discharge Yes  (when medically cleared)   Barthel Index   Feeding 5   Bathing 0   Grooming Score 0   Dressing Score 0   Bladder Score 0   Bowels Score 5   Toilet Use Score 0   Transfers (Bed/Chair) Score 5   Mobility (Level Surface) Score 0   Stairs Score 0   Barthel Index Score 15   Modified [place] Scale   Modified [place] Scale 4      Occupational Therapy Goals:    *S  with bed mobility to engage in functional tasks    *S Adl's after setup with use of AE PRN  *S toileting and clothing management   *Min a  functional mobility and transfers to/from all surfaces with Fair + dynamic balance and safety/  *Demonstrate good carryover with safe use of RW during functional tasks   *Assess DME needs   *Increase activity tolerance to 25-30 minutes for participation in adls and enjoyable activities  *Pt will follow 2 step directions, 50% of the time, w/o cues, to increase safety and awareness during functional activity  *Pt will demonstrate fair carryover of pt/caregiver education and training as appropriate w/o cues w/ F tolerance to increase safety during functional tasks  Jitendra Nunes MOT, OTR/L

## 2019-10-09 NOTE — PLAN OF CARE
Problem: PHYSICAL THERAPY ADULT  Goal: Performs mobility at highest level of function for planned discharge setting  See evaluation for individualized goals  Description  Treatment/Interventions: Functional transfer training, LE strengthening/ROM, Therapeutic exercise, Endurance training, Bed mobility, Gait training, Spoke to nursing, Spoke to case management, OT          See flowsheet documentation for full assessment, interventions and recommendations  Outcome: Progressing  Note:   Prognosis: Fair  Problem List: Decreased strength, Decreased endurance, Decreased range of motion, Impaired balance, Decreased coordination, Decreased mobility, Decreased cognition, Impaired judgement, Decreased safety awareness  Assessment: Pt seen for PT re-evaluation and is a 80year old male who was admitted to [facility] on 9/22 for subarachnoid hemorrhage  Pt is also presenting with the following co-morbidities: mixed hyperlipidemia, CKD stage 3, CAD, and HTN  For full social history and home set up please see initial PT evaluation  Upon re-evaluation, Pt required Max A x2 for bed mobility, transfers, and ambulation  Pt is extremely restless, impulsive, and is pleasantly confused at this time  Pt required max A for lift assistance and maintain balance upon standing  Pt required max A to ambulate 2 feet towards HOB (side-stepping) using no AD  Pt continues to present with impairments in functional mobility, gait quality, cognition, endurance, activity tolerance, standing and sitting balance/tolerance, posture, LE strength, coordination, memory, insight, safety, judgement, motor planning, and attention at this time; all leading to an increase fall risks and decrease independence  Patient would continue to benefit from skilled inpatient PT to maximize functional mobility and to decrease risk of falls  Upon discharge once medically stable; recommend REHAB to improve patient's independence and decrease burden of care   End of session, patient supine in bed with bed alarm on, wrist restraints on, and all needs in reach  Initial PT goals still appropriate at this time  Recommendation: (Rehab)     PT - OK to Discharge: Yes(to rehab once medically stable)    See flowsheet documentation for full assessment

## 2019-10-09 NOTE — PHYSICAL THERAPY NOTE
Physical Therapy Re-evaluation/Progress note    Patient Name: Dora Barrera     Time: 28 -5195  Total time: 10 minutes      Today's Date: 10/9/2019     Problem List  Principal Problem:    [facility]  Active Problems:    Essential hypertension    Coronary artery disease involving native coronary artery of native heart without angina pectoris    A-fib (HCC)    Encephalopathy    FRANCOIS (acute kidney injury) ([facility] )    CKD (chronic kidney disease) stage 3, GFR 30-59 ml/min ([facility] )    Urinary retention       Past Medical History  Past Medical History:   Diagnosis Date    Arthritis     Athscl heart disease of native coronary artery w/o ang pctrs     Coronary angioplasty status     HTN (hypertension)     Hx of echocardiogram 04/06/2012    EF 0 65, Normal LV function   Hyperlipidemia     Right bundle branch block     Ventricular premature depolarization         Past Surgical History  Past Surgical History:   Procedure Laterality Date    CARDIAC CATHETERIZATION  04/04/2012    Single vessel CAD; Successful bare metal stent placed in the distal RCA           10/09/19 1223   Note Type   Note type Re-eval  (Progress note)   Pain Assessment   Pain Assessment No/denies pain   Pain Score No Pain   Home Living   Type of [address] Two level; Able to live on main level with bedroom/bathroom   Bathroom Shower/Tub Tub/shower unit   Bathroom Toilet Standard   Bathroom Equipment Shower chair   Home Equipment Walker;Cane   Prior Function   Level of [place] Independent with ADLs and functional mobility   Lives With Spouse; Family   Receives Help From Family   ADL Assistance Independent   IADLs Needs assistance   Falls in the last 6 months >10   Vocational Retired   Comments Social history and home set up from inital PT evaluation   Restrictions/Precautions   [place] Bearing Precautions Per Order No   Other Precautions Cognitive; Chair Alarm; Bed Alarm; Restraints; Fall Risk   General   Family/Caregiver Present No   Cognition   Overall Cognitive Status Impaired   Attention Difficulty attending to directions   Orientation Level Oriented X4   Memory Decreased long term memory;Decreased short term memory;Decreased recall of biographical information;Decreased recall of recent events;Decreased recall of precautions   Following Commands Follows one step commands inconsistently   Comments Pt is pleasantly confused and requires cues to redirect to taks   RLE Assessment   RLE Assessment   (Tight R hip flexors and R knee flexors)   Strength RLE   RLE Overall Strength   (At least 3/5 unable to MMT 2/2 cognition)   LLE Assessment   LLE Assessment   (Tight L hip flexors and L knee flexors)   Strength LLE   LLE Overall Strength   (At least 3/5 unable to MMT 2/2 cognition)   Bed Mobility   Rolling R 2  Maximal assistance   Additional items Assist x 1   Rolling L 2  Maximal assistance   Additional items Assist x 1   Supine to Sit 2  Maximal assistance   Additional items Assist x 2   Sit to Supine 2  Maximal assistance   Additional items Assist x 2   Additional Comments Don wrist restrients at the end of session   Transfers   Sit to Stand 2  Maximal assistance   Additional items Assist x 2   Stand to Sit 2  Maximal assistance   Additional items Assist x 2   Additional Comments Upon standing noted; tight knee flexors; unable to straighten BLE knees   Ambulation/Elevation   Gait pattern Decreased R stance;Decreased L stance;Decreased foot clearance   Gait Assistance 2  Maximal assist   Additional items Assist x 2   Assistive Device   (HHA)   Distance 2 feet side stepping; strong posterior lean noted   Balance   Static Sitting Poor +   Dynamic Sitting Poor +   Static Standing Poor   Dynamic Standing Poor   Ambulatory Poor   Endurance Deficit   Endurance Deficit Yes   Endurance Deficit Description fatigue   Activity Tolerance   Activity Tolerance Patient tolerated treatment well   Medical Staff Made Aware OT    Nurse Made Aware RN cleared Pt for PT re-evaluation    Assessment   Prognosis Fair   Problem List Decreased strength;Decreased endurance;Decreased range of motion; Impaired balance;Decreased coordination;Decreased mobility; Decreased cognition; Impaired judgement;Decreased safety awareness   Assessment Pt seen for PT re-evaluation and is a 80year old male who was admitted to [facility] on 9/22 for subarachnoid hemorrhage  Pt is also presenting with the following co-morbidities: mixed hyperlipidemia, CKD stage 3, CAD, and HTN  For full social history and home set up please see initial PT evaluation  Upon re-evaluation, Pt required Max A x2 for bed mobility, transfers, and ambulation  Pt is extremely restless, impulsive, and is pleasantly confused at this time  Pt required max A for lift assistance and maintain balance upon standing  Pt required max A to ambulate 2 feet towards HOB (side-stepping) using no AD  Pt continues to present with impairments in functional mobility, gait quality, cognition, endurance, activity tolerance, standing and sitting balance/tolerance, posture, LE strength, coordination, memory, insight, safety, judgement, motor planning, and attention at this time; all leading to an increase fall risks and decrease independence  Patient would continue to benefit from skilled inpatient PT to maximize functional mobility and to decrease risk of falls  Upon discharge once medically stable; recommend REHAB to improve patient's independence and decrease burden of care  End of session, patient supine in bed with bed alarm on, wrist restraints on, and all needs in reach  Initial PT goals still appropriate at this time  Goals   Patient Goals to get out of bed   STG Expiration Date 10/19/19   Short Term Goal #1 In 10 sessions pt will: 1  Transfer supine <> sit with supervision  2  Transfer sit <> stand with Bob and LRAD  3  Ambulate >40 ft with Bob and LRAD   4  Increase muscle strength >1 grade  5  Increase activity tolerance >45 minutes   Plan   Treatment/Interventions Functional transfer training;LE strengthening/ROM; Elevations; Therapeutic exercise; Endurance training;Patient/family training;Bed mobility;Gait training   PT Frequency Other (Comment)  (3-5x/wk)   Recommendation   Recommendation   (Rehab)   Equipment Recommended Walker   PT - OK to Discharge Yes  (to rehab once medically stable)   Barthel Index   Feeding 5   Bathing 0   Grooming Score 0   Dressing Score 0   Bladder Score 0   Bowels Score 5   Toilet Use Score 0   Transfers (Bed/Chair) Score 5   Mobility (Level Surface) Score 0   Stairs Score 0   Barthel Index Score 15     PT Treatment Note    Time in: 12:11  Time out: 12:23  Total time: 12 minutes    S: Pt is extremely restless at time  Pt agreeable to PT session  O: Static/dyanamic sitting balance ~8 minutes for grooming; 2 STS using RW, max A x2, static standing for ~10 seconds with max A x 2 to maintain balance  A: Pt required intermittent min A to maintain static sitting balance  In sitting posture, Pt has difficulty with keeping his feet on the ground secondary to tight hip flexors  Upon standing, Pt had a strong posterior lean and further ambulation was deferred at this time  P:PT to continue to follow   Plan to progress ambulation, elevations, functional transfers, there-ex, and balance exercises     Maki Orozco, NORMAT, PT

## 2019-10-09 NOTE — PLAN OF CARE
Problem: Prexisting or High Potential for Compromised Skin Integrity  Goal: Skin integrity is maintained or improved  Description  INTERVENTIONS:  - Identify patients at risk for skin breakdown  - Assess and monitor skin integrity  - Assess and monitor nutrition and hydration status  - Monitor labs   - Assess for incontinence   - Turn and reposition patient  - Assist with mobility/ambulation  - Relieve pressure over bony prominences  - Avoid friction and shearing  - Provide appropriate hygiene as needed including keeping skin clean and dry  - Evaluate need for skin moisturizer/barrier cream  - Collaborate with interdisciplinary team   - Patient/family teaching  - Consider wound care consult   Outcome: Progressing     Problem: Neurological Deficit  Goal: Neurological status is stable or improving  Description  Interventions:  - Monitor and assess patient's level of consciousness, motor function, sensory function, and level of assistance needed for ADLs  - Monitor and report changes from baseline  Collaborate with interdisciplinary team to initiate plan and implement interventions as ordered  - Provide and maintain a safe environment  - Consider seizure precautions  - Consider fall precautions  - Consider aspiration precautions  - Consider bleeding precautions  Outcome: Progressing     Problem: Activity Intolerance/Impaired Mobility  Goal: Mobility/activity is maintained at optimum level for patient  Description  Interventions:  - Assess and monitor patient  barriers to mobility and need for assistive/adaptive devices  - Assess patient's emotional response to limitations  - Collaborate with interdisciplinary team and initiate plans and interventions as ordered  - Encourage independent activity per ability   - Maintain proper body alignment  - Perform active/passive rom as tolerated/ordered    - Plan activities to conserve energy   - Turn patient as appropriate  Outcome: Progressing     Problem: Potential for Aspiration  Goal: Non-ventilated patient's risk of aspiration is minimized  Description  Assess and monitor vital signs, respiratory status, and labs (WBC)  Monitor for signs of aspiration (tachypnea, cough, rales, wheezing, cyanosis, fever)  - Assess and monitor patient's ability to swallow  - Place patient up in chair to eat if possible  - HOB up at 90 degrees to eat if unable to get patient up into chair   - Supervise patient during oral intake  - Instruct patient/ family to take small bites  - Instruct patient/ family to take small single sips when taking liquids  - Follow patient-specific strategies generated by speech pathologist   Outcome: Progressing     Problem: Nutrition  Goal: Nutrition/Hydration status is improving  Description  Monitor and assess patient's nutrition/hydration status for malnutrition (ex- brittle hair, bruises, dry skin, pale skin and conjunctiva, muscle wasting, smooth red tongue, and disorientation)  Collaborate with interdisciplinary team and initiate plan and interventions as ordered  Monitor patient's weight and dietary intake as ordered or per policy  Utilize nutrition screening tool and intervene per policy  Determine patient's food preferences and provide high-protein, high-caloric foods as appropriate  - Assist patient with eating   - Allow adequate time for meals   - Encourage patient to take dietary supplement as ordered  - Collaborate with clinical nutritionist   - Include patient/family/caregiver in decisions related to nutrition    Outcome: Progressing     Problem: NEUROSENSORY - ADULT  Goal: Achieves stable or improved neurological status  Description  INTERVENTIONS  - Monitor and report changes in neurological status  - Monitor vital signs such as temperature, blood pressure, glucose, and any other labs ordered   - Initiate measures to prevent increased intracranial pressure  - Monitor for seizure activity and implement precautions if appropriate Outcome: Progressing  Goal: Achieves maximal functionality and self care  Description  INTERVENTIONS  - Monitor swallowing and airway patency with patient fatigue and changes in neurological status  - Encourage and assist patient to increase activity and self care     - Encourage visually impaired, hearing impaired and aphasic patients to use assistive/communication devices  Outcome: Progressing     Problem: GENITOURINARY - ADULT  Goal: Urinary catheter remains patent  Description  INTERVENTIONS:  - Assess patency of urinary catheter  - If patient has a chronic barrera, consider changing catheter if non-functioning  - Follow guidelines for intermittent irrigation of non-functioning urinary catheter  Outcome: Progressing     Problem: METABOLIC, FLUID AND ELECTROLYTES - ADULT  Goal: Electrolytes maintained within normal limits  Description  INTERVENTIONS:  - Monitor labs and assess patient for signs and symptoms of electrolyte imbalances  - Administer electrolyte replacement as ordered  - Monitor response to electrolyte replacements, including repeat lab results as appropriate  - Instruct patient on fluid and nutrition as appropriate  Outcome: Progressing  Goal: Fluid balance maintained  Description  INTERVENTIONS:  - Monitor labs   - Monitor I/O and WT  - Instruct patient on fluid and nutrition as appropriate  - Assess for signs & symptoms of volume excess or deficit  Outcome: Progressing     Problem: SKIN/TISSUE INTEGRITY - ADULT  Goal: Skin integrity remains intact  Description  INTERVENTIONS  - Identify patients at risk for skin breakdown  - Assess and monitor skin integrity  - Assess and monitor nutrition and hydration status  - Monitor labs (i e  albumin)  - Assess for incontinence   - Turn and reposition patient  - Assist with mobility/ambulation  - Relieve pressure over bony prominences  - Avoid friction and shearing  - Provide appropriate hygiene as needed including keeping skin clean and dry  - Evaluate need for skin moisturizer/barrier cream  - Collaborate with interdisciplinary team (i e  Nutrition, Rehabilitation, etc )   - Patient/family teaching  Outcome: Progressing     Problem: MUSCULOSKELETAL - ADULT  Goal: Maintain or return mobility to safest level of function  Description  INTERVENTIONS:  - Assess patient's ability to carry out ADLs; assess patient's baseline for ADL function and identify physical deficits which impact ability to perform ADLs (bathing, care of mouth/teeth, toileting, grooming, dressing, etc )  - Assess/evaluate cause of self-care deficits   - Assess range of motion  - Assess patient's mobility  - Assess patient's need for assistive devices and provide as appropriate  - Encourage maximum independence but intervene and supervise when necessary  - Involve family in performance of ADLs  - Assess for home care needs following discharge   - Consider OT consult to assist with ADL evaluation and planning for discharge  - Provide patient education as appropriate  Outcome: Progressing     Problem: PAIN - ADULT  Goal: Verbalizes/displays adequate comfort level or baseline comfort level  Description  Interventions:  - Encourage patient to monitor pain and request assistance  - Assess pain using appropriate pain scale  - Administer analgesics based on type and severity of pain and evaluate response  - Implement non-pharmacological measures as appropriate and evaluate response  - Consider cultural and social influences on pain and pain management  - Notify physician/advanced practitioner if interventions unsuccessful or patient reports new pain  Outcome: Progressing     Problem: INFECTION - ADULT  Goal: Absence or prevention of progression during hospitalization  Description  INTERVENTIONS:  - Assess and monitor for signs and symptoms of infection  - Monitor lab/diagnostic results  - Monitor all insertion sites, i e  indwelling lines, tubes, and drains  - [place] appropriate cooling/warming therapies per order  - Administer medications as ordered  - Instruct and encourage patient and family to use good hand hygiene technique  - Identify and instruct in appropriate isolation precautions for identified infection/condition   Outcome: Progressing     Problem: SAFETY ADULT  Goal: Patient will remain free of falls  Description  INTERVENTIONS:  - Assess patient frequently for physical needs  -  Identify cognitive and physical deficits and behaviors that affect risk of falls    -  [place] fall precautions as indicated by assessment   - Educate patient/family on patient safety including physical limitations  - Instruct patient to call for assistance with activity based on assessment  - Modify environment to reduce risk of injury  - Consider OT/PT consult to assist with strengthening/mobility  Outcome: Progressing  Goal: Maintain or return to baseline ADL function  Description  INTERVENTIONS:  -  Assess patient's ability to carry out ADLs; assess patient's baseline for ADL function and identify physical deficits which impact ability to perform ADLs (bathing, care of mouth/teeth, toileting, grooming, dressing, etc )  - Assess/evaluate cause of self-care deficits   - Assess range of motion  - Assess patient's mobility; develop plan if impaired  - Assess patient's need for assistive devices and provide as appropriate  - Encourage maximum independence but intervene and supervise when necessary  - Involve family in performance of ADLs  - Assess for home care needs following discharge   - Consider OT consult to assist with ADL evaluation and planning for discharge  - Provide patient education as appropriate  Outcome: Progressing  Goal: Maintain or return mobility status to optimal level  Description  INTERVENTIONS:  - Assess patient's baseline mobility status (ambulation, transfers, stairs, etc )    - Identify cognitive and physical deficits and behaviors that affect mobility  - Identify mobility aids required to assist with transfers and/or ambulation (gait belt, sit-to-stand, lift, walker, cane, etc )  - [place] fall precautions as indicated by assessment  - Record patient progress and toleration of activity level on Mobility SBAR; progress patient to next Phase/Stage  - Instruct patient to call for assistance with activity based on assessment  - Consider rehabilitation consult to assist with strengthening/weightbearing, etc   Outcome: Progressing     Problem: DISCHARGE PLANNING  Goal: Discharge to home or other facility with appropriate resources  Description  INTERVENTIONS:  - Identify barriers to discharge w/patient and caregiver  - Arrange for needed discharge resources and transportation as appropriate  - Identify discharge learning needs (meds, wound care, etc )  - Arrange for interpretive services to assist at discharge as needed  - Refer to Case Management Department for coordinating discharge planning if the patient needs post-hospital services based on physician/advanced practitioner order or complex needs related to functional status, cognitive ability, or social support system  Outcome: Progressing     Problem: Knowledge Deficit  Goal: Patient/family/caregiver demonstrates understanding of disease process, treatment plan, medications, and discharge instructions  Description  Complete learning assessment and assess knowledge base  Interventions:  - Provide teaching at level of understanding  - Provide teaching via preferred learning methods  Outcome: Progressing     Problem: Potential for Falls  Goal: Patient will remain free of falls  Description  INTERVENTIONS:  - Assess patient frequently for physical needs  -  Identify cognitive and physical deficits and behaviors that affect risk of falls    -  [place] fall precautions as indicated by assessment   - Educate patient/family on patient safety including physical limitations  - Instruct patient to call for assistance with activity based on assessment  - Modify environment to reduce risk of injury  - Consider OT/PT consult to assist with strengthening/mobility  Outcome: Progressing     Problem: Nutrition/Hydration-ADULT  Goal: Nutrient/Hydration intake appropriate for improving, restoring or maintaining nutritional needs  Description  Monitor and assess patient's nutrition/hydration status for malnutrition  Collaborate with interdisciplinary team and initiate plan and interventions as ordered  Monitor patient's weight and dietary intake as ordered or per policy  Utilize nutrition screening tool and intervene as necessary  Determine patient's food preferences and provide high-protein, high-caloric foods as appropriate       INTERVENTIONS:  - Monitor oral intake, urinary output, labs, and treatment plans  - Assess nutrition and hydration status and recommend course of action  - Evaluate amount of meals eaten  - Assist patient with eating if necessary   - Allow adequate time for meals  - Recommend/ encourage appropriate diets, oral nutritional supplements, and vitamin/mineral supplements  - Order, calculate, and assess calorie counts as needed  - Recommend, monitor, and adjust tube feedings and TPN/PPN based on assessed needs  - Assess need for intravenous fluids  - Provide specific nutrition/hydration education as appropriate  - Include patient/family/caregiver in decisions related to nutrition  Outcome: Progressing     Problem: CONFUSION/THOUGHT DISTURBANCE  Goal: Thought disturbances (confusion, delirium, depression, dementia or psychosis) are managed to maintain or return to baseline mental status and functional level  Description  INTERVENTIONS:  - Assess for possible contributors to  thought disturbance, including but not limited to medications, infection, impaired vision or hearing, underlying metabolic abnormalities, dehydration, respiratory compromise,  psychiatric diagnoses and notify attending PHYSICAN/AP  - Monitor and intervene to maintain adequate nutrition, hydration, elimination, sleep and activity  - Decrease environmental stimuli, including noise as appropriate  - Provide frequent contacts to provide refocusing, direction and reassurance as needed  Approach patient calmly with eye contact and at their level  - [place] high risk fall precautions, aspiration precautions and other safety measures, as indicated  - If delirium suspected, notify physician/AP of change in condition and request immediate in-person evaluation  - Pursue consults as appropriate including Geriatric (campus dependent), OT for cognitive evaluation/activity planning, psychiatric, pastoral care, etc   Outcome: Progressing     Problem: BEHAVIOR  Goal: Pt/Family maintain appropriate behavior and adhere to behavioral management agreement, if implemented  Description  INTERVENTIONS:  - Assess the family dynamic   - Encourage verbalization of thoughts and concerns in a socially appropriate manner  - Assess patient/family's coping skills and non-compliant behavior (including use of illegal substances)  - Utilize positive, consistent limit setting strategies supporting safety of patient, staff and others  - Initiate consult with Case Management, Spiritual Care or other ancillary services as appropriate  - If a patient's/visitor's behavior jeopardizes the safety of the patient, staff, or others, refer to organization procedure     - Notify Security of behavior or suspected illegal substances which indicate the need for search of the patient and/or belongings  - Encourage participation in the decision making process about a behavioral management agreement; implement if patient meets criteria  Outcome: Progressing

## 2019-10-09 NOTE — PROGRESS NOTES
Progress Note - Shola Zhao 80 y o  male MRN: [id number]    [facility] Encounter: [id number]      AssessmentPlan:  1  Encephalopathy  Waxing and waning  Alert and oriented x1, awake  Patient with periods of agitation last evening was on wrist and Posey restraints  Alert and has improved with hydration, seroquel discontinued  Avoid deliriogenic medications such as tramadol, benzodiazepines, anticholinergics,  Benadryl  Treat pain, See geriatric pain protocol  Monitor for constipation last BM10/8/19  Monitor urinary retention- barrera in place  Encourage early and frequent moblization, OOB  Encourage Hydration/ Nutrition  Implement sleep hygiene, limit night time interuptions, group activities    2  Frailty  Continue fall precautions  Albumin 3 0  Nutrition support  PT/OT  Rehab post hospitalization  Encourage engagement in activity    3  Hearing impairment  Recommend amplifier  Speak loudly and clearly    4  Ambulatory dysfunction  Fall precautions  PT/OT    5  Subarachnoid hemorrhage  Neurology following  Patient on Depakote for seizure prophylaxis  LFT WNL except albumin 3 0  Ammonia level less than 10    6  FRANCOIS  Cr 2 02  Continue IVF  Avoid nephrotoxic medications  Management by Internal Medicine    7  Urinary retention  Failed urinary trials  Barrera in place        Subjective:   Upon exam patient is in bed  Bilateral wrist restraints on  He is alert and oriented to name only  Per nursing staff patient 8 100% of breakfast   He was trialed off of restraints but continues to pull out Barrera catheter  Plan is to have patient out of bed and ambulating in a short while  Objective:     Vitals: Blood pressure 126/94, pulse 86, temperature (!) 96 9 °F (36 1 °C), resp  rate 16, height 5' 9" (1 753 m), weight 65 kg (143 lb 4 8 oz), SpO2 98 %  ,Body mass index is 21 16 kg/m²        Intake/Output Summary (Last 24 hours) at 10/9/2019 1018  Last data filed at 10/9/2019 0530  Gross per 24 hour   Intake 0 ml   Output 850 ml   Net -850 ml       Physical Exam:   General : NAD  HEENT : MMM   Heart : Normal rate, no murmur rub or gallop  Lungs : CTA no wheezes, rales or rhonchi  Abdomen : Soft, NT/ND, BS auscultated in all 4 quads  Ext :  no edema  Skin : Pink, warm, dry, age appropriate turgor and mobility  Neuro : Nonfocal  Psych : Alert and O x 1, name only      Invasive Devices     Peripheral Intravenous Line            Peripheral IV 10/07/19 Right;Upper;Ventral (anterior) Arm 2 days          Drain            Urethral Catheter Straight-tip 16 Fr  14 days                Lab, Imaging and other studies: I have personally reviewed pertinent reports      VTE Pharmacologic Prophylaxis: Sequential compression device (Venodyne)   VTE Mechanical Prophylaxis: sequential compression device

## 2019-10-09 NOTE — PROGRESS NOTES
Pt noted to be increasingly lethargic and difficult to arouse  Provider made aware  Gerontology consulted  CBC and CMP labs ordered  NSS @ 75 ml/started  Daily seroquel dose disontinued  Will continue to monitor LOC and vitals

## 2019-10-09 NOTE — PLAN OF CARE
Problem: OCCUPATIONAL THERAPY ADULT  Goal: Performs self-care activities at highest level of function for planned discharge setting  See evaluation for individualized goals  Description  Treatment Interventions: ADL retraining, Functional transfer training, UE strengthening/ROM, Endurance training, Cognitive reorientation, Patient/family training, Equipment evaluation/education, Compensatory technique education, Continued evaluation, Energy conservation, Activityengagement          See flowsheet documentation for full assessment, interventions and recommendations  Outcome: Progressing  Note:   Limitation: Decreased ADL status, Decreased Safe judgement during ADL, Decreased cognition, Decreased endurance, Decreased self-care trans, Decreased high-level ADLs, Decreased fine motor control  Prognosis: Fair  Assessment: Pt seen in this date for a re-evaluation and is a 80 y o  male who was admitted to [facility] on 9/22/2019 with SAH (subarachnoid hemorrhage) ([facility] [address] )   Pt's problem list also includes PMH of mixed hyperlipidemia, CKD stage 3, CAD, HTN  See initial evaluation for full social history/home set-up/prior baseline function  Currently pt requires mod a UB, max a LB for overall ADLS and max a x 2 transfers/min/mod a x 2 functional mobility  Pt currently presents with impairments in the following categories -difficulty performing ADLS, difficulty performing IADLS , limited insight into deficits, compliance and decreased initiation and engagement  activity tolerance, endurance, standing balance/tolerance, sitting balance/tolerance, memory, insight, safety , judgement , attention , sequencing , communication and interpersonal skills   These impairments, as well as pt's fatigue and risk for falls  limit pt's ability to safely engage in all baseline areas of occupation, includingeating, grooming, bathing, dressing, toileting, functional mobility/transfers, community mobility, house maintenance, medication management, cleaning, social participation  and leisure activities  From OT standpoint, recommend STR upon D/C  OT will continue to follow to address the below stated goals        OT Discharge Recommendation: Short Term Rehab  OT - OK to Discharge: Yes(when medically cleared)

## 2019-10-10 LAB — GLUCOSE SERPL-MCNC: 103 MG/DL (ref 65–140)

## 2019-10-10 PROCEDURE — 99233 SBSQ HOSP IP/OBS HIGH 50: CPT | Performed by: INTERNAL MEDICINE

## 2019-10-10 PROCEDURE — 99232 SBSQ HOSP IP/OBS MODERATE 35: CPT | Performed by: NURSE PRACTITIONER

## 2019-10-10 PROCEDURE — 82948 REAGENT STRIP/BLOOD GLUCOSE: CPT

## 2019-10-10 RX ORDER — QUETIAPINE FUMARATE 25 MG/1
6.25 TABLET, FILM COATED ORAL
Status: DISCONTINUED | OUTPATIENT
Start: 2019-10-10 | End: 2019-10-11

## 2019-10-10 RX ADMIN — DIVALPROEX SODIUM 500 MG: 125 CAPSULE, COATED PELLETS ORAL at 17:26

## 2019-10-10 RX ADMIN — AMLODIPINE BESYLATE 5 MG: 5 TABLET ORAL at 08:33

## 2019-10-10 RX ADMIN — QUETIAPINE FUMARATE 6.25 MG: 25 TABLET ORAL at 22:03

## 2019-10-10 RX ADMIN — NYSTATIN: 100000 POWDER TOPICAL at 17:26

## 2019-10-10 RX ADMIN — ACETAMINOPHEN 975 MG: 325 TABLET ORAL at 22:03

## 2019-10-10 RX ADMIN — ACETAMINOPHEN 975 MG: 325 TABLET ORAL at 05:13

## 2019-10-10 RX ADMIN — DOCUSATE SODIUM 100 MG: 100 CAPSULE, LIQUID FILLED ORAL at 17:26

## 2019-10-10 RX ADMIN — ACETAMINOPHEN 975 MG: 325 TABLET ORAL at 15:26

## 2019-10-10 RX ADMIN — NYSTATIN: 100000 POWDER TOPICAL at 09:00

## 2019-10-10 RX ADMIN — DOCUSATE SODIUM 100 MG: 100 CAPSULE, LIQUID FILLED ORAL at 08:33

## 2019-10-10 NOTE — ASSESSMENT & PLAN NOTE
Patient more alert since d/c of seroquel  IV hydration  Maintain circadian rhythm  Starting low dose seroquel tonight

## 2019-10-10 NOTE — PROGRESS NOTES
Progress Note Kyle Tom 1/30/1924, 80 y o  male MRN: [id number]    [facility]: [id number]    Primary Care Provider: Shadi Witt DO   Date and time admitted to hospital: 9/22/2019  6:37 PM        * SAH (subarachnoid hemorrhage) ([facility] [address] )  Assessment & Plan  NO AP or AC  MRI and CT head shows stable hemorrhage  Continue PT/OT  Fall precaution  Not likely cause of delirium    Encephalopathy  Assessment & Plan  Patient more alert since d/c of seroquel  IV hydration  Maintain circadian rhythm      FRANCOIS (acute kidney injury) ([facility] )  Assessment & Plan  Developed FRANCOIS rapidly if not eating or drinking due to sedation  Continue IVF until eating and drinking consistently    A-fib [facility])  Assessment & Plan  Currently sinus  No rate control medicines needed  No AC due to bleed    Urinary retention  Assessment & Plan  The patient has had Barrera catheter placed twice as he has failed voiding trials  Continue barrera catheter for now until more mobile and alert    CKD (chronic kidney disease) stage 3, GFR 30-59 ml/min ([facility])  Assessment & Plan  Unknown baseline renal function, continue to monitor BMP    Coronary artery disease involving native coronary artery of native heart without angina pectoris  Assessment & Plan  S/p remote bare metal stent  Pt on no meds at home    Essential hypertension  Assessment & Plan  Continue Norvasc  Goal SBP < 140  Labetalol prn      VTE Pharmacologic Prophylaxis:   Pharmacologic: Pharmacologic VTE Prophylaxis contraindicated due to [facility]  Mechanical VTE Prophylaxis in Place: Yes    Patient Centered Rounds: I have performed bedside rounds with nursing staff today  Discussions with Specialists or Other Care Team Provider: none    Education and Discussions with Family / Patient: patient    Time Spent for Care: 30 minutes  More than 50% of total time spent on counseling and coordination of care as described above      Current Length of Stay: 17 day(s)    Current Patient Status: Inpatient   Certification Statement: The patient will continue to require additional inpatient hospital stay due to further medical stabilization of FRANCOIS    Discharge Plan: likely d/c to rehab in 3-4 days    Code Status: Level 3 - DNAR and DNI      Subjective:   No acute overnight events  This morning patient is more alert  He denies CP, SOB, N/V, fevers, chills  Objective:     Vitals:   Temp (24hrs), Av 9 °F (36 1 °C), Min:96 9 °F (36 1 °C), Max:96 9 °F (36 1 °C)    Temp:  [96 9 °F (36 1 °C)] 96 9 °F (36 1 °C)  BP: (126)/(94) 126/94  Body mass index is 21 16 kg/m²  Input and Output Summary (last 24 hours): Intake/Output Summary (Last 24 hours) at 10/9/2019 2229  Last data filed at 10/9/2019 195  Gross per 24 hour   Intake 1613 75 ml   Output 1700 ml   Net -86 25 ml       Physical Exam:     Physical Exam   Constitutional: He appears well-developed and well-nourished  HENT:   Head: Normocephalic and atraumatic  Mouth/Throat: Oropharynx is clear and moist    Eyes: Pupils are equal, round, and reactive to light  Conjunctivae are normal    Neck: Neck supple  No JVD present  Cardiovascular: Normal rate, regular rhythm, normal heart sounds and intact distal pulses  Pulmonary/Chest: Effort normal and breath sounds normal    Abdominal: Soft  Bowel sounds are normal    Musculoskeletal: He exhibits no edema  Neurological: He is alert  Unable to follow commands   Skin: Skin is warm and dry  Capillary refill takes less than 2 seconds  Psychiatric:   Patient is confused   Nursing note and vitals reviewed          Additional Data:     Labs:    Results from last 7 days   Lab Units 10/08/19  1619 10/07/19  0536   WBC Thousand/uL 10 72* 7 37   HEMOGLOBIN g/dL 13 4 14 5   HEMATOCRIT % 39 9 41 8   PLATELETS Thousands/uL 209 209   NEUTROS PCT %  --  74   LYMPHS PCT %  --  17   MONOS PCT %  --  6   EOS PCT %  --  2     Results from last 7 days   Lab Units 10/08/19  1619   SODIUM mmol/L 140   POTASSIUM mmol/L 4 4   CHLORIDE mmol/L 109*   CO2 mmol/L 24   BUN mg/dL 45*   CREATININE mg/dL 2 02*   ANION GAP mmol/L 7   CALCIUM mg/dL 9 1   ALBUMIN g/dL 3 0*   TOTAL BILIRUBIN mg/dL 0 62   ALK PHOS U/L 90   ALT U/L 22   AST U/L 15   GLUCOSE RANDOM mg/dL 83                           * I Have Reviewed All Lab Data Listed Above  * Additional Pertinent Lab Tests Reviewed: Trevor 66 Admission Reviewed    Imaging:    Imaging Reports Reviewed Today Include: none  Imaging Personally Reviewed by Myself Includes:  none    Recent Cultures (last 7 days):           Last 24 Hours Medication List:     Current Facility-Administered Medications:  acetaminophen 975 mg Oral [facility] [address] Tuesday M MARC Moreno   amLODIPine 5 mg Oral Daily Sonia Christopher DO   divalproex sodium 500 mg Oral Daily Terri Tay MD   docusate sodium 100 mg Oral BID Terri Tay MD   Labetalol HCl 5 mg Intravenous Q4H PRN Brigitte Hendricks MD   nystatin  Topical BID Kalina Arellano DO        Today, Patient Was Seen By: Brigitte Hendricks MD    ** Please Note: Dictation voice to text software may have been used in the creation of this document   **

## 2019-10-10 NOTE — UTILIZATION REVIEW
Continued Stay Review    Date: 10/10          [facility] HOSPITAL OUTLIER REVIEW                Current Patient Class: INPATIENT  Current Level of Care: med/surg    HPI:89 y o  male initially admitted as inpatient on 9/22    Assessment/Plan:   10/10 1  Encephalopathy  Alert and oriented to name only, follows commands  Patient with periods of restlessness, disorientation  Continues with restraint  Alert is has improved with hydration and discontinuation of Seroquel  Hoffmann in place for urinary retention  Last bowel movement this a m  10/10/2019  Encourage p o  Hydration/nutrition  2  Frailty  Continue fall precaution  Nutrition support  PT/OT  Encourage out of bed and mobilization  Encourage engagement in  Activity  3  Hearing impairment  Recommend amplifier  Speak loudly and clearly  4  Ambulatory dysfunction  Fall precautions  PT/OT  5  [facility]  Neurology following  Pt on depakote for seizure ppx  6  FRANCOIS  Cr 2 02 (10/8/19)  IVF off  Encourage po intake  7   Urinary retention  Failed urinary voiding trials  Hoffmann in place    Pertinent Labs/Diagnostic Results:   Results from last 7 days   Lab Units 10/08/19  1619 10/07/19  0536 10/04/19  0603   WBC Thousand/uL 10 72* 7 37 8 44   HEMOGLOBIN g/dL 13 4 14 5 13 0   HEMATOCRIT % 39 9 41 8 37 5   PLATELETS Thousands/uL 209 209 185   NEUTROS ABS Thousands/µL  --  5 42  --          Results from last 7 days   Lab Units 10/08/19  1619 10/07/19  0536 10/05/19  0735 10/04/19  0603   SODIUM mmol/L 140 138 140 139   POTASSIUM mmol/L 4 4 3 7 4 5 4 0   CHLORIDE mmol/L 109* 107 110* 109*   CO2 mmol/L 24 25 24 23   ANION GAP mmol/L 7 6 6 7   BUN mg/dL 45* 32* 51* 41*   CREATININE mg/dL 2 02* 1 52* 1 92* 1 64*   EGFR ml/min/1 73sq m 27 38 29 35   CALCIUM mg/dL 9 1 9 1 9 0 8 5     Results from last 7 days   Lab Units 10/08/19  1619   AST U/L 15   ALT U/L 22   ALK PHOS U/L 90   TOTAL PROTEIN g/dL 6 6   ALBUMIN g/dL 3 0*   TOTAL BILIRUBIN mg/dL 0 62     Results from last 7 days   Lab Units 10/10/19  0706   POC GLUCOSE mg/dl 103     Results from last 7 days   Lab Units 10/08/19  1619 10/07/19  0536 10/05/19  0735 10/04/19  0603   GLUCOSE RANDOM mg/dL 83 94 78 103          Vital Signs:   10/10/19 07:51:08  97 1 °F (36 2 °C)Abnormal   62    150/80  103  97 %       10/10/19 07:50:41  97 1 °F (36 2 °C)Abnormal   59    150/80  103  97 %       10/10/19 07:47:37  93 4 °F (34 1 °C)Abnormal   50Abnormal     150/80  103  98 %             Medications:     Medications:  acetaminophen 975 mg Oral Q8H [facility]   amLODIPine 5 mg Oral Daily   divalproex sodium 500 mg Oral Daily   docusate sodium 100 mg Oral BID   nystatin  Topical BID   QUEtiapine 6 25 mg Oral HS          Labetalol HCl 5 mg Intravenous Q4H PRN       Discharge Plan: TBD    Network Utilization Review Department  Phone: [phone]; Fax [phone]  [email or web address]  ATTENTION: Please call with any questions or concerns to [phone]  and carefully listen to the prompts so that you are directed to the right person  Send all requests for admission clinical reviews, approved or denied determinations and any other requests to fax [phone]   All voicemails are confidential

## 2019-10-10 NOTE — RESTORATIVE TECHNICIAN NOTE
Restorative Specialist Mobility Note                      Repositioned: Other (Comment)(Rep /sat pt upright in bed  Bed alarm on  B/L mitts and wrist restraints on   Pt callbell, phone/tray within reach )       Lizzeth CHOWDARY, Restorative Technician, [employer]

## 2019-10-10 NOTE — PROGRESS NOTES
Progress Note - Dionne Alexander 80 y o  male MRN: [id number]    [facility] Encounter: [id number]      Assessment/Plan:  1  Encephalopathy  Alert and oriented to name only, follows commands  Patient with periods of restlessness, disorientation  Continues with restraint  Alert is has improved with hydration and discontinuation of Seroquel  Hoffmann in place for urinary retention  Last bowel movement this a m  10/10/2019  Encourage p o  Hydration/nutrition      2  Frailty  Continue fall precaution  Nutrition support  PT/OT  Encourage out of bed and mobilization  Encourage engagement in  Activity    3  Hearing impairment  Recommend amplifier  Speak loudly and clearly    4  Ambulatory dysfunction  Fall precautions  PT/OT    5  [facility]  Neurology following  Pt on depakote for seizure ppx    6  FRANCOIS  Cr 2 02 (10/8/19)  IVF off  Encourage po intake    7  Urinary retention  Failed urinary voiding trials  Hoffmann in place          Subjective:   Upon exam pt in bed, alert and oriented to name only  Pt in bilateral wrist restraints, confused  Objective:     Vitals: Blood pressure 150/80, pulse 62, temperature (!) 97 1 °F (36 2 °C), resp  rate 18, height 5' 9" (1 753 m), weight 65 1 kg (143 lb 8 3 oz), SpO2 97 %  ,Body mass index is 21 19 kg/m²  Intake/Output Summary (Last 24 hours) at 10/10/2019 1424  Last data filed at 10/10/2019 1254  Gross per 24 hour   Intake 40 ml   Output 2500 ml   Net -2460 ml       Physical Exam:   General : NAD  HEENT : MMM   Heart : Normal rate, no murmur rub or gallop  Lungs : CTA no wheezes, rales or rhonchi  Abdomen : Soft, NT/ND, BS auscultated in all 4 quads     Ext :  no edema  Skin : Pink, warm, dry, age appropriate turgor and mobility  Neuro : Nonfocal  Psych : Alert and O x 1      Invasive Devices     Peripheral Intravenous Line            Peripheral IV 10/07/19 Right;Upper;Ventral (anterior) Arm 3 days          Drain            Urethral Catheter Straight-tip 16 Fr  15 days Lab, Imaging and other studies: I have personally reviewed pertinent reports      VTE Pharmacologic Prophylaxis: Sequential compression device (Venodyne)   VTE Mechanical Prophylaxis: sequential compression device

## 2019-10-10 NOTE — PROGRESS NOTES
Progress Note Chacho [place] 1/30/1924, 80 y o  male MRN: [id number]    [facility] Encounter: [id number]    Primary Care Provider: Brayden Diallo DO   Date and time admitted to hospital: 9/22/2019  6:37 PM        * SAH (subarachnoid hemorrhage) ([facility] )  Assessment & Plan  NO AP or AC  MRI and CT head shows stable hemorrhage  Continue PT/OT  Fall precaution  Not likely cause of delirium    Encephalopathy  Assessment & Plan  Patient more alert since d/c of seroquel  IV hydration  Maintain circadian rhythm  Starting low dose seroquel tonight      FRANCOIS (acute kidney injury) ([facility] )  Assessment & Plan  Developed FRANCOIS rapidly if not eating or drinking due to sedation  Continue IVF until eating and drinking consistently    A-fib [facility])  Assessment & Plan  Currently sinus  No rate control medicines needed  No AC due to bleed    Urinary retention  Assessment & Plan  The patient has had Barrera catheter placed twice as he has failed voiding trials  Continue barrera catheter for now until more mobile and alert    CKD (chronic kidney disease) stage 3, GFR 30-59 ml/min ([facility])  Assessment & Plan  Unknown baseline renal function, continue to monitor BMP    Coronary artery disease involving native coronary artery of native heart without angina pectoris  Assessment & Plan  S/p remote bare metal stent  Pt on no meds at home    Essential hypertension  Assessment & Plan  Continue Norvasc  Goal SBP < 140  Labetalol prn      VTE Pharmacologic Prophylaxis:   Pharmacologic: Pharmacologic VTE Prophylaxis contraindicated due to [facility]  Mechanical VTE Prophylaxis in Place: Yes    Patient Centered Rounds: I have performed bedside rounds with nursing staff today  Discussions with Specialists or Other Care Team Provider: geriatrics    Education and Discussions with Family / Patient: patient    Time Spent for Care: 30 minutes  More than 50% of total time spent on counseling and coordination of care as described above      Current Length of Stay: 18 day(s)    Current Patient Status: Inpatient   Certification Statement: The patient will continue to require additional inpatient hospital stay due to further stabilization of psych meds    Discharge Plan: likely d/c to rehab in 2-3 days    Code Status: Level 3 - DNAR and DNI      Subjective:   No acute events  Patient states he feels well  He continues to have confusion  He denies any chest pain, shortness of breath, nausea, fever, vomiting  Objective:     Vitals:   Temp (24hrs), Av 7 °F (35 9 °C), Min:93 4 °F (34 1 °C), Max:98 7 °F (37 1 °C)    Temp:  [93 4 °F (34 1 °C)-98 7 °F (37 1 °C)] 98 7 °F (37 1 °C)  HR:  [50-64] 64  Resp:  [18] 18  BP: (147-152)/(80-81) 147/80  SpO2:  [96 %-98 %] 96 %  Body mass index is 21 19 kg/m²  Input and Output Summary (last 24 hours): Intake/Output Summary (Last 24 hours) at 10/10/2019 1823  Last data filed at 10/10/2019 1254  Gross per 24 hour   Intake 40 ml   Output 2500 ml   Net -2460 ml       Physical Exam:     Physical Exam   Constitutional: He appears well-developed and well-nourished  HENT:   Head: Normocephalic and atraumatic  Mouth/Throat: Oropharynx is clear and moist    Eyes: Pupils are equal, round, and reactive to light  Conjunctivae are normal    Neck: Neck supple  No JVD present  Cardiovascular: Normal rate, regular rhythm, normal heart sounds and intact distal pulses  Pulmonary/Chest: Effort normal and breath sounds normal    Abdominal: Soft  Bowel sounds are normal    Musculoskeletal: He exhibits no edema  Neurological: He is alert  Oriented to person  Uncooperative with exam   Skin: Skin is warm and dry  Capillary refill takes less than 2 seconds  Psychiatric:   Unable to assess   Nursing note and vitals reviewed          Additional Data:     Labs:    Results from last 7 days   Lab Units 10/08/19  1619 10/07/19  0536   WBC Thousand/uL 10 72* 7 37   HEMOGLOBIN g/dL 13 4 14 5   HEMATOCRIT % 39 9 41 8   PLATELETS Thousands/uL 209 209   NEUTROS PCT %  --  74   LYMPHS PCT %  --  17   MONOS PCT %  --  6   EOS PCT %  --  2     Results from last 7 days   Lab Units 10/08/19  1619   SODIUM mmol/L 140   POTASSIUM mmol/L 4 4   CHLORIDE mmol/L 109*   CO2 mmol/L 24   BUN mg/dL 45*   CREATININE mg/dL 2 02*   ANION GAP mmol/L 7   CALCIUM mg/dL 9 1   ALBUMIN g/dL 3 0*   TOTAL BILIRUBIN mg/dL 0 62   ALK PHOS U/L 90   ALT U/L 22   AST U/L 15   GLUCOSE RANDOM mg/dL 83         Results from last 7 days   Lab Units 10/10/19  0706   POC GLUCOSE mg/dl 103                   * I Have Reviewed All Lab Data Listed Above  * Additional Pertinent Lab Tests Reviewed: Trevor 66 Admission Reviewed    Imaging:    Imaging Reports Reviewed Today Include: none  Imaging Personally Reviewed by Myself Includes:  none    Recent Cultures (last 7 days):           Last 24 Hours Medication List:     Current Facility-Administered Medications:  acetaminophen 975 mg Oral [facility] Tuesday M MARC Moreno   amLODIPine 5 mg Oral Daily Sonia Christopher DO   divalproex sodium 500 mg Oral Daily Terri Tay MD   docusate sodium 100 mg Oral BID Terri Tay MD   Labetalol HCl 5 mg Intravenous Q4H PRN Brigitte Hendricks MD   nystatin  Topical BID Kalina Arellano DO   QUEtiapine 6 25 mg Oral HS Brigitte Hendricks MD        Today, Patient Was Seen By: Brigitte Hendricks MD    ** Please Note: Dictation voice to text software may have been used in the creation of this document   **

## 2019-10-10 NOTE — ASSESSMENT & PLAN NOTE
NO AP or AC  MRI and CT head shows stable hemorrhage  Continue PT/OT  Fall precaution  Not likely cause of delirium

## 2019-10-11 LAB
ANION GAP SERPL CALCULATED.3IONS-SCNC: 7 MMOL/L (ref 4–13)
BUN SERPL-MCNC: 41 MG/DL (ref 5–25)
CALCIUM SERPL-MCNC: 8.7 MG/DL (ref 8.3–10.1)
CHLORIDE SERPL-SCNC: 111 MMOL/L (ref 100–108)
CO2 SERPL-SCNC: 24 MMOL/L (ref 21–32)
CREAT SERPL-MCNC: 1.73 MG/DL (ref 0.6–1.3)
GFR SERPL CREATININE-BSD FRML MDRD: 33 ML/MIN/1.73SQ M
GLUCOSE SERPL-MCNC: 106 MG/DL (ref 65–140)
POTASSIUM SERPL-SCNC: 4 MMOL/L (ref 3.5–5.3)
SODIUM SERPL-SCNC: 142 MMOL/L (ref 136–145)

## 2019-10-11 PROCEDURE — 99232 SBSQ HOSP IP/OBS MODERATE 35: CPT | Performed by: NURSE PRACTITIONER

## 2019-10-11 PROCEDURE — 99233 SBSQ HOSP IP/OBS HIGH 50: CPT | Performed by: INTERNAL MEDICINE

## 2019-10-11 PROCEDURE — 80048 BASIC METABOLIC PNL TOTAL CA: CPT | Performed by: INTERNAL MEDICINE

## 2019-10-11 RX ORDER — SODIUM CHLORIDE 9 MG/ML
100 INJECTION, SOLUTION INTRAVENOUS CONTINUOUS
Status: DISCONTINUED | OUTPATIENT
Start: 2019-10-11 | End: 2019-10-12

## 2019-10-11 RX ORDER — GABAPENTIN 100 MG/1
100 CAPSULE ORAL
Status: DISCONTINUED | OUTPATIENT
Start: 2019-10-12 | End: 2019-10-12 | Stop reason: HOSPADM

## 2019-10-11 RX ADMIN — SODIUM CHLORIDE 100 ML/HR: 0.9 INJECTION, SOLUTION INTRAVENOUS at 11:50

## 2019-10-11 RX ADMIN — NYSTATIN: 100000 POWDER TOPICAL at 17:44

## 2019-10-11 RX ADMIN — SODIUM CHLORIDE 100 ML/HR: 0.9 INJECTION, SOLUTION INTRAVENOUS at 21:02

## 2019-10-11 RX ADMIN — NYSTATIN: 100000 POWDER TOPICAL at 09:56

## 2019-10-11 RX ADMIN — DIVALPROEX SODIUM 500 MG: 125 CAPSULE, COATED PELLETS ORAL at 17:34

## 2019-10-11 RX ADMIN — DOCUSATE SODIUM 100 MG: 100 CAPSULE, LIQUID FILLED ORAL at 17:34

## 2019-10-11 NOTE — PROGRESS NOTES
Progress Note - Soraya Galan 80 y o  male MRN: [id number]    [facility] Encounter: [id number]      Assessment/Plan:  1  Encephalopathy  Waxing and waning  Lethargic at present  Pt with periods of restlessness, disoriented  Pt received Seroquel 6 25 mg po last pm,  Sedate today  Recommend d/c Seroquel  Recommend melatonin 6 mg and Neurontin 100 mg po Qhs for insomnia and maintaince  of circadian rhythm  If pt sundown in afternoon consider after dose of Neurontin 100 mg po Q hs  Hoffmann in place for urinary retnetion  Last BM 10/10/19  Encourage po hydration/ nutrition    2  Frailty  Continue fall precautions  Nutrition support  PT/OT  encourage oob and mobilization  Encourage day time activity    3  Hearing Impairment  Recommend amplifier  Speak loudly and clearly    4  Ambulatory dysfunction  Fall precautions  Pt/OT    5  [facility]  Neurology following  Pt on depakote 500mg po Qhs for seizure ppx    6  FRANCOIS  Cr 1 73 (10/11/19)  Not tolerating po at present  Recommend IVF  Internal medicine managing    7  Urinary retention  Failed urinary voiding trials  Hoffmann in place    Spoke with Dr Dolores Ignacio updated on reccomendations          Subjective:   Upon exam pt in bed sleeping, opens eyes for brief periods of time  Pt received Seroquel last pm  Not tolerating po at present  Objective:     Vitals: Blood pressure 122/74, pulse 59, temperature 98 7 °F (37 1 °C), resp  rate 18, height 5' 9" (1 753 m), weight 66 5 kg (146 lb 9 7 oz), SpO2 98 %  ,Body mass index is 21 65 kg/m²  Intake/Output Summary (Last 24 hours) at 10/11/2019 1048  Last data filed at 10/10/2019 1700  Gross per 24 hour   Intake 120 ml   Output 1200 ml   Net -1080 ml       Physical Exam:   General : NAD  HEENT : MMM   Heart : Normal rate, no murmur rub or gallop  Lungs : CTA no wheezes, rales or rhonchi  Abdomen : Soft, NT/ND, BS auscultated in all 4 quads     Ext :  no edema  Skin : Pink, warm, dry, age appropriate turgor and mobility  Neuro : Nonfocal  Psych : sleeping      Invasive Devices     Peripheral Intravenous Line            Peripheral IV 10/11/19 Left;Ventral (anterior) Forearm less than 1 day          Drain            Urethral Catheter Straight-tip 16 Fr  16 days                Lab, Imaging and other studies: I have personally reviewed pertinent reports      VTE Pharmacologic Prophylaxis: Sequential compression device (Venodyne)   VTE Mechanical Prophylaxis: sequential compression device

## 2019-10-11 NOTE — PROGRESS NOTES
Progress Note Migdalia Flowers 1/30/1924, 80 y o  male MRN: [id number]    Unit/Bed#: [facility] 731-01 Encounter: [id number]    Primary Care Provider: Jossie Tellez DO   Date and time admitted to hospital: 9/22/2019  6:37 PM        * SAH (subarachnoid hemorrhage) ([facility] [address] )  Assessment & Plan  NO AP or AC  MRI and CT head shows stable hemorrhage  Continue PT/OT  Fall precaution  Not likely cause of delirium    Encephalopathy  Assessment & Plan  Patient somnolent today since seroquel last night, will d/c and hold sedatives   IV hydration  Maintain circadian rhythm  Discussed with geriatric services and will start gabapentin tomorrow night if patient is more awake      FRANCOIS (acute kidney injury) ([facility] )  Assessment & Plan  Developed FRANCOIS rapidly if not eating or drinking due to sedation  Continue IVF until eating and drinking consistently    A-fib [facility])  Assessment & Plan  Currently sinus  No rate control medicines needed  No AC due to bleed    Urinary retention  Assessment & Plan  The patient has had Barrera catheter placed twice as he has failed voiding trials  Continue barrera catheter for now until more mobile and alert    CKD (chronic kidney disease) stage 3, GFR 30-59 ml/min ([facility])  Assessment & Plan  Unknown baseline renal function, continue to monitor BMP    Coronary artery disease involving native coronary artery of native heart without angina pectoris  Assessment & Plan  S/p remote bare metal stent  Pt on no meds at home    Essential hypertension  Assessment & Plan  Continue Norvasc  Goal SBP < 140  Labetalol prn      VTE Pharmacologic Prophylaxis:   Pharmacologic: Pharmacologic VTE Prophylaxis contraindicated due to [facility]  Mechanical VTE Prophylaxis in Place: Yes    Patient Centered Rounds: I have performed bedside rounds with nursing staff today  Discussions with Specialists or Other Care Team Provider: geriatrics    Education and Discussions with Family / Patient: patient    Time Spent for Care: 30 minutes    More than 50% of total time spent on counseling and coordination of care as described above  Current Length of Stay: 19 day(s)    Current Patient Status: Inpatient   Certification Statement: The patient will continue to require additional inpatient hospital stay due to further titration of meds    Discharge Plan: d/c to rehab when meds stabilized    Code Status: Level 3 - DNAR and DNI      Subjective:   No acute overnight events  This morning patient is very lethargic  He is difficult to wake and requires a sternal rub  Nursing states he didn't eat breakfast     Objective:     Vitals:   Temp (24hrs), Av 7 °F (37 1 °C), Min:98 7 °F (37 1 °C), Max:98 7 °F (37 1 °C)    Temp:  [98 7 °F (37 1 °C)] 98 7 °F (37 1 °C)  HR:  [59-77] 59  Resp:  [18] 18  BP: (122-147)/(74-80) 122/74  SpO2:  [96 %-98 %] 98 %  Body mass index is 21 65 kg/m²  Input and Output Summary (last 24 hours): Intake/Output Summary (Last 24 hours) at 10/11/2019 1605  Last data filed at 10/11/2019 1300  Gross per 24 hour   Intake 360 ml   Output    Net 360 ml       Physical Exam:     Physical Exam   Constitutional: He appears well-developed  malnoursihed   HENT:   Head: Normocephalic and atraumatic  Mouth/Throat: Oropharynx is clear and moist    Eyes: Pupils are equal, round, and reactive to light  Conjunctivae are normal    Neck: Neck supple  No JVD present  Cardiovascular: Normal rate, regular rhythm, normal heart sounds and intact distal pulses  Pulmonary/Chest: Effort normal and breath sounds normal    Abdominal: Soft  Bowel sounds are normal    Musculoskeletal: He exhibits no edema  Neurological:   Unable to assess   Skin: Skin is warm and dry  Capillary refill takes less than 2 seconds  Psychiatric:   Unable to assess   Nursing note and vitals reviewed          Additional Data:     Labs:    Results from last 7 days   Lab Units 10/08/19  1619 10/07/19  0536   WBC Thousand/uL 10 72* 7 37   HEMOGLOBIN g/dL 13 4 14 5   HEMATOCRIT % 39 9 41 8   PLATELETS Thousands/uL 209 209   NEUTROS PCT %  --  74   LYMPHS PCT %  --  17   MONOS PCT %  --  6   EOS PCT %  --  2     Results from last 7 days   Lab Units 10/11/19  0517 10/08/19  1619   SODIUM mmol/L 142 140   POTASSIUM mmol/L 4 0 4 4   CHLORIDE mmol/L 111* 109*   CO2 mmol/L 24 24   BUN mg/dL 41* 45*   CREATININE mg/dL 1 73* 2 02*   ANION GAP mmol/L 7 7   CALCIUM mg/dL 8 7 9 1   ALBUMIN g/dL  --  3 0*   TOTAL BILIRUBIN mg/dL  --  0 62   ALK PHOS U/L  --  90   ALT U/L  --  22   AST U/L  --  15   GLUCOSE RANDOM mg/dL 106 83         Results from last 7 days   Lab Units 10/10/19  0706   POC GLUCOSE mg/dl 103                   * I Have Reviewed All Lab Data Listed Above  * Additional Pertinent Lab Tests Reviewed: All Labs Within Last 24 Hours Reviewed    Imaging:    Imaging Reports Reviewed Today Include: none  Imaging Personally Reviewed by Myself Includes:  none    Recent Cultures (last 7 days):           Last 24 Hours Medication List:     Current Facility-Administered Medications:  acetaminophen 975 mg Oral [facility] [address] Tuesday M MARC Moreno    amLODIPine 5 mg Oral Daily Kumar Hernandez DO    divalproex sodium 500 mg Oral Daily Saulo Waller MD    docusate sodium 100 mg Oral BID Saulo Waller MD    Shawn Jose L ON 10/12/2019] gabapentin 100 mg Oral HS Brandon Vásquez MD    Labetalol HCl 5 mg Intravenous Q4H PRN Brandon Vásquez MD    nystatin  Topical BID Silvana Rae DO    sodium chloride 100 mL/hr Intravenous Continuous Brandon Vásquez MD Last Rate: 100 mL/hr (10/11/19 1150)        Today, Patient Was Seen By: Brandon Vásquez MD    ** Please Note: Dictation voice to text software may have been used in the creation of this document   **

## 2019-10-11 NOTE — RESTORATIVE TECHNICIAN NOTE
Restorative Specialist Mobility Note                      Repositioned: Other (Comment)(Rep /sat pt upright in bed  Bed alarm on   Pt callbell, phone/tray within reach )       Darrel CHOWDARY, Restorative Technician, [employer]

## 2019-10-11 NOTE — ASSESSMENT & PLAN NOTE
Patient somnolent today since seroquel last night, will d/c and hold sedatives   IV hydration  Maintain circadian rhythm  Discussed with geriatric services and will start gabapentin tomorrow night if patient is more awake

## 2019-10-11 NOTE — PHYSICIAN ADVISOR
Current patient class: Inpatient  The patient is currently on Hospital Day: 23      The patient was admitted to the hospital at [place] [address] on 9/22/19 for the following diagnosis:  Altered mental status [R41 82]  SAH (subarachnoid hemorrhage) ([facility] [address] ) [I60 9]     CMS OUTLIER STAY REVIEW    After review of the relevant documentation, labs, vital signs and test results, the patient is appropriate for CONTINUED INPATIENT ADMISSION  The patient continues to remain hospitalized receiving acute medical care  The patient has surpassed the expected duration of stay, however given the clinical condition, need for further acute care management, the patient is appropriate to remain in an inpatient status  The patient still being actively managed, and does have unresolved medical issues requiring further hospitalization  This review is conducted at 10 day intervals, to help satisfy the requirements for significant outlier stay review as per CMS  Given the current condition of this patient, the patient satisfies this review was determination for continued inpatient stay  Rationale is as follows: The patient is a 80 yrs old Male who presented to the ED at 9/22/2019  6:37 PM with a chief complaint of subarachnoid hemorrhage  Patient was admitted to the ICU  The patient presented to [facility] hospital for aphasia and a stroke alert was called  It did show the subarachnoid hemorrhage in the patient came to [place] for further evaluation  Given the patient's cognitive dysfunction it was difficult to provide a good history  When the patient got to the regular floor the patient became agitated attempting to get out of bed multiple times  Patient was noted to be more acutely confused  Patient was also noted to be aphasic secondary to subarachnoid hemorrhage  Patient's workup also included a EEG which no epileptiform activity was noted    Patient also continues to have acute on chronic renal insufficiency which is still currently being monitored  Patient currently is having some psychiatric medications adjusted and is still in the hospital   Jame Piña consultation was also requested  Patient is still also currently needing restraints  Given the need for monitoring of kidney function as well as being acutely confused needing restraints the patient is still inpatient status appropriate    The patients vitals on arrival were ED Triage Vitals   Temperature Pulse Respirations Blood Pressure SpO2   09/22/19 1915 09/22/19 1900 09/22/19 1900 09/22/19 1900 09/22/19 1900   97 9 °F (36 6 °C) 74 19 133/76 99 %      Temp Source Heart Rate Source Patient Position - Orthostatic VS BP Location FiO2 (%)   09/22/19 1915 09/22/19 1900 09/22/19 1900 09/22/19 1900 --   Oral Monitor Lying Right arm       Pain Score       09/22/19 1900       No Pain           Past Medical History:   Diagnosis Date    Arthritis     Athscl heart disease of native coronary artery w/o ang pctrs     Coronary angioplasty status     HTN (hypertension)     Hx of echocardiogram 04/06/2012    EF 0 65, Normal LV function   Hyperlipidemia     Right bundle branch block     Ventricular premature depolarization      Past Surgical History:   Procedure Laterality Date    CARDIAC CATHETERIZATION  04/04/2012    Single vessel CAD;  Successful bare metal stent placed in the distal RCA           Consults have been placed to:   IP CONSULT TO CASE MANAGEMENT  IP CONSULT TO NEUROLOGY  IP CONSULT TO CASE MANAGEMENT  INPATIENT CONSULT TO TRAUMA (INPATIENT ONLY)  IP CONSULT TO GERONTOLOGY  IP CONSULT TO UROLOGY  IP CONSULT TO GERONTOLOGY    Vitals:    10/10/19 0747 10/10/19 0750 10/10/19 0751 10/10/19 1615   BP: 150/80 150/80 150/80 147/80   Pulse: (!) 50 59 62 64   Resp:    18   Temp: (!) 93 4 °F (34 1 °C) (!) 97 1 °F (36 2 °C) (!) 97 1 °F (36 2 °C) 98 7 °F (37 1 °C)   TempSrc:       SpO2: 98% 97% 97% 96%   Weight:       Height:           Most recent labs:    Recent Labs 10/08/19  1619   WBC 10 72*   HGB 13 4   HCT 39 9      K 4 4   CALCIUM 9 1   BUN 45*   CREATININE 2 02*   AST 15   ALT 22   ALKPHOS 90       Scheduled Meds:  Current Facility-Administered Medications:  acetaminophen 975 mg Oral [facility] [address] Tuesday M MARC Moreno   amLODIPine 5 mg Oral Daily Yg Anaya DO   divalproex sodium 500 mg Oral Daily Nayana Peña MD   docusate sodium 100 mg Oral BID Nayana Peña MD   Labetalol HCl 5 mg Intravenous Q4H PRN Meredith Corona MD   nystatin  Topical BID Marlis Dance, DO   QUEtiapine 6 25 mg Oral HS Meredith Corona MD     Continuous Infusions:   PRN Meds: Labetalol HCl    Surgical procedures (if appropriate):

## 2019-10-11 NOTE — OCCUPATIONAL THERAPY NOTE
Occupational Therapy Cancel Note      OT attempted, however pt lethargic and not appropriate for therapy  OT will attempt again next treatment session        [facility]

## 2019-10-12 VITALS
SYSTOLIC BLOOD PRESSURE: 133 MMHG | WEIGHT: 155.42 LBS | DIASTOLIC BLOOD PRESSURE: 73 MMHG | HEART RATE: 85 BPM | BODY MASS INDEX: 23.02 KG/M2 | TEMPERATURE: 98.3 F | HEIGHT: 69 IN | OXYGEN SATURATION: 98 % | RESPIRATION RATE: 18 BRPM

## 2019-10-12 PROBLEM — N17.9 AKI (ACUTE KIDNEY INJURY) (HCC): Status: RESOLVED | Noted: 2019-09-27 | Resolved: 2019-10-12

## 2019-10-12 LAB
ANION GAP SERPL CALCULATED.3IONS-SCNC: 4 MMOL/L (ref 4–13)
BUN SERPL-MCNC: 42 MG/DL (ref 5–25)
CALCIUM SERPL-MCNC: 9 MG/DL (ref 8.3–10.1)
CHLORIDE SERPL-SCNC: 114 MMOL/L (ref 100–108)
CO2 SERPL-SCNC: 26 MMOL/L (ref 21–32)
CREAT SERPL-MCNC: 1.59 MG/DL (ref 0.6–1.3)
ERYTHROCYTE [DISTWIDTH] IN BLOOD BY AUTOMATED COUNT: 13.6 % (ref 11.6–15.1)
GFR SERPL CREATININE-BSD FRML MDRD: 36 ML/MIN/1.73SQ M
GLUCOSE SERPL-MCNC: 81 MG/DL (ref 65–140)
HCT VFR BLD AUTO: 39.8 % (ref 36.5–49.3)
HGB BLD-MCNC: 13.5 G/DL (ref 12–17)
MCH RBC QN AUTO: 31.3 PG (ref 26.8–34.3)
MCHC RBC AUTO-ENTMCNC: 33.9 G/DL (ref 31.4–37.4)
MCV RBC AUTO: 92 FL (ref 82–98)
PLATELET # BLD AUTO: 181 THOUSANDS/UL (ref 149–390)
PMV BLD AUTO: 11.1 FL (ref 8.9–12.7)
POTASSIUM SERPL-SCNC: 4.4 MMOL/L (ref 3.5–5.3)
RBC # BLD AUTO: 4.31 MILLION/UL (ref 3.88–5.62)
SODIUM SERPL-SCNC: 144 MMOL/L (ref 136–145)
WBC # BLD AUTO: 12.51 THOUSAND/UL (ref 4.31–10.16)

## 2019-10-12 PROCEDURE — 85027 COMPLETE CBC AUTOMATED: CPT | Performed by: PHYSICIAN ASSISTANT

## 2019-10-12 PROCEDURE — 80048 BASIC METABOLIC PNL TOTAL CA: CPT | Performed by: INTERNAL MEDICINE

## 2019-10-12 PROCEDURE — 99239 HOSP IP/OBS DSCHRG MGMT >30: CPT | Performed by: INTERNAL MEDICINE

## 2019-10-12 RX ORDER — GABAPENTIN 100 MG/1
100 CAPSULE ORAL ONCE
Status: COMPLETED | OUTPATIENT
Start: 2019-10-12 | End: 2019-10-12

## 2019-10-12 RX ORDER — DIVALPROEX SODIUM 125 MG/1
500 CAPSULE, COATED PELLETS ORAL DAILY
Qty: 30 CAPSULE | Refills: 0 | Status: SHIPPED | OUTPATIENT
Start: 2019-10-12 | End: 2020-03-18 | Stop reason: HOSPADM

## 2019-10-12 RX ORDER — DOCUSATE SODIUM 100 MG/1
100 CAPSULE, LIQUID FILLED ORAL 2 TIMES DAILY
Qty: 10 CAPSULE | Refills: 0 | Status: SHIPPED | OUTPATIENT
Start: 2019-10-12 | End: 2020-03-18 | Stop reason: HOSPADM

## 2019-10-12 RX ORDER — NYSTATIN 100000 [USP'U]/G
POWDER TOPICAL 2 TIMES DAILY
Qty: 15 G | Refills: 0 | Status: SHIPPED | OUTPATIENT
Start: 2019-10-12 | End: 2020-03-18 | Stop reason: HOSPADM

## 2019-10-12 RX ORDER — AMLODIPINE BESYLATE 5 MG/1
5 TABLET ORAL DAILY
Qty: 30 TABLET | Refills: 0 | Status: SHIPPED | OUTPATIENT
Start: 2019-10-13 | End: 2020-03-18 | Stop reason: HOSPADM

## 2019-10-12 RX ORDER — GABAPENTIN 100 MG/1
100 CAPSULE ORAL
Qty: 30 CAPSULE | Refills: 0 | Status: SHIPPED | OUTPATIENT
Start: 2019-10-12 | End: 2020-03-18 | Stop reason: HOSPADM

## 2019-10-12 RX ADMIN — SODIUM CHLORIDE 100 ML/HR: 0.9 INJECTION, SOLUTION INTRAVENOUS at 05:12

## 2019-10-12 RX ADMIN — GABAPENTIN 100 MG: 100 CAPSULE ORAL at 11:21

## 2019-10-12 RX ADMIN — NYSTATIN: 100000 POWDER TOPICAL at 09:19

## 2019-10-12 RX ADMIN — AMLODIPINE BESYLATE 5 MG: 5 TABLET ORAL at 09:11

## 2019-10-12 RX ADMIN — DOCUSATE SODIUM 100 MG: 100 CAPSULE, LIQUID FILLED ORAL at 09:11

## 2019-10-12 NOTE — SOCIAL WORK
Pt for d/c today to [facility], to be transported by [facility] at 12:30pm via bls  CM completed facility transfer and cmn form  Completed and signed cmn form faxed to [facility]  CM notified Pt's son/MALINDA Keyes and facility of d/c arrangements

## 2019-10-12 NOTE — DISCHARGE SUMMARY
Discharge Summary - [place] [address] Internal Medicine    Patient Information: Kary Perdue 80 y o  male MRN: [id number]  Unit/Bed#: [facility] 731-01 Encounter: [id number]    Discharging Physician / Practitioner: Nilsa Ji MD  PCP: Mine Vick DO  Admission Date: 9/22/2019  Discharge Date: 10/12/19    Reason for Admission:  Aphasia    Discharge Diagnoses:     Principal Problem:    SAH (subarachnoid hemorrhage) ([facility] [address] )  Active Problems:    Encephalopathy    A-fib ([facility] )    Essential hypertension    Coronary artery disease involving native coronary artery of native heart without angina pectoris    CKD (chronic kidney disease) stage 3, GFR 30-59 ml/min ([facility] )    Urinary retention  Resolved Problems:    Aphasia    FRANCOIS (acute kidney injury) ([facility] )    Bilateral leg edema      Consultations During Hospital Stay:  · Critical care  · Geriatric Medicine  · Neurology  · Urology    Procedures Performed:   CT head 9/22 shows suspect left central sulcus subarachnoid hemorrhage possible right frontal subarachnoid hemorrhage  Noncontrast brain MRI recommended for further evaluation  CT head and neck showed no evidence of hemodynamically significant stenosis aneurysm or dissection  Debris within the trachea possible aspirational     CT head 9/23 no significant interval change in the left more than right frontal subarachnoid hemorrhage  Stable generalized atrophy and chronic microangiopathic disease    CT cervical spine no cervical spine fracture or traumatic malalignment    CT head 9/26 no significant change in left frontal subarachnoid hemorrhage  No new hemorrhage identified  Stable advanced chronic microangiopathic change    MRI brain 9/26/19 no definitive acute ischemia  Acute subarachnoid hemorrhages identified within left frontal cortex  Under center and sensitive imaging demonstrates 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, most prominent within the both frontal and parietal james disease    I have diffuse cerebral volume loss and moderate chronic microangiopathic change  No definitive acute infarction identified  Echocardiogram showed systolic function was normal   Ejection fraction is estimated 65%  There were no regional wall motion abnormalities  Grade 1 diastolic dysfunction  Significant Findings:   · None    Incidental Findings:   · None     Test Results Pending at Discharge (will require follow up): · None     Outpatient Tests Requested:  · None    Complications:  Urinary retention    Hospital Course:     Shola Zhao is a 80 y o  male patient who originally presented to the hospital on 9/22/2019 due to a seizure  Patient presented to [facility] minor CR where stroke alert was activated for a facial   CT scan showed left central sulcus subarachnoid had hemorrhage and possible right frontal subarachnoid hemorrhage  Patient was then transferred to [facility] for video EEG monitoring  Per family patient has had a gradual decline over the last several months with difficulty speaking and not sleeping and wandering house patient was confused on arrival to the ER  Neurology was consulted and video EEG monitoring was initiated  CABG was negative and geriatric Medicine was consulted as patient would have periods of agitation  Different antipsychotics was tried the patient on have prolonged periods of somnolence after medications  Patient was also noted to have a KI which was treated with IV fluids  Ultimately patient was stabilized with IV fluids to reverse is a KI and gabapentin q h s  For his mood  Neurology recommended no anti-platelet or anticoagulation given his subarachnoid hemorrhage  As his subarachnoid hemorrhage occurred without any fall or medication induced  Seizure prophylaxis was continued on with Depakote 500 mg daily which patient tolerated well  Patient was discharged to nursing facility for ongoing rehab    Patient's hospitalization was complicated by urinary retention  Urology was consulted which recommended indwelling Hoffmann catheter and discharge with follow-up in 4- 6 weeks upon discharge in the outpatient office with Dr Rina Ann to address further plans with therapy indwelling catheter versus suprapubic catheter versus voiding trials  Urology office to make appointment on discharge  Condition at Discharge: fair     Discharge Day Visit / Exam:     Subjective:  No acute overnight events  Patient is more alert and oriented this morning  He denies any chest pain, shortness of breath, lightheadedness, headaches, blurry vision  Vitals: Blood Pressure: 133/73 (10/12/19 0759)  Pulse: 85 (10/12/19 0726)  Temperature: 98 3 °F (36 8 °C) (10/12/19 0726)  Temp Source: Axillary (10/12/19 0230)  Respirations: 18 (10/10/19 2201)  Height: 5' 9" (175 3 cm) (09/22/19 1900)  Weight - Scale: 70 5 kg (155 lb 6 8 oz) (10/12/19 0600)  SpO2: 98 % (10/12/19 0726)  Exam:   Physical Exam   Constitutional: He appears well-developed and well-nourished  HENT:   Head: Normocephalic and atraumatic  Mouth/Throat: Oropharynx is clear and moist    Eyes: Pupils are equal, round, and reactive to light  Conjunctivae are normal    Neck: Neck supple  No JVD present  Cardiovascular: Normal rate, regular rhythm, normal heart sounds and intact distal pulses  Pulmonary/Chest: Effort normal and breath sounds normal    Abdominal: Soft  Bowel sounds are normal    Musculoskeletal: He exhibits no edema  Neurological: He is alert  Oriented to person   Skin: Skin is warm and dry  Capillary refill takes less than 2 seconds  Psychiatric: He has a normal mood and affect  His behavior is normal  Judgment and thought content normal    Nursing note and vitals reviewed  Discharge instructions/Information to patient and family:   See after visit summary for information provided to patient and family        Provisions for Follow-Up Care:  See after visit summary for information related to follow-up care and any pertinent home health orders  Disposition:     [place] [facility] [address] [facility] to [facility] SNF:   · Not Applicable to this Patient - Not Applicable to this Patient    Planned Readmission: no     Discharge Statement:  I spent 38 minutes discharging the patient  This time was spent on the day of discharge  I had direct contact with the patient on the day of discharge  Greater than 50% of the total time was spent examining patient, answering all patient questions, arranging and discussing plan of care with patient as well as directly providing post-discharge instructions  Additional time then spent on discharge activities  Discharge Medications:  See after visit summary for reconciled discharge medications provided to patient and family  ** Please Note: Dragon 360 Dictation voice to text software may have been used in the creation of this document   **

## 2019-10-12 NOTE — TRANSPORTATION MEDICAL NECESSITY
Section I - General Information    Name of Patient: Duncan Mosqueda                 : 1924    Medicare #: [id number]  Transport Date: 10/12/19 (PCS is valid for round trips on this date and for all repetitive trips in the 60-day range as noted below )  Origin: [address] [facility]                                                         Destination: [facility]    Is the pt's stay covered under Medicare Part A (PPS/DRG)   [x]     Closest appropriate facility? If no, why is transport to more distant facility required? Yes  If hospice pt, is this transport related to pt's terminal illness? No       Section II - Medical Necessity Questionnaire  Ambulance transportation is medically necessary only if other means of transport are contraindicated or would be potentially harmful to the patient  To meet this requirement, the patient must either be "bed confined" or suffer from a condition such that transport by means other than ambulance is contraindicated by the patient's condition  The following questions must be answered by the medical professional signing below for this form to be valid:    1)  Describe the MEDICAL CONDITION (physical and/or mental) of this patient AT [address] [place] [address] that requires the patient to be transported in an ambulance and why transport by other means is contraindicated by the patient's condition: Altered mental status, high fall risk, assistx2, impulsive requiring restraints at times and continued supervision    2) Is the patient "bed confined" as defined below? No  To be "be confined" the patient must satisfy all three of the following conditions: (1) unable to get up from bed without Assistance; AND (2) unable to ambulate; AND (3) unable to sit in a chair or wheelchair  3) Can this patient safely be transported by car or wheelchair van (i e , seated during transport without a medical attendant or monitoring)?    No    4) In addition to completing questions 1-3 above, please check any of the following conditions that apply*:   *Note: supporting documentation for any boxes checked must be maintained in the patient's medical records  If hosp-hosp transfer, describe services needed at 2nd facility not available at 1st facility? Patient is confused  Need or possible need for restraints   Unable to tolerate seated position for time needed to transport   Other(specify) Impulsive requiring continued supervision       Section III - Signature of Physician or Healthcare Professional  I certify that the above information is true and correct based on my evaluation of this patient, and represent that the patient requires transport by ambulance and that other forms of transport are contraindicated  I understand that this information will be used by the Centers for Medicare and Medicaid Services (CMS) to support the determination of medical necessity for ambulance services, and I represent that I have personal knowledge of the patient's condition at time of transport  []  If this box is checked, I also certify that the patient is physically or mentally incapable of signing the ambulance service's claim and that the institution with which I am affiliated has furnished care, services, or assistance to the patient  My signature below is made on behalf of the patient pursuant to 42 CFR §424 36(b)(4)  In accordance with 42 CFR §424 37, the specific reason(s) that the patient is physically or mentally incapable of signing the claim form is as follows:  Carlo Post of Physician* or Healthcare Professional______________________________________________________________  Signature Date 10/12/19 (For scheduled repetitive transports, this form is not valid for transports performed more than 60 days after this date)    Printed Name & Credentials of Physician or Healthcare Professional (MD, DO, RN, etc )________________________________  *Form must be signed by patient's attending physician for scheduled, repetitive transports   For non-repetitive, unscheduled ambulance transports, if unable to obtain the signature of the attending physician, any of the following may sign (choose appropriate option below)  [] Physician Assistant []  Clinical Nurse Specialist []  Registered Nurse  []  Nurse Practitioner  [x] Discharge Planner

## 2019-10-12 NOTE — PROGRESS NOTES
Spoke with SLIM about patients urine being very dark with blood tinge and sediment  Blood work orders placed and PA will advise day team on sign out  Rn will continue to monitor patient

## 2019-10-14 ENCOUNTER — TELEPHONE (OUTPATIENT)
Dept: UROLOGY | Facility: MEDICAL CENTER | Age: 84
End: 2019-10-14

## 2019-10-14 ENCOUNTER — OFFICE VISIT (OUTPATIENT)
Dept: UROLOGY | Facility: CLINIC | Age: 84
End: 2019-10-14
Payer: MEDICARE

## 2019-10-14 VITALS
DIASTOLIC BLOOD PRESSURE: 70 MMHG | HEIGHT: 69 IN | SYSTOLIC BLOOD PRESSURE: 120 MMHG | HEART RATE: 78 BPM | WEIGHT: 155 LBS | BODY MASS INDEX: 22.96 KG/M2

## 2019-10-14 DIAGNOSIS — R33.9 URINARY RETENTION: Primary | ICD-10-CM

## 2019-10-14 PROCEDURE — 99213 OFFICE O/P EST LOW 20 MIN: CPT | Performed by: UROLOGY

## 2019-10-14 NOTE — TELEPHONE ENCOUNTER
[facility] requesting to bring patient to remove catheter, patient trying to remove himself  [facility] is a non-restraint facility  Aura Parsons from [facility] confirmed to bring the patient at 12:45  Requesting Dr Jennifer Bourgeois to call the family to discuss exchange uretheral cath or arrange for Supra placement per note from Dr Nick Talley  Thank you

## 2019-10-14 NOTE — PROGRESS NOTES
[address] [facility]  [facility]  [address] [facility]  [place], [address] [place] [address]  [phone]  www  [email or web address]  [email or web address]      NAME: Jarvis Lanza  AGE: 80 y o  SEX: male  : 1924   MRN: [id number]    DATE: 10/14/2019  TIME: 12:40 PM    Assessment and Plan:    Intermittent urinary retention, exacerbated by recent subarachnoid hemorrhage and encephalopathy  Attempted to remove catheter by himself at nursing home, which is not a facility that allows restraints  We therefore removed the catheter and we will see how things go  My recommendation is to not replace the catheter unless he is visibly uncomfortable and unable to void  The only other alternative, would be to place a Hoffmann catheter or suprapubic tube and have him moved to a facility where they can restrain him  Follow-up with us as needed  Chief Complaint   No chief complaint on file  History of Present Illness   New patient office visit:  80-year-old man who is a resident of [facility] with indwelling Hoffmann catheter-we are contacted by the facility today saying he is threatening to pull the catheter out himself  The facility wants him seen so we worked him in  We have agreed to remove the catheter in the office, then to discuss further management with his family such as suprapubic tube in the future  If he cannot urinate he will left to go to the emergency department and may even have to be transferred to a restraints facility so he does not pull the catheter out as he poses a danger to himself in that way  He has history of you intermittent urinary retention and was seen by Dr Laith Romero 2019 for the same  I spoke with his Mikie Escobedo and explained the situation to him  He is in agreement with the plan of leaving a catheter out and seeing how things go        The following portions of the patient's history were reviewed and updated as appropriate: allergies, current medications, past family history, past medical history, past social history, past surgical history and problem list   Past Medical History:   Diagnosis Date    Arthritis     Athscl heart disease of native coronary artery w/o ang pctrs     Coronary angioplasty status     HTN (hypertension)     Hx of echocardiogram 04/06/2012    EF 0 65, Normal LV function   Hyperlipidemia     Right bundle branch block     Ventricular premature depolarization      Past Surgical History:   Procedure Laterality Date    CARDIAC CATHETERIZATION  04/04/2012    Single vessel CAD; Successful bare metal stent placed in the distal RCA     shoulder  Review of Systems   Review of Systems    Active Problem List     Patient Active Problem List   Diagnosis    Mixed hyperlipidemia    Essential hypertension    Coronary artery disease involving native coronary artery of native heart without angina pectoris    A-fib ([facility] [address] )    [facility]oid [facility]) ([facility])    Encephalopathy    CKD (chronic kidney disease) stage 3, GFR 30-59 ml/min ([facility])    Urinary retention       Objective   There were no vitals taken for this visit  Physical Exam   Constitutional: He appears well-developed and well-nourished  No distress  HENT:   Head: Normocephalic and atraumatic  Eyes: EOM are normal    Neck:   Rigidity overall   Pulmonary/Chest: Effort normal  No stridor  No respiratory distress  He has no wheezes  Abdominal: Soft  Genitourinary: Penis normal    Genitourinary Comments: Normal circumcised phallus, solitary testicle  This is normal   No sign of epididymitis or orchitis  Musculoskeletal:   Moves rigidly and slowly, and is on stretcher  Neurological:   Hard of hearing, slow speech, poor historian  Skin: Skin is warm and dry  He is not diaphoretic  Psychiatric: He has a normal mood and affect  Awake, but poor historian, dysarthric             Current Medications     Current Outpatient Medications:     amLODIPine (NORVASC) 5 mg tablet, Take 1 tablet (5 mg total) by mouth daily, Disp: 30 tablet, Rfl: 0    divalproex sodium (DEPAKOTE SPRINKLE) 125 MG capsule, Take 4 capsules (500 mg total) by mouth daily, Disp: 30 capsule, Rfl: 0    docusate sodium (COLACE) 100 mg capsule, Take 1 capsule (100 mg total) by mouth 2 (two) times a day, Disp: 10 capsule, Rfl: 0    gabapentin (NEURONTIN) 100 mg capsule, Take 1 capsule (100 mg total) by mouth daily at bedtime, Disp: 30 capsule, Rfl: 0    nystatin (MYCOSTATIN) powder, Apply topically 2 (two) times a day, Disp: 15 g, Rfl: 0  Time-30 minutes      Arden Lemos MD

## 2019-10-14 NOTE — TELEPHONE ENCOUNTER
NEW PATIENT  S/P Hospital admission    Now at [facility]  Patient is in need of Catheter removal asap  Has medicare insurance  Unknown Urology history  Prefers [place] office  Michael Moreau with [facility] can be reached at [phone]  Thank you

## 2019-10-17 ENCOUNTER — TELEPHONE (OUTPATIENT)
Dept: NEUROLOGY | Facility: CLINIC | Age: 84
End: 2019-10-17

## 2019-10-17 NOTE — TELEPHONE ENCOUNTER
The purpose of this phone call is to assess patient's general wellbeing or for any assistance needed with follow-up care  Patient needs hospital follow up appointment scheduled  According to chart, patient discharged to [facility]; [phone]  Called facility with no answer  Left message on voicemail box for call back

## 2019-10-17 NOTE — TELEPHONE ENCOUNTER
RN Jatinder Cueto returned my call  Since discharge, patient has not experienced any new or worsening stroke symptoms  Patient continues to be dysarthric, no residual weakness  He is incontinent, barrera was removed  Confused at times, ambulates with a walker  Requires assistance with ADLs  No PCP follow until after discharge  No estimated discharge date at this time  I reviewed medications with her, no changes since discharge  Patient taking all as prescribed with no missed doses or medication side effects  Average /60s  States transport scheduled appointments  States to call  to speak with Guille Balbuena in transport  Called with no answer  Left message on voicemail ox for call back

## 2019-10-18 NOTE — TELEPHONE ENCOUNTER
Evelyn Shankar with transport  There was no answer  Left message on voicemail box for call back  Second attempt to schedule appointment failed  Closing encounter

## 2019-10-18 NOTE — TELEPHONE ENCOUNTER
Quan returned my call  Scheduled patient 12/31 with Dr Mali Douglass in [place]   Added patient to [place] waitlist

## 2019-11-07 ENCOUNTER — TELEPHONE (OUTPATIENT)
Dept: NEUROLOGY | Facility: CLINIC | Age: 84
End: 2019-11-07

## 2019-11-07 NOTE — TELEPHONE ENCOUNTER
21/30 day post discharge follow up call  The purpose of this phone call is to assess patient's general wellbeing or for any assistance needed with follow-up care  Please see my telephone encounter from 10/17 for 7 day follow up  [facility]; [phone],  Mackenzie connected me to [facility]  Since discharge, he has not experienced any new or worsening stroke symptoms  Speech is nearly clear, diet upgraded to mechanical soft with thin liquids  Continued to be nonambulatory  Requires assistance of 2 people for transfers to wheelchair to mobilize  Oriented, continent, requires assistance with ADLs, able to feed self  Last /69  Reviewed medications  Finishing 12 days of Antibiotic for UTI  Flomax and trazodone also started  Gabapentin discontinued 10/28  No side effects  Stroke hospital follow up appointment scheduled 12/31 with Dr Perry Mcdonald does not have any questions or concerns at this time

## 2019-12-27 ENCOUNTER — TELEPHONE (OUTPATIENT)
Dept: NEUROLOGY | Facility: CLINIC | Age: 84
End: 2019-12-27

## 2020-01-06 ENCOUNTER — TELEPHONE (OUTPATIENT)
Dept: NEUROLOGY | Facility: CLINIC | Age: 85
End: 2020-01-06

## 2020-01-06 NOTE — TELEPHONE ENCOUNTER
Received voicemail from Warren Velasquez at [facility] stating apt was cancelled through automated call system  Was scheduled 12/31  Next scheduled 3/25  Warren Velasquez asking for ASAP apt  Best call back [phone]  Can you please assist with rescheduling?  Thank you!!

## 2020-01-06 NOTE — TELEPHONE ENCOUNTER
Ramona Hammans called in asking to schedule ASAP  Scheduled 1/9 with Dr Johnny Del Valle to make her aware  Sent a message to Enoch to make her aware as well

## 2020-01-09 ENCOUNTER — APPOINTMENT (EMERGENCY)
Dept: RADIOLOGY | Facility: HOSPITAL | Age: 85
End: 2020-01-09
Payer: MEDICARE

## 2020-01-09 ENCOUNTER — HOSPITAL ENCOUNTER (EMERGENCY)
Facility: HOSPITAL | Age: 85
Discharge: NURSING, REHAB OR LONG-TERM CARE | End: 2020-01-09
Attending: EMERGENCY MEDICINE
Payer: MEDICARE

## 2020-01-09 VITALS
RESPIRATION RATE: 28 BRPM | OXYGEN SATURATION: 99 % | HEART RATE: 84 BPM | SYSTOLIC BLOOD PRESSURE: 161 MMHG | TEMPERATURE: 97.6 F | DIASTOLIC BLOOD PRESSURE: 93 MMHG

## 2020-01-09 DIAGNOSIS — N39.0 UTI (URINARY TRACT INFECTION): Primary | ICD-10-CM

## 2020-01-09 DIAGNOSIS — R21 SKIN ERUPTION: ICD-10-CM

## 2020-01-09 DIAGNOSIS — I95.9 HYPOTENSION: ICD-10-CM

## 2020-01-09 LAB
ALBUMIN SERPL BCP-MCNC: 2.7 G/DL (ref 3.5–5)
ALP SERPL-CCNC: 89 U/L (ref 46–116)
ALT SERPL W P-5'-P-CCNC: 33 U/L (ref 12–78)
ANION GAP SERPL CALCULATED.3IONS-SCNC: 4 MMOL/L (ref 4–13)
AST SERPL W P-5'-P-CCNC: 23 U/L (ref 5–45)
ATRIAL RATE: 197 BPM
BACTERIA UR QL AUTO: ABNORMAL /HPF
BASOPHILS # BLD AUTO: 0.06 THOUSANDS/ΜL (ref 0–0.1)
BASOPHILS NFR BLD AUTO: 1 % (ref 0–1)
BILIRUB SERPL-MCNC: 0.41 MG/DL (ref 0.2–1)
BILIRUB UR QL STRIP: NEGATIVE
BUN SERPL-MCNC: 33 MG/DL (ref 5–25)
CALCIUM SERPL-MCNC: 9.1 MG/DL (ref 8.3–10.1)
CHLORIDE SERPL-SCNC: 110 MMOL/L (ref 100–108)
CLARITY UR: ABNORMAL
CO2 SERPL-SCNC: 23 MMOL/L (ref 21–32)
COLOR UR: YELLOW
CREAT SERPL-MCNC: 1.41 MG/DL (ref 0.6–1.3)
EOSINOPHIL # BLD AUTO: 0.62 THOUSAND/ΜL (ref 0–0.61)
EOSINOPHIL NFR BLD AUTO: 8 % (ref 0–6)
ERYTHROCYTE [DISTWIDTH] IN BLOOD BY AUTOMATED COUNT: 12.9 % (ref 11.6–15.1)
GFR SERPL CREATININE-BSD FRML MDRD: 42 ML/MIN/1.73SQ M
GLUCOSE SERPL-MCNC: 87 MG/DL (ref 65–140)
GLUCOSE UR STRIP-MCNC: NEGATIVE MG/DL
HCT VFR BLD AUTO: 37 % (ref 36.5–49.3)
HGB BLD-MCNC: 11.9 G/DL (ref 12–17)
HGB UR QL STRIP.AUTO: ABNORMAL
IMM GRANULOCYTES # BLD AUTO: 0.03 THOUSAND/UL (ref 0–0.2)
IMM GRANULOCYTES NFR BLD AUTO: 0 % (ref 0–2)
KETONES UR STRIP-MCNC: NEGATIVE MG/DL
LEUKOCYTE ESTERASE UR QL STRIP: ABNORMAL
LYMPHOCYTES # BLD AUTO: 1.57 THOUSANDS/ΜL (ref 0.6–4.47)
LYMPHOCYTES NFR BLD AUTO: 20 % (ref 14–44)
MCH RBC QN AUTO: 31.5 PG (ref 26.8–34.3)
MCHC RBC AUTO-ENTMCNC: 32.2 G/DL (ref 31.4–37.4)
MCV RBC AUTO: 98 FL (ref 82–98)
MONOCYTES # BLD AUTO: 0.6 THOUSAND/ΜL (ref 0.17–1.22)
MONOCYTES NFR BLD AUTO: 8 % (ref 4–12)
NEUTROPHILS # BLD AUTO: 4.9 THOUSANDS/ΜL (ref 1.85–7.62)
NEUTS SEG NFR BLD AUTO: 63 % (ref 43–75)
NITRITE UR QL STRIP: POSITIVE
NON-SQ EPI CELLS URNS QL MICRO: ABNORMAL /HPF
NRBC BLD AUTO-RTO: 0 /100 WBCS
P AXIS: 270 DEGREES
PH UR STRIP.AUTO: 6.5 [PH]
PLATELET # BLD AUTO: 166 THOUSANDS/UL (ref 149–390)
PMV BLD AUTO: 10.2 FL (ref 8.9–12.7)
POTASSIUM SERPL-SCNC: 4.4 MMOL/L (ref 3.5–5.3)
PROT SERPL-MCNC: 7.1 G/DL (ref 6.4–8.2)
PROT UR STRIP-MCNC: ABNORMAL MG/DL
QRS AXIS: -64 DEGREES
QRSD INTERVAL: 114 MS
QT INTERVAL: 356 MS
QTC INTERVAL: 413 MS
RBC # BLD AUTO: 3.78 MILLION/UL (ref 3.88–5.62)
RBC #/AREA URNS AUTO: ABNORMAL /HPF
SODIUM SERPL-SCNC: 137 MMOL/L (ref 136–145)
SP GR UR STRIP.AUTO: 1.02 (ref 1–1.03)
T WAVE AXIS: -14 DEGREES
UROBILINOGEN UR QL STRIP.AUTO: 0.2 E.U./DL
VENTRICULAR RATE: 81 BPM
WBC # BLD AUTO: 7.78 THOUSAND/UL (ref 4.31–10.16)
WBC #/AREA URNS AUTO: ABNORMAL /HPF

## 2020-01-09 PROCEDURE — 93005 ELECTROCARDIOGRAM TRACING: CPT

## 2020-01-09 PROCEDURE — 87086 URINE CULTURE/COLONY COUNT: CPT | Performed by: EMERGENCY MEDICINE

## 2020-01-09 PROCEDURE — 99285 EMERGENCY DEPT VISIT HI MDM: CPT

## 2020-01-09 PROCEDURE — 80053 COMPREHEN METABOLIC PANEL: CPT | Performed by: EMERGENCY MEDICINE

## 2020-01-09 PROCEDURE — 85025 COMPLETE CBC W/AUTO DIFF WBC: CPT | Performed by: EMERGENCY MEDICINE

## 2020-01-09 PROCEDURE — 71045 X-RAY EXAM CHEST 1 VIEW: CPT

## 2020-01-09 PROCEDURE — 87186 SC STD MICRODIL/AGAR DIL: CPT | Performed by: EMERGENCY MEDICINE

## 2020-01-09 PROCEDURE — 99283 EMERGENCY DEPT VISIT LOW MDM: CPT | Performed by: EMERGENCY MEDICINE

## 2020-01-09 PROCEDURE — 93010 ELECTROCARDIOGRAM REPORT: CPT | Performed by: INTERNAL MEDICINE

## 2020-01-09 PROCEDURE — 36415 COLL VENOUS BLD VENIPUNCTURE: CPT | Performed by: EMERGENCY MEDICINE

## 2020-01-09 PROCEDURE — 87077 CULTURE AEROBIC IDENTIFY: CPT | Performed by: EMERGENCY MEDICINE

## 2020-01-09 PROCEDURE — 81001 URINALYSIS AUTO W/SCOPE: CPT | Performed by: EMERGENCY MEDICINE

## 2020-01-09 RX ORDER — CEPHALEXIN 250 MG/1
500 CAPSULE ORAL ONCE
Status: COMPLETED | OUTPATIENT
Start: 2020-01-09 | End: 2020-01-09

## 2020-01-09 RX ADMIN — CEPHALEXIN 500 MG: 250 CAPSULE ORAL at 18:31

## 2020-01-09 NOTE — DISCHARGE INSTRUCTIONS
Your blood pressures and vital signs in the Emergency Department were within normal limits  Your labs reveal baseline chronic kidney disease, no evidence of white count elevation, but do reveal a urinary tract infection  Your Hoffmann catheter was replaced in the emergency department today  You received your first dose of cephalexin for your urinary tract infection  Please take cephalexin and complete the entire course of antibiotic  Follow up with the primary care physician for re-evaluation of symptoms  Return to emergency department if you are having fevers, increased confusion, or abnormal vital signs, or as needed

## 2020-01-09 NOTE — ED NOTES
Pt very active in trying to get down from bed  Moving all legs and arms appropriately and equally       Daniel Johnson RN  01/09/20 [id number]

## 2020-01-09 NOTE — ED PROVIDER NOTES
EMERGENCY DEPARTMENT ENCOUNTER NOTE  ? CHIEF COMPLAINT  Chief Complaint   Patient presents with    Hypotension     pt was at appt at neurology office for previous stroke and sent to ED for hypotension  per office pt's pressure was in the 90's  per ems pt's pressure's were 120-130sbp       HPI  Jarvis Lanza is a 80 y o  male with PMH of Alzheimer's dementia, spontaneous subarachnoid hemorrhages, coronary artery disease, hypertension, urinary retention with indwelling Hoffmann catheter, presenting from his neurologist's office due to blood pressures down to 70s over 40s  Patient was at his neurologist appointment for follow-up after prior stroke, and was found to have blood pressures down to 70/40, with patient slumped over in the chair  To EMS, blood pressures were 611H to 486 systolic, and they are similar on arrival   We are unable to obtain reliable history of present illness is patient has underlying dementia  Overall, patient denies chest pain, shortness of breath, abdominal pain, fever  He reports pain in his left shoulder, although he winces when his right shoulder is moved  REVIEW OF SYSTEMS is limited by patient's history of dementia  Constitutional: ?Denies fevers, chills  ENT: Denies sore throat  CV: Denies chest pain   Resp: Denies shortness of breath or coughing  GI: ?Denies abdominal pain, vomiting, or diarrhea   Skin:  Denies rashes, although does appear to have a rash to his chest and abdomen  Neuro: No headache  Has a history of spontaneous subarachnoid hemorrhage  Ten systems were reviewed otherwise were unremarkable    PAST MEDICAL HISTORY  Past Medical History:   Diagnosis Date    Arthritis     Athscl heart disease of native coronary artery w/o ang pctrs     Coronary angioplasty status     HTN (hypertension)     Hx of echocardiogram 04/06/2012    EF 0 65, Normal LV function      Hyperlipidemia     Right bundle branch block     Ventricular premature depolarization        SURGICAL HISTORY  Past Surgical History:   Procedure Laterality Date    CARDIAC CATHETERIZATION  04/04/2012    Single vessel CAD; Successful bare metal stent placed in the distal RCA       FAMILY HISTORY  Family History   Problem Relation Age of Onset    No Known Problems Family         noncontributory        CURRENT MEDICATIONS  No current facility-administered medications on file prior to encounter        Current Outpatient Medications on File Prior to Encounter   Medication Sig    amLODIPine (NORVASC) 5 mg tablet Take 1 tablet (5 mg total) by mouth daily    divalproex sodium (DEPAKOTE SPRINKLE) 125 MG capsule Take 4 capsules (500 mg total) by mouth daily    docusate sodium (COLACE) 100 mg capsule Take 1 capsule (100 mg total) by mouth 2 (two) times a day    gabapentin (NEURONTIN) 100 mg capsule Take 1 capsule (100 mg total) by mouth daily at bedtime    nystatin (MYCOSTATIN) powder Apply topically 2 (two) times a day       ALLERGIES  No Known Allergies    SOCIAL HISTORY  Social History     Socioeconomic History    Marital status: /Civil Union     Spouse name: None    Number of children: None    Years of education: None    Highest education level: None   Occupational History    None   Social Needs    Financial resource strain: None    Food insecurity:     Worry: None     Inability: None    Transportation needs:     Medical: None     Non-medical: None   Tobacco Use    Smoking status: Never Smoker    Smokeless tobacco: Never Used   Substance and Sexual Activity    Alcohol use: Not Currently    Drug use: Never    Sexual activity: None   Lifestyle    Physical activity:     Days per week: None     Minutes per session: None    Stress: None   Relationships    Social connections:     Talks on phone: None     Gets together: None     Attends Anabaptist service: None     Active member of club or organization: None     Attends meetings of clubs or organizations: None     Relationship status: None    Intimate partner violence:     Fear of current or ex partner: None     Emotionally abused: None     Physically abused: None     Forced sexual activity: None   Other Topics Concern    None   Social History Narrative    None       PHYSICAL EXAM    /67 (BP Location: Right arm)   Pulse 72   Temp 97 6 °F (36 4 °C) (Oral)   Resp 20   SpO2 99%   Vital signs and nursing notes reviewed  CONSTITUTIONAL: male appearing stated age resting in bed, in no acute distress  HEENT: atraumatic, normocephalic  Sclera anicteric, conjunctiva are not injected  Moist oral mucosa  CARDIOVASCULAR/CHEST: RRR, holosystolic murmur is present in right upper and left upper sternal borders, 4/6,  2+ radial pulses  PULMONARY: Breathing comfortably on RA  Breath sounds are equal and clear to auscultation  ABDOMEN: non-distended  BS present, normoactive  Non-tender  Indwelling Hoffmann catheter is present with significant sediment as well as some mucus strands in the urine and in the tubing  MSK: moves all extremities, no deformities, no peripheral edema  NEURO: Awake and alert  Face symmetric  Moves all extremities spontaneously  No obvious focal neurologic deficits  SKIN: Warm, appears well-perfused  There is a diffuse papular rash involving neck, chest, abdomen, and upper back, with significant excoriations involving the abdomen  There is no spreading erythema around any of the lesions or excoriations to suggest bacterial superinfection  There is are no mucous surface lesions  There are no intertriginous rashes or tracks to suggest scabies infection  MENTAL STATUS: Normal affect, confused which appears to be baseline  ?   LABS AND TESTS    Results Reviewed     Procedure Component Value Units Date/Time    UA w Reflex to Microscopic w Reflex to Culture [[id number]]  (Abnormal) Collected:  01/09/20 1503    Lab Status:  Edited Result - FINAL Specimen:  Urine, Indwelling Hoffmann Catheter Updated:  01/09/20 1622     Color, UA Yellow Clarity, UA Turbid     Specific [place], UA 1 016     pH, UA 6 5     Leukocytes, UA Large     Nitrite, UA Positive     Protein,  (2+) mg/dl      Glucose, UA Negative mg/dl      Ketones, UA Negative mg/dl      Urobilinogen, UA 0 2 E U /dl      Bilirubin, UA Negative     Blood, UA Large    Urine Microscopic [[id number]]  (Abnormal) Collected:  01/09/20 1503    Lab Status:  Final result Specimen:  Urine, Indwelling Hoffmann Catheter Updated:  01/09/20 1614     RBC, UA Innumerable /hpf      WBC, UA Innumerable /hpf      Epithelial Cells       Field obscured, unable to enumerate     /hpf     Bacteria, UA Innumerable /hpf     Urine culture [[id number]] Collected:  01/09/20 1503    Lab Status:   In process Specimen:  Urine, Indwelling Hoffmann Catheter Updated:  01/09/20 1614    Comprehensive metabolic panel [[id number]]  (Abnormal) Collected:  01/09/20 1203    Lab Status:  Final result Specimen:  Blood from Arm, Right Updated:  01/09/20 1255     Sodium 137 mmol/L      Potassium 4 4 mmol/L      Chloride 110 mmol/L      CO2 23 mmol/L      ANION GAP 4 mmol/L      BUN 33 mg/dL      Creatinine 1 41 mg/dL      Glucose 87 mg/dL      Calcium 9 1 mg/dL      AST 23 U/L      ALT 33 U/L      Alkaline Phosphatase 89 U/L      Total Protein 7 1 g/dL      Albumin 2 7 g/dL      Total Bilirubin 0 41 mg/dL      eGFR 42 ml/min/1 73sq m     Narrative:       [place] guidelines for Chronic Kidney Disease (CKD):     Stage 1 with normal or high GFR (GFR > 90 mL/min/1 73 square meters)    Stage 2 Mild CKD (GFR = 60-89 mL/min/1 73 square meters)    Stage 3A Moderate CKD (GFR = 45-59 mL/min/1 73 square meters)    Stage 3B Moderate CKD (GFR = 30-44 mL/min/1 73 square meters)    Stage 4 Severe CKD (GFR = 15-29 mL/min/1 73 square meters)    Stage 5 End Stage CKD (GFR <15 mL/min/1 73 square meters)  Note: GFR calculation is accurate only with a steady state creatinine    CBC and differential [[id number]]  (Abnormal) Collected:  01/09/20 1203    Lab Status:  Final result Specimen:  Blood from Arm, Right Updated:  01/09/20 1214     WBC 7 78 Thousand/uL      RBC 3 78 Million/uL      Hemoglobin 11 9 g/dL      Hematocrit 37 0 %      MCV 98 fL      MCH 31 5 pg      MCHC 32 2 g/dL      RDW 12 9 %      MPV 10 2 fL      Platelets 631 Thousands/uL      nRBC 0 /100 WBCs      Neutrophils Relative 63 %      Immat GRANS % 0 %      Lymphocytes Relative 20 %      Monocytes Relative 8 %      Eosinophils Relative 8 %      Basophils Relative 1 %      Neutrophils Absolute 4 90 Thousands/µL      Immature Grans Absolute 0 03 Thousand/uL      Lymphocytes Absolute 1 57 Thousands/µL      Monocytes Absolute 0 60 Thousand/µL      Eosinophils Absolute 0 62 Thousand/µL      Basophils Absolute 0 06 Thousands/µL           XR chest 1 view portable   Final Result by Shyla Dial MD (01/09 1422)      No infiltrates are seen            Workstation performed: [id number]             ED [facility]  ECG 12 Lead Documentation Only  Date/Time: 1/9/2020 12:00 PM  Performed by: Germán Reardon MD  Authorized by: Germán Reardon MD     Comments:      Normal sinus rhythm, ventricular rate 81, [place] interval grossly appears to be within normal limits,  consistent with right bundle branch block, , left axis deviation, no obvious ST/T-wave changes to suggest ischemia, no STEMI  Overall, EKG unchanged from prior dated 09/23/2019  Medications   cephalexin (KEFLEX) capsule 500 mg (500 mg Oral Given 1/9/20 8073)     17-year-old male presenting from his neurologist's office with report of hypotension and decreased mental status  Vital signs reviewed, within normal limits at present, afebrile  Patient placed on continuous ECG, SpO2, and intermittent BP monitoring    Differential diagnosis includes dehydration, as well as infection, such as urinary tract infection given indwelling Hoffmann catheter, pneumonia, GI source of infection, skin/soft tissue infection, medication side effect, versus another etiology of symptoms  Physical exam is remarkable only for a papular rash that is with excoriations but without surrounding erythema to suggest superinfection, as well as Hoffmann catheter with a lot of sediment and mucus  Chest x-ray to my review reveals low inspiratory volumes, but no obvious infiltrates  Formal read is as above  Labs reveal an unremarkable CBC with a mild anemia of 11 9, unremarkable CMP with baseline chronic kidney disease  Patient's Hoffmann catheter was exchanged  UA is with positive nitrites large number of leukocytes and blood, concerning for urinary tract infection  Hoffmann catheter replaced given that patient has had urinary retention in the past   He is to follow up with his urologist regarding the ongoing need for Hoffmann catheter  First dose of cephalexin administered in the emergency department  Patient was observed in the emergency department for 6 hours and had no episodes of hypotension  He remained hemodynamically stable  I discussed the case with the nursing home, including my recommendations for cephalexin  Cephalexin called in to nursing home pharmacy of choice  At this time, recommend watchful monitoring of patient's rash  He may benefit from minimally and applied topically  Patient will be seen by Dr Anitha Gama who will be rounding on patient at the nursing home in the coming week  Return to emergency department if hypotensive, if worsening confusion, it fevers, or new or worsening symptoms         MDM  Number of Diagnoses or Management Options  Hypotension: new and requires workup  Skin eruption: minor  UTI (urinary tract infection): new and does not require workup     Amount and/or Complexity of Data Reviewed  Clinical lab tests: ordered and reviewed  Tests in the radiology section of CPT®: ordered and reviewed  Obtain history from someone other than the patient: yes  Review and summarize past medical records: yes  Independent visualization of images, tracings, or specimens: yes    Risk of Complications, Morbidity, and/or Mortality  Presenting problems: high  Diagnostic procedures: moderate  Management options: low    Patient Progress  Patient progress: stable      CLINICAL IMPRESSION  Final diagnoses:   UTI (urinary tract infection)   Hypotension   Skin eruption       DISPOSITION  Time reflects when diagnosis was documented in both MDM as applicable and the Disposition within this note     Time User Action Codes Description Comment    1/9/2020  3:45 PM Catherin Pap Add [N39 0] UTI (urinary tract infection)     1/10/2020  2:47 AM Catherin Pap Add [I95 9] Hypotension     1/10/2020  2:47 AM Catherin Pap Add [R21] Skin eruption       ED Disposition     ED Disposition Condition Date/Time Comment    Discharge Stable Thu Jan 9, 2020  5:18 PM Concha Titus discharge to home/self care  Follow-up Information     Follow up With Specialties Details Why Contact Info Additional Information    Heena Osman MD Internal Medicine Schedule an appointment as soon as possible for a visit in 3 days Emergency Room Follow-up [address] [phone]       [address] [place] [address] Emergency Department Emergency Medicine Go to  As needed, If symptoms worsen [address] [place] [address], [address] [place] [address], [address] [phone]          This note has been generated using a voice recognition software  There may be typographic, grammatic, or word substitution errors that have escaped editorial review       Noelle Webber MD  01/10/20 2150

## 2020-01-12 LAB
BACTERIA UR CULT: ABNORMAL
BACTERIA UR CULT: ABNORMAL

## 2020-03-15 ENCOUNTER — APPOINTMENT (EMERGENCY)
Dept: RADIOLOGY | Facility: HOSPITAL | Age: 85
DRG: 698 | End: 2020-03-15
Payer: MEDICARE

## 2020-03-15 ENCOUNTER — APPOINTMENT (EMERGENCY)
Dept: CT IMAGING | Facility: HOSPITAL | Age: 85
DRG: 698 | End: 2020-03-15
Payer: MEDICARE

## 2020-03-15 ENCOUNTER — HOSPITAL ENCOUNTER (INPATIENT)
Facility: HOSPITAL | Age: 85
LOS: 3 days | Discharge: NURSING, REHAB OR LONG-TERM CARE | DRG: 698 | End: 2020-03-18
Attending: EMERGENCY MEDICINE | Admitting: ANESTHESIOLOGY
Payer: MEDICARE

## 2020-03-15 ENCOUNTER — APPOINTMENT (EMERGENCY)
Dept: ULTRASOUND IMAGING | Facility: HOSPITAL | Age: 85
DRG: 698 | End: 2020-03-15
Payer: MEDICARE

## 2020-03-15 DIAGNOSIS — N39.0 UTI (URINARY TRACT INFECTION): ICD-10-CM

## 2020-03-15 DIAGNOSIS — Z51.5 COMFORT MEASURES ONLY STATUS: ICD-10-CM

## 2020-03-15 DIAGNOSIS — R41.82 ALTERED MENTAL STATUS: Primary | ICD-10-CM

## 2020-03-15 DIAGNOSIS — T14.8XXA OPEN WOUND OF SKIN: ICD-10-CM

## 2020-03-15 PROBLEM — A41.9 SEPSIS (HCC): Status: ACTIVE | Noted: 2020-03-15

## 2020-03-15 PROBLEM — I60.9 SAH (SUBARACHNOID HEMORRHAGE) (HCC): Chronic | Status: ACTIVE | Noted: 2019-09-22

## 2020-03-15 PROBLEM — N18.30 CKD (CHRONIC KIDNEY DISEASE) STAGE 3, GFR 30-59 ML/MIN (HCC): Chronic | Status: ACTIVE | Noted: 2019-09-28

## 2020-03-15 LAB
ALBUMIN SERPL BCP-MCNC: 1.8 G/DL (ref 3.5–5)
ALP SERPL-CCNC: 131 U/L (ref 46–116)
ALT SERPL W P-5'-P-CCNC: 87 U/L (ref 12–78)
ANION GAP SERPL CALCULATED.3IONS-SCNC: 14 MMOL/L (ref 4–13)
ANION GAP SERPL CALCULATED.3IONS-SCNC: 17 MMOL/L (ref 4–13)
ANION GAP SERPL CALCULATED.3IONS-SCNC: 18 MMOL/L (ref 4–13)
ANISOCYTOSIS BLD QL SMEAR: PRESENT
ARTERIAL PATENCY WRIST A: YES
AST SERPL W P-5'-P-CCNC: 52 U/L (ref 5–45)
BACTERIA UR QL AUTO: ABNORMAL /HPF
BASE EXCESS BLDA CALC-SCNC: -8.7 MMOL/L
BASOPHILS # BLD MANUAL: 0 THOUSAND/UL (ref 0–0.1)
BASOPHILS NFR MAR MANUAL: 0 % (ref 0–1)
BILIRUB SERPL-MCNC: 0.55 MG/DL (ref 0.2–1)
BILIRUB UR QL STRIP: ABNORMAL
BUN SERPL-MCNC: 169 MG/DL (ref 5–25)
BUN SERPL-MCNC: 172 MG/DL (ref 5–25)
BUN SERPL-MCNC: 181 MG/DL (ref 5–25)
CALCIUM SERPL-MCNC: 7.7 MG/DL (ref 8.3–10.1)
CALCIUM SERPL-MCNC: 7.8 MG/DL (ref 8.3–10.1)
CALCIUM SERPL-MCNC: 8.1 MG/DL (ref 8.3–10.1)
CHLORIDE SERPL-SCNC: 108 MMOL/L (ref 100–108)
CHLORIDE SERPL-SCNC: 110 MMOL/L (ref 100–108)
CHLORIDE SERPL-SCNC: 112 MMOL/L (ref 100–108)
CLARITY UR: ABNORMAL
CO2 SERPL-SCNC: 16 MMOL/L (ref 21–32)
CO2 SERPL-SCNC: 18 MMOL/L (ref 21–32)
CO2 SERPL-SCNC: 20 MMOL/L (ref 21–32)
COLOR UR: YELLOW
CREAT SERPL-MCNC: 7.65 MG/DL (ref 0.6–1.3)
CREAT SERPL-MCNC: 7.78 MG/DL (ref 0.6–1.3)
CREAT SERPL-MCNC: 8.24 MG/DL (ref 0.6–1.3)
CREAT UR-MCNC: 31.7 MG/DL
DOHLE BOD BLD QL SMEAR: PRESENT
EOSINOPHIL # BLD MANUAL: 0 THOUSAND/UL (ref 0–0.4)
EOSINOPHIL NFR BLD MANUAL: 0 % (ref 0–6)
ERYTHROCYTE [DISTWIDTH] IN BLOOD BY AUTOMATED COUNT: 14.4 % (ref 11.6–15.1)
FLUAV RNA NPH QL NAA+PROBE: NORMAL
FLUBV RNA NPH QL NAA+PROBE: NORMAL
GFR SERPL CREATININE-BSD FRML MDRD: 5 ML/MIN/1.73SQ M
GLUCOSE SERPL-MCNC: 91 MG/DL (ref 65–140)
GLUCOSE SERPL-MCNC: 91 MG/DL (ref 65–140)
GLUCOSE SERPL-MCNC: 97 MG/DL (ref 65–140)
GLUCOSE UR STRIP-MCNC: NEGATIVE MG/DL
HCO3 BLDA-SCNC: 13.6 MMOL/L (ref 22–28)
HCT VFR BLD AUTO: 34.1 % (ref 36.5–49.3)
HGB BLD-MCNC: 11.3 G/DL (ref 12–17)
HGB UR QL STRIP.AUTO: ABNORMAL
KETONES UR STRIP-MCNC: NEGATIVE MG/DL
LACTATE SERPL-SCNC: 1.5 MMOL/L (ref 0.5–2)
LACTATE SERPL-SCNC: 2 MMOL/L (ref 0.5–2)
LEUKOCYTE ESTERASE UR QL STRIP: ABNORMAL
LYMPHOCYTES # BLD AUTO: 0.67 THOUSAND/UL (ref 0.6–4.47)
LYMPHOCYTES # BLD AUTO: 7 % (ref 14–44)
MCH RBC QN AUTO: 29.7 PG (ref 26.8–34.3)
MCHC RBC AUTO-ENTMCNC: 33.1 G/DL (ref 31.4–37.4)
MCV RBC AUTO: 90 FL (ref 82–98)
MONOCYTES # BLD AUTO: 1.54 THOUSAND/UL (ref 0–1.22)
MONOCYTES NFR BLD: 16 % (ref 4–12)
NASAL CANNULA: 2
NEUTROPHILS # BLD MANUAL: 7.39 THOUSAND/UL (ref 1.85–7.62)
NEUTS BAND NFR BLD MANUAL: 10 % (ref 0–8)
NEUTS SEG NFR BLD AUTO: 67 % (ref 43–75)
NITRITE UR QL STRIP: NEGATIVE
NON-SQ EPI CELLS URNS QL MICRO: ABNORMAL /HPF
NRBC BLD AUTO-RTO: 0 /100 WBCS
O2 CT BLDA-SCNC: 15.4 ML/DL (ref 16–23)
OXYHGB MFR BLDA: 97.4 % (ref 94–97)
PCO2 BLDA: 20.5 MM HG (ref 36–44)
PH BLDA: 7.44 [PH] (ref 7.35–7.45)
PH UR STRIP.AUTO: 8.5 [PH]
PLATELET # BLD AUTO: 149 THOUSANDS/UL (ref 149–390)
PLATELET # BLD AUTO: 174 THOUSANDS/UL (ref 149–390)
PLATELET BLD QL SMEAR: ADEQUATE
PMV BLD AUTO: 11.3 FL (ref 8.9–12.7)
PMV BLD AUTO: 11.4 FL (ref 8.9–12.7)
PO2 BLDA: 109.8 MM HG (ref 75–129)
POTASSIUM SERPL-SCNC: 5.3 MMOL/L (ref 3.5–5.3)
POTASSIUM SERPL-SCNC: 5.4 MMOL/L (ref 3.5–5.3)
POTASSIUM SERPL-SCNC: 5.6 MMOL/L (ref 3.5–5.3)
PROT SERPL-MCNC: 6.8 G/DL (ref 6.4–8.2)
PROT UR STRIP-MCNC: >=300 MG/DL
RBC # BLD AUTO: 3.81 MILLION/UL (ref 3.88–5.62)
RBC #/AREA URNS AUTO: ABNORMAL /HPF
RBC MORPH BLD: PRESENT
RSV RNA NPH QL NAA+PROBE: NORMAL
SODIUM 24H UR-SCNC: 98 MOL/L
SODIUM SERPL-SCNC: 143 MMOL/L (ref 136–145)
SODIUM SERPL-SCNC: 144 MMOL/L (ref 136–145)
SODIUM SERPL-SCNC: 146 MMOL/L (ref 136–145)
SP GR UR STRIP.AUTO: 1.01 (ref 1–1.03)
SPECIMEN SOURCE: ABNORMAL
TOTAL CELLS COUNTED SPEC: 100
TOXIC GRANULES BLD QL SMEAR: PRESENT
TROPONIN I SERPL-MCNC: 0.04 NG/ML
TROPONIN I SERPL-MCNC: 0.04 NG/ML
UROBILINOGEN UR QL STRIP.AUTO: 0.2 E.U./DL
UUN 24H UR-MCNC: 283 MG/DL
VALPROATE SERPL-MCNC: 18 UG/ML (ref 50–100)
WBC # BLD AUTO: 9.6 THOUSAND/UL (ref 4.31–10.16)
WBC #/AREA URNS AUTO: ABNORMAL /HPF

## 2020-03-15 PROCEDURE — 87631 RESP VIRUS 3-5 TARGETS: CPT | Performed by: PHYSICIAN ASSISTANT

## 2020-03-15 PROCEDURE — 87186 SC STD MICRODIL/AGAR DIL: CPT | Performed by: PHYSICIAN ASSISTANT

## 2020-03-15 PROCEDURE — 84300 ASSAY OF URINE SODIUM: CPT | Performed by: NURSE PRACTITIONER

## 2020-03-15 PROCEDURE — 76770 US EXAM ABDO BACK WALL COMP: CPT

## 2020-03-15 PROCEDURE — 80048 BASIC METABOLIC PNL TOTAL CA: CPT | Performed by: NURSE PRACTITIONER

## 2020-03-15 PROCEDURE — 96365 THER/PROPH/DIAG IV INF INIT: CPT

## 2020-03-15 PROCEDURE — 36600 WITHDRAWAL OF ARTERIAL BLOOD: CPT

## 2020-03-15 PROCEDURE — 99285 EMERGENCY DEPT VISIT HI MDM: CPT | Performed by: EMERGENCY MEDICINE

## 2020-03-15 PROCEDURE — 99232 SBSQ HOSP IP/OBS MODERATE 35: CPT | Performed by: UROLOGY

## 2020-03-15 PROCEDURE — 82570 ASSAY OF URINE CREATININE: CPT | Performed by: NURSE PRACTITIONER

## 2020-03-15 PROCEDURE — 87086 URINE CULTURE/COLONY COUNT: CPT | Performed by: PHYSICIAN ASSISTANT

## 2020-03-15 PROCEDURE — 70450 CT HEAD/BRAIN W/O DYE: CPT

## 2020-03-15 PROCEDURE — 80164 ASSAY DIPROPYLACETIC ACD TOT: CPT | Performed by: PHYSICIAN ASSISTANT

## 2020-03-15 PROCEDURE — 87040 BLOOD CULTURE FOR BACTERIA: CPT | Performed by: PHYSICIAN ASSISTANT

## 2020-03-15 PROCEDURE — 80053 COMPREHEN METABOLIC PANEL: CPT | Performed by: PHYSICIAN ASSISTANT

## 2020-03-15 PROCEDURE — 85007 BL SMEAR W/DIFF WBC COUNT: CPT | Performed by: PHYSICIAN ASSISTANT

## 2020-03-15 PROCEDURE — 81001 URINALYSIS AUTO W/SCOPE: CPT | Performed by: PHYSICIAN ASSISTANT

## 2020-03-15 PROCEDURE — 99223 1ST HOSP IP/OBS HIGH 75: CPT | Performed by: NURSE PRACTITIONER

## 2020-03-15 PROCEDURE — 74176 CT ABD & PELVIS W/O CONTRAST: CPT

## 2020-03-15 PROCEDURE — NC001 PR NO CHARGE: Performed by: NURSE PRACTITIONER

## 2020-03-15 PROCEDURE — 83605 ASSAY OF LACTIC ACID: CPT | Performed by: PHYSICIAN ASSISTANT

## 2020-03-15 PROCEDURE — 99285 EMERGENCY DEPT VISIT HI MDM: CPT

## 2020-03-15 PROCEDURE — 85027 COMPLETE CBC AUTOMATED: CPT | Performed by: PHYSICIAN ASSISTANT

## 2020-03-15 PROCEDURE — 96361 HYDRATE IV INFUSION ADD-ON: CPT

## 2020-03-15 PROCEDURE — 99291 CRITICAL CARE FIRST HOUR: CPT | Performed by: PHYSICIAN ASSISTANT

## 2020-03-15 PROCEDURE — 84540 ASSAY OF URINE/UREA-N: CPT | Performed by: NURSE PRACTITIONER

## 2020-03-15 PROCEDURE — 87147 CULTURE TYPE IMMUNOLOGIC: CPT | Performed by: PHYSICIAN ASSISTANT

## 2020-03-15 PROCEDURE — 0T2BX0Z CHANGE DRAINAGE DEVICE IN BLADDER, EXTERNAL APPROACH: ICD-10-PCS | Performed by: EMERGENCY MEDICINE

## 2020-03-15 PROCEDURE — 82805 BLOOD GASES W/O2 SATURATION: CPT | Performed by: NURSE PRACTITIONER

## 2020-03-15 PROCEDURE — 80048 BASIC METABOLIC PNL TOTAL CA: CPT | Performed by: PHYSICIAN ASSISTANT

## 2020-03-15 PROCEDURE — 93005 ELECTROCARDIOGRAM TRACING: CPT

## 2020-03-15 PROCEDURE — 36415 COLL VENOUS BLD VENIPUNCTURE: CPT | Performed by: PHYSICIAN ASSISTANT

## 2020-03-15 PROCEDURE — 71045 X-RAY EXAM CHEST 1 VIEW: CPT

## 2020-03-15 PROCEDURE — 84484 ASSAY OF TROPONIN QUANT: CPT | Performed by: PHYSICIAN ASSISTANT

## 2020-03-15 PROCEDURE — 87077 CULTURE AEROBIC IDENTIFY: CPT | Performed by: PHYSICIAN ASSISTANT

## 2020-03-15 PROCEDURE — 85049 AUTOMATED PLATELET COUNT: CPT | Performed by: PHYSICIAN ASSISTANT

## 2020-03-15 RX ORDER — NYSTATIN 100000 [USP'U]/G
POWDER TOPICAL 2 TIMES DAILY
Status: DISCONTINUED | OUTPATIENT
Start: 2020-03-15 | End: 2020-03-18

## 2020-03-15 RX ORDER — DOCUSATE SODIUM 100 MG/1
100 CAPSULE, LIQUID FILLED ORAL 2 TIMES DAILY
Status: DISCONTINUED | OUTPATIENT
Start: 2020-03-15 | End: 2020-03-16

## 2020-03-15 RX ORDER — CEFTRIAXONE 1 G/50ML
1000 INJECTION, SOLUTION INTRAVENOUS EVERY 24 HOURS
Status: DISCONTINUED | OUTPATIENT
Start: 2020-03-16 | End: 2020-03-16

## 2020-03-15 RX ORDER — CHLORHEXIDINE GLUCONATE 0.12 MG/ML
15 RINSE ORAL EVERY 12 HOURS SCHEDULED
Status: DISCONTINUED | OUTPATIENT
Start: 2020-03-15 | End: 2020-03-15

## 2020-03-15 RX ORDER — AMLODIPINE BESYLATE 5 MG/1
5 TABLET ORAL DAILY
Status: DISCONTINUED | OUTPATIENT
Start: 2020-03-16 | End: 2020-03-16

## 2020-03-15 RX ORDER — DIVALPROEX SODIUM 125 MG/1
500 CAPSULE, COATED PELLETS ORAL DAILY
Status: DISCONTINUED | OUTPATIENT
Start: 2020-03-16 | End: 2020-03-16

## 2020-03-15 RX ORDER — CEFTRIAXONE 1 G/50ML
1000 INJECTION, SOLUTION INTRAVENOUS ONCE
Status: COMPLETED | OUTPATIENT
Start: 2020-03-15 | End: 2020-03-15

## 2020-03-15 RX ORDER — HEPARIN SODIUM 5000 [USP'U]/ML
5000 INJECTION, SOLUTION INTRAVENOUS; SUBCUTANEOUS EVERY 8 HOURS SCHEDULED
Status: DISCONTINUED | OUTPATIENT
Start: 2020-03-15 | End: 2020-03-18

## 2020-03-15 RX ORDER — PROMETHAZINE HYDROCHLORIDE 25 MG/1
25 TABLET ORAL EVERY 6 HOURS PRN
COMMUNITY
End: 2020-03-18 | Stop reason: HOSPADM

## 2020-03-15 RX ADMIN — SODIUM BICARBONATE 100 ML/HR: 84 INJECTION, SOLUTION INTRAVENOUS at 16:40

## 2020-03-15 RX ADMIN — SODIUM CHLORIDE 1000 ML: 0.9 INJECTION, SOLUTION INTRAVENOUS at 13:11

## 2020-03-15 RX ADMIN — NYSTATIN: 100000 POWDER TOPICAL at 21:06

## 2020-03-15 RX ADMIN — VANCOMYCIN HYDROCHLORIDE 1750 MG: 5 INJECTION, POWDER, LYOPHILIZED, FOR SOLUTION INTRAVENOUS at 18:22

## 2020-03-15 RX ADMIN — HEPARIN SODIUM 5000 UNITS: 5000 INJECTION INTRAVENOUS; SUBCUTANEOUS at 21:06

## 2020-03-15 RX ADMIN — SODIUM CHLORIDE 1000 ML: 0.9 INJECTION, SOLUTION INTRAVENOUS at 12:34

## 2020-03-15 RX ADMIN — CEFTRIAXONE 1000 MG: 1 INJECTION, SOLUTION INTRAVENOUS at 13:14

## 2020-03-15 RX ADMIN — SODIUM CHLORIDE 1000 ML: 0.9 INJECTION, SOLUTION INTRAVENOUS at 11:10

## 2020-03-15 RX ADMIN — VALPROATE SODIUM 1000 MG: 100 INJECTION, SOLUTION INTRAVENOUS at 20:29

## 2020-03-15 NOTE — H&P
H&P- Anu Gan 1/30/1924, 80 y o  male MRN: [id number]    Unit/Bed#: ED 03 Encounter: [id number]    Primary Care Provider: Amisha Comer MD   Date and time admitted to hospital: 3/15/2020 10:47 AM    Urinary retention  Assessment & Plan  · Patient has history of urinary retention, frequent UTIs, and multiple barrera catheters placed  · Cont flomax q hs  · Cont to monitor UOP q 1 hour    FRANCOIS on CKD (chronic kidney disease) stage 3, GFR 30-59 ml/min ([facility])  Assessment & Plan  · Baseline Cr appears to be 1 2-1 3  · Barrera was not draining in ER and was replaced with 1 liter output  · CT A/P shows "moderate right and moderate to severe left hydroureteronephrosis identified without distal obstructive pathology identified   There is left perinephric stranding as well as stranding in the region of the left renal pelvis and proximal   Ureter "   · Cont to trend Cr now that obstructive uropathy resolved   · Urology spoke with ER team and did not recommend intervention for hydroureteronephrosis  · Nephrology consulted and following  · Not recommending HD due to age and comorbities    Encephalopathy  Assessment & Plan  · Likely due to toxic metabolic causes like UTI and uremia  · Patient aphasic at baseline  · [facility] negative for acute process on admission  · Cont to monitor and neurochecks    H/O [facility] (subarachnoid hemorrhage) and Seizures 9/2019  Assessment & Plan  · Patient was admitted to [facility] in 9/2019 with seizures and SAH s/p fall  · Patient remains aphasic at baseline  · Patient was started and maintained on Depakote  mg daily  · Check valproic acid level    A-fib ([facility] [address] )  Assessment & Plan  · Patient currently in sinus rhythm  · Not a candidate for [facility] per Cardiology notes as outpatient    Coronary artery disease involving native coronary artery of native heart without angina pectoris  Assessment & Plan  · Patient has bare metal stent placed 2012 and was seeing outpatient Cardiology  · No longer taking ASA per records  · Last echo 65% with grade 1 diastolic dysfunction    Essential hypertension  Assessment & Plan  · Patient takes norvasc 5 mg daily at home  · Cont and monitor    Mixed hyperlipidemia  Assessment & Plan  · Patient refuses statin    * Sepsis secondary to UTI  Assessment & Plan  · Patient had MRSA UTI in 10/2019 and Serratia UTI 1/2020  · Received 3 L NSS in ER, cultures and lactate sent, will trend  · Patient started on Rocephin, but will add Vanco secondary to H/O MRSA  · Monitor closely      -------------------------------------------------------------------------------------------------------------  Chief Complaint: AMS    History of Present Illness   HX and PE limited by: Anne Mejia is a 80 y o  male with past medical history of hypertension, multiple UTIs, urinary retention with chronic barrera, atrial fibrillation, seizures with subarachnoid hemorrhage September of 2019, and aphasia who presents from skilled nursing facility with increased lethargy and Barrera catheter malfunction  The nursing staff noted that the patient's Barrera was not draining and he had recently been diagnosed with the UTI  In the emergency department a bladder scan was done revealing 1 L of urine and a new urinary catheter was placed, immediately draining 1 8 L of purulence drainage  His initial labs showed a creatinine of 8 24, , bicarb 18, anion gap of 17, and potassium of 5 6  He ultimately received approximately 3 L of IV fluid and upon insertion of new catheter 2 6 L urine output  An ultrasound was done showing moderate left hydronephrosis with debris and urology was contacted by the emergency room provider  They recommended continuing fluids flushing Barrera as needed and obtaining a noncontrast CT  The CT showed a left greater than right hydroureteronephrosis without of distal obstructive pathology    There was also left perinephric and periureteral stranding consistent with possible underlying infection  Patient was started on Rocephin IV by emergency department and repeat labs showed creatinine of 7 78,   Patient has history of CKD with apparent baseline of 1 5-1 9  Nephrology contacted the patient's son and ultimately was determined patient not a candidate for hemodialysis and the son agreed his father would not want dialysis at this point  The critical care team was called for admission  Vancomycin was added to antibiotic regimen due to having a history of a MRSA UTI in October of 2019  The patient also had a UTI in January of 2020 that grew Serratia susceptible to Rocephin  The son clarified that the patient is a level 3 code status which is DNR DNI  History obtained from chart review  -------------------------------------------------------------------------------------------------------------  Dispo: Admit to Stepdown Level 1    Code Status: Prior  --------------------------------------------------------------------------------------------------------------  Review of Systems    Review of systems was unable to be performed secondary to AMS    Physical Exam   Constitutional: He appears toxic  He appears ill  HENT:   Head: Normocephalic and atraumatic  Eyes: Pupils are equal, round, and reactive to light  Conjunctivae are normal    Neck: No tracheal deviation present  Cardiovascular: Normal rate, regular rhythm and normal heart sounds  Pulmonary/Chest: Effort normal and breath sounds normal  No respiratory distress  He has no wheezes  He has no rales  Abdominal: Soft  Bowel sounds are normal  He exhibits no distension  There is no tenderness  There is no guarding  Musculoskeletal: He exhibits edema (+3 pitting edema BLE)  Neurological: GCS eye subscore is 1  GCS verbal subscore is 1  GCS motor subscore is 4     Follows commands to squeeze hands bilateral- does not follow any other commands   Nursing note and vitals reviewed  --------------------------------------------------------------------------------------------------------------  Vitals:   Vitals:    03/15/20 1545 03/15/20 1615 03/15/20 1630 03/15/20 1700   BP:  140/62 110/63 154/89   Pulse: 93 93 98 98   Resp: 20 20 18 20   Temp:       TempSrc:       SpO2: 97% 98% 99% 97%   Weight:         Temp  Min: 98 2 °F (36 8 °C)  Max: 98 2 °F (36 8 °C)  IBW: -88 kg     Body mass index is 28 13 kg/m²  Laboratory and Diagnostics:  Results from last 7 days   Lab Units 03/15/20  1103   WBC Thousand/uL 9 60   HEMOGLOBIN g/dL 11 3*   HEMATOCRIT % 34 1*   PLATELETS Thousands/uL 174   BANDS PCT % 10*   MONO PCT % 16*     Results from last 7 days   Lab Units 03/15/20  1518 03/15/20  1103   SODIUM mmol/L 144 143   POTASSIUM mmol/L 5 4* 5 6*   CHLORIDE mmol/L 110* 108   CO2 mmol/L 16* 18*   ANION GAP mmol/L 18* 17*   BUN mg/dL 169* 181*   CREATININE mg/dL 7 78* 8 24*   CALCIUM mg/dL 7 8* 8 1*   GLUCOSE RANDOM mg/dL 91 97   ALT U/L  --  87*   AST U/L  --  52*   ALK PHOS U/L  --  131*   ALBUMIN g/dL  --  1 8*   TOTAL BILIRUBIN mg/dL  --  0 55               Results from last 7 days   Lab Units 03/15/20  1518 03/15/20  1103   TROPONIN I ng/mL 0 04 0 04     Results from last 7 days   Lab Units 03/15/20  1103   LACTIC ACID mmol/L 2 0     ABG:  Results from last 7 days   Lab Units 03/15/20  1555   PH ART  7 439   PCO2 ART mm Hg 20 5*   PO2 ART mm Hg 109 8   HCO3 ART mmol/L 13 6*   BASE EXC ART mmol/L -8 7   ABG SOURCE  Radial, Left     VBG:  Results from last 7 days   Lab Units 03/15/20  1555   ABG SOURCE  Radial, Left           Micro:  Results from last 7 days   Lab Units 03/15/20  1119 03/15/20  1103   BLOOD CULTURE  Received in Microbiology Lab  Culture in Progress  Received in Microbiology Lab  Culture in Progress  EKG: Reviewed by ER  Imaging: I have personally reviewed pertinent reports          Historical Information   Past Medical History:   Diagnosis Date    Anxiety     Arthritis     Athscl heart disease of native coronary artery w/o ang pctrs     Coronary angioplasty status     HTN (hypertension)     Hx of echocardiogram 04/06/2012    EF 0 65, Normal LV function   Hyperlipidemia     Neurogenic bladder     Renal failure (ARF), acute on chronic (HCC)     Renal failure (ARF), acute on chronic (HCC)     Right bundle branch block     Ventricular premature depolarization      Past Surgical History:   Procedure Laterality Date    CARDIAC CATHETERIZATION  04/04/2012    Single vessel CAD; Successful bare metal stent placed in the distal RCA     Social History   Social History     Substance and Sexual Activity   Alcohol Use Not Currently     Social History     Substance and Sexual Activity   Drug Use Never     Social History     Tobacco Use   Smoking Status Never Smoker   Smokeless Tobacco Never Used     Exercise History: None  Family History:   Family History   Problem Relation Age of Onset    No Known Problems Family         noncontributory     I have reviewed this patient's family history and commented on sigificant items within the HPI      Medications:  No current facility-administered medications for this encounter  Home medications:  Prior to Admission Medications   Prescriptions Last Dose Informant Patient Reported? Taking?    amLODIPine (NORVASC) 5 mg tablet   No Yes   Sig: Take 1 tablet (5 mg total) by mouth daily   divalproex sodium (DEPAKOTE SPRINKLE) 125 MG capsule   No Yes   Sig: Take 4 capsules (500 mg total) by mouth daily   docusate sodium (COLACE) 100 mg capsule   No Yes   Sig: Take 1 capsule (100 mg total) by mouth 2 (two) times a day   gabapentin (NEURONTIN) 100 mg capsule   No Yes   Sig: Take 1 capsule (100 mg total) by mouth daily at bedtime   nystatin (MYCOSTATIN) powder   No Yes   Sig: Apply topically 2 (two) times a day   promethazine (PHENERGAN) 25 mg tablet   Yes Yes   Sig: Take 25 mg by mouth every 6 (six) hours as needed for nausea or vomiting Facility-Administered Medications: None     Allergies: Allergies   Allergen Reactions    Sulfa Antibiotics        ------------------------------------------------------------------------------------------------------------  Advance Directive and Living Will:      Power of : Yes  POLST:    ------------------------------------------------------------------------------------------------------------  Anticipated Length of Stay is > 2 midnights    Care Time Delivered:   Upon my evaluation, this patient had a high probability of imminent or life-threatening deterioration due to 39, which required my direct attention, intervention, and personal management  I have personally provided 45 minutes ([phone] of critical care time, exclusive of procedures, teaching, family meetings, and any prior time recorded by providers other than myself  Bhupinder Durham PA-C        Portions of the record may have been created with voice recognition software  Occasional wrong word or "sound a like" substitutions may have occurred due to the inherent limitations of voice recognition software    Read the chart carefully and recognize, using context, where substitutions have occurred

## 2020-03-15 NOTE — ASSESSMENT & PLAN NOTE
· Likely due to toxic metabolic causes like UTI and uremia  · Patient aphasic at baseline  · [facility] negative for acute process on admission  · Cont to monitor and neurochecks

## 2020-03-15 NOTE — SEPSIS NOTE
Sepsis Note   Deng Maxwell 80 y o  male MRN: [id number]  Unit/Bed#: ED 03 Encounter: [id number]      qSOFA     Row Name 03/15/20 1700 03/15/20 1630 03/15/20 1615 03/15/20 1545 03/15/20 1530    Altered mental status GCS < 15              Respiratory Rate > / =22  0  0  0  0      Systolic BP < / =495  0  0  0    0    Q Sofa Score  0  0  0  0  0    Row Name 03/15/20 1515 03/15/20 1430 03/15/20 1415 03/15/20 1400 03/15/20 1330    Altered mental status GCS < 15              Respiratory Rate > / =22      0        Systolic BP < / =300  0  0  0  0  0    Q Sofa Score  0  1  1  1  1    Row Name 03/15/20 1327 03/15/20 1315 03/15/20 1230 03/15/20 1130 03/15/20 1115    Altered mental status GCS < 15              Respiratory Rate > / =22  0      1  1    Systolic BP < / =069  0  0  0  0      Q Sofa Score  1  2  2  2  2    Row Name 03/15/20 1107 03/15/20 1059             Altered mental status GCS < 15  1         Respiratory Rate > / =22    0       Systolic BP < / =617    0       Q Sofa Score  1  0                    Default Flowsheet Data (last 720 hours)      Sepsis Reassess     Row Name 03/15/20 1734                   Repeat Volume Status and Tissue Perfusion Assessment Performed    Repeat Volume Status and Tissue Perfusion Assessment Performed  Yes  -KS           Volume Status and Tissue Perfusion Post Fluid Resuscitation * Must Document All *    Vital Signs Reviewed (HR, RR, BP, T)  Yes  -KS        Shock Index Reviewed  Yes  -KS        Arterial Oxygen Saturation Reviewed (POx, SaO2 or SpO2)  Yes (comment %) 100%  -KS        Cardio  Normal S1/S2; Regular rate and rhythm; No murmor  -KS        Pulmonary  Normal effort;Clear to auscultation  -KS        Capillary Refill  Brisk  -KS        Peripheral Pulses  Radial;Dorsalis Pedis  -KS        Peripheral Pulse  +3  -KS        Dorsalis Pedis  +3  -KS        Skin  Warm;Dry  -KS        Urine output assessed  Adequate  -KS           *OR*   Intensive Monitoring- Must Document One of the Following Four *:    Vital Signs Reviewed  Yes  -KS        * Central Venous Pressure (CVP or RAP)          * Central Venous Oxygen (SVO2, ScvO2 or Oxygen saturation via central catheter)          * Bedside Cardiovascular US in IVC diameter and % collapse          * Passive Leg Raise OR Crystalloid Challenge  Crystalloid fluid challenge completed  -KS        Crystalloid fluid challenge completed  500mL in 15 minutes  -KS          User Key  (r) = Recorded By, (t) = Taken By, (c) = Cosigned By    Initials Name Provider Type    KS Yaquelin Pérez PA-C Physician Assistant       Lactate 2  Blood and urine cultures sent prior to abx

## 2020-03-15 NOTE — ASSESSMENT & PLAN NOTE
· Patient currently in sinus rhythm  · Not a candidate for [facility] per Cardiology notes as outpatient

## 2020-03-15 NOTE — ASSESSMENT & PLAN NOTE
· Patient has bare metal stent placed 2012 and was seeing outpatient Cardiology  · No longer taking ASA per records  · Last echo 65% with grade 1 diastolic dysfunction

## 2020-03-15 NOTE — ASSESSMENT & PLAN NOTE
· Patient had MRSA UTI in 10/2019 and Serratia UTI 1/2020  · Received 3 L NSS in ER, cultures and lactate sent, will trend  · Patient started on Rocephin, but will add Vanco secondary to H/O MRSA  · Monitor closely

## 2020-03-15 NOTE — ASSESSMENT & PLAN NOTE
· Patient was [facility] with seizures and SAH s/p fall  · Patient remains aphasic at baseline  · Patient was started and maintained on Depakote  mg daily  · Check valproic acid level

## 2020-03-15 NOTE — ED ATTENDING ATTESTATION
3/15/2020  I, Abhijeet Colindres MD, saw and evaluated the patient  I have discussed the patient with the resident/non-physician practitioner and agree with the resident's/non-physician practitioner's findings, Plan of Care, and MDM as documented in the resident's/non-physician practitioner's note, except where noted  All available labs and Radiology studies were reviewed  I was present for key portions of any procedure(s) performed by the resident/non-physician practitioner and I was immediately available to provide assistance  At this point I agree with the current assessment done in the Emergency Department  I have conducted an independent evaluation of this patient a history and physical is as follows:    ED Course     Patient recently diagnosed with the UTI  Has a chronic indwelling Hoffmann  From the nursing home because of increasing lethargy  On exam the patient appears dehydrated  Oropharynx is dry  Does respond to tactile stimuli  Lungs are clear to auscultation  Heart is slightly irregular  Abdomen is soft and nondistended  Nontender palpation  Bowel sounds are present  Patient has indwelling Hoffmann catheter  A bladder scan was done which showed over a L of urine in the bladder  The patient's elevated creatinine could be caused by a a post obstructive reason will have the Hoffmann catheter change      Critical Care Time  Procedures

## 2020-03-15 NOTE — PROGRESS NOTES
Merlin Aj 80 y o  male MRN: [id number]  Unit/Bed#: ED 03 Encounter: [id number]        Assessment and Plan:    1  Acute kidney injury:  Present on admission  · Upon review the medical record, baseline creatinine appears to be around 1 5-1 9 mg/dL  · Creatinine upon admission 8 24 mg/dL -> 7 78 mg/dL status post catheter exchange and IVF resuscitation with crystalloid  · Etiology likely multifactorial:  Patient decreasing intake prior to admission, concern for underlying infection of urinary source and possible transition to ATN  Also, post renal component with nonfunctional indwelling catheter which was exchanged in ER and is now functional, draining purulent  Renal ultrasound done showed moderate left hydro nephrosis and probable hemorrhage within the bladder  Urine chemistries are pending  · CT abdomen pelvis noncontrast is pending  · He is nonoliguric and has put out 2 6 L since urinary catheter exchange  · I personally spoke at length with patient's son and he does not feel his father would want dialysis  I also explained to patient's son Jet Anaya [phone]) that the patient's age and comorbidities put him at additional risk making him a poor candidate for hemodialysis  He is agreeable and understands  · Avoid nephrotoxins  · Trend BMPs frequently, strict I&O, daily weight, adjust meds to EGFR  2  Stage 3 chronic kidney disease  · Baseline as above  3  Metabolic acidosis with respiratory alkalosis  · AB 43/20/109/13 6  · Will give 1 L sodium bicarb 75 mEq and half-normal saline  · Monitor closely  4  Hyperkalemia  · Due to renal failure and metabolic acidosis  · Correcting slowly  Monitor closely  5  Moderate left-sided hydronephrosis  · Found on ultrasound  · Urinary catheter replaced, now patent and draining  6  Chronic urinary retention with chronic indwelling Hoffmann catheter  · Does follow urology as an outpatient  · Catheter exchanged in ED  7  Encephalopathy  · Likely uremia   mg/dL on presentation now 169 mg/dL  Slightly improving with IV fluids -trend  8  Concern for urosepsis  · Now with purulent drainage from urinary catheter  Received ceftriaxone  Cultures pending  · CT abdomen pelvis without contrast pending      HPI:    Jeanine Joseph is a 80 y o  male with an active problem list significant for CKD 3, urinary retention with chronic Hoffmann catheter who resides at [facility] and was recently diagnosed with the tract infection  He was sent to the ER for increasing lethargy  Upon arrival to ER bladder scan revealed 999 mL of urine  A new urinary catheter was placed and drained 1 8 L of purulent drainage  Initial labs indicated creatinine of 8 24, , potassium 5 6, bicarb 18, anion gap of 17  He was started on IV fluids  An ultrasound kidney bladder was done and showed moderate left-sided hydronephrosis, debris and probable hemorrhage within urinary bladder  Urology was contacted by ER provider and recommended continuing fluids, flushing Hoffmann as needed, obtaining noncontrast CT scan  Repeat BMP done and mild improvement of kidney function noted, BUN now 170, however worsening metabolic acidosis  ABG done to rule out other etiology 9 57/72/573/22 6, metabolic acidosis with resulting respiratory alkalosis  He has been started on sodium bicarb, 75 mEq and half normal saline at 100 mL an hour x1 bag  He will be admitted to Critical Care service    The patient was not able to provide any history due to encephalopathy  I spoke with the patient's son, Sondra Barnes [phone], in detail about the patient's condition and the implications there of  We discussed goals of care at length  Per patient's son, the patient would not want to be intubated or have CPR  We discussed hemodialysis at length and the patient's son does not feel that his father would want to undergo this type of treatment    I have advised him that due to his father's comorbidity and age, hemodialysis may be not be an appropriate intervention  The medical record, including Care Everywhere and media tabs were reviewed  Reason for Consult:  Acute kidney injury, electrolyte abnormalities, uremia    Review of Systems:  Unable to obtain due to encephalopathy      Historical Information   Past Medical History:   Diagnosis Date    Anxiety     Arthritis     Athscl heart disease of native coronary artery w/o ang pctrs     Coronary angioplasty status     HTN (hypertension)     Hx of echocardiogram 04/06/2012    EF 0 65, Normal LV function   Hyperlipidemia     Neurogenic bladder     Renal failure (ARF), acute on chronic (HCC)     Renal failure (ARF), acute on chronic (HCC)     Right bundle branch block     Ventricular premature depolarization      Past Surgical History:   Procedure Laterality Date    CARDIAC CATHETERIZATION  04/04/2012    Single vessel CAD; Successful bare metal stent placed in the distal RCA     Social History   Social History     Substance and Sexual Activity   Alcohol Use Not Currently     Social History     Substance and Sexual Activity   Drug Use Never     Social History     Tobacco Use   Smoking Status Never Smoker   Smokeless Tobacco Never Used       Family History:   Family History   Problem Relation Age of Onset    No Known Problems Family         noncontributory       Medications:  Pertinent medications were reviewed        Allergies   Allergen Reactions    Sulfa Antibiotics          Vitals:   /63   Pulse 93   Temp 98 2 °F (36 8 °C) (Temporal)   Resp 20   Wt 86 4 kg (190 lb 7 6 oz)   SpO2 97%   BMI 28 13 kg/m²   Body mass index is 28 13 kg/m²    SpO2: 97 %,   SpO2 Activity: At Rest,   O2 Device: Nasal cannula      Intake/Output Summary (Last 24 hours) at 3/15/2020 1614  Last data filed at 3/15/2020 1428  Gross per 24 hour   Intake 3050 ml   Output 2400 ml   Net 650 ml     Invasive Devices     Peripheral Intravenous Line Peripheral IV 03/15/20 Left Forearm less than 1 day          Drain            Urethral Catheter Non-latex 66 days    Urethral Catheter Latex less than 1 day                Physical Exam:  General: acutely ill appearing male   Eyes: conjunctivae pink, anicteric sclerae  ENT: lips and mucous membranes very dry  Neck: supple, no JVD, no masses  Chest: diminished breath sounds bilateral, no crackles, ronchus or wheezings  CVS: S1 & S2, normal rate, regular rhythm  Abdomen: soft, non-tender, non-distended, normoactive bowel sounds  Extremities: no edema of both legs  Skin: no rash  Neuro: encephalopathic       Diagnostic Data:  Lab: I have personally reviewed pertinent lab results  ,   CBC:  Results from last 7 days   Lab Units 03/15/20  1103   WBC Thousand/uL 9 60   HEMOGLOBIN g/dL 11 3*   HEMATOCRIT % 34 1*   PLATELETS Thousands/uL 174      CMP:   Lab Results   Component Value Date    SODIUM 144 03/15/2020    K 5 4 (H) 03/15/2020     (H) 03/15/2020    CO2 16 (L) 03/15/2020     (H) 03/15/2020    CREATININE 7 78 (H) 03/15/2020    CALCIUM 7 8 (L) 03/15/2020    AST 52 (H) 03/15/2020    ALT 87 (H) 03/15/2020    ALKPHOS 131 (H) 03/15/2020    EGFR 5 03/15/2020   ,   PT/INR: No results found for: PT, INR,   Magnesium: No components found for: MAG,  Phosphorous: No results found for: PHOS    Microbiology:  @LABRCNTIP,(urinecx:7)@        [facility]    Portions of the record may have been created with voice recognition software  Occasional wrong word or "sound a like" substitutions may have occurred due to the inherent limitations of voice recognition software  Read the chart carefully and recognize, using context, where substitutions have occurred

## 2020-03-15 NOTE — ED NOTES
Nursing Supervisor from [facility] aware of patients admission status and admitting diagnosis      Owen Duncan, MALINDA  03/15/20 8734

## 2020-03-15 NOTE — ASSESSMENT & PLAN NOTE
· Baseline Cr appears to be 1 2-1 3  · Hoffmann was not draining in ER and was replaced with 1 liter output  · CT A/P shows "moderate right and moderate to severe left hydroureteronephrosis identified without distal obstructive pathology identified   There is left perinephric stranding as well as stranding in the region of the left renal pelvis and proximal   Ureter "   · Cont to trend Cr now that obstructive uropathy resolved   · Urology spoke with ER team and did not recommend intervention for hydroureteronephrosis  · Nephrology consulted and following  · Not recommending HD due to age and comorbities

## 2020-03-15 NOTE — ED PROVIDER NOTES
History  Chief Complaint   Patient presents with    Possible UTI     per tommy bee pt wears home O2 @2 5 at all times and was recently DX with UTI   staff reports pt is too sleepy to administer medications and is concerned about his mental status   Lethargy     80year-old presented to the emergency department via BLS from [facility] for evaluation of lethargy  Patient is alert to painful stimuli and voice however is not talking at this time, limiting history  According to EMS patient was recently treated for UTI  Chronic indwelling Hoffmann  Per EMS, staff reports patient is too sleepy to administer medication  According to EMS patient had a low grade fever at Nursing home however is afebrile now  Of note: patient on 2 5L O2 NC at all times  Prior to Admission Medications   Prescriptions Last Dose Informant Patient Reported? Taking? amLODIPine (NORVASC) 5 mg tablet   No Yes   Sig: Take 1 tablet (5 mg total) by mouth daily   divalproex sodium (DEPAKOTE SPRINKLE) 125 MG capsule   No Yes   Sig: Take 4 capsules (500 mg total) by mouth daily   docusate sodium (COLACE) 100 mg capsule   No Yes   Sig: Take 1 capsule (100 mg total) by mouth 2 (two) times a day   gabapentin (NEURONTIN) 100 mg capsule   No Yes   Sig: Take 1 capsule (100 mg total) by mouth daily at bedtime   nystatin (MYCOSTATIN) powder   No Yes   Sig: Apply topically 2 (two) times a day   promethazine (PHENERGAN) 25 mg tablet   Yes Yes   Sig: Take 25 mg by mouth every 6 (six) hours as needed for nausea or vomiting      Facility-Administered Medications: None       Past Medical History:   Diagnosis Date    Anxiety     Arthritis     Athscl heart disease of native coronary artery w/o ang pctrs     Coronary angioplasty status     HTN (hypertension)     Hx of echocardiogram 04/06/2012    EF 0 65, Normal LV function      Hyperlipidemia     Neurogenic bladder     Renal failure (ARF), acute on chronic (HCC)     Renal failure (ARF), acute on chronic ([facility] [address] )     Right bundle branch block     Ventricular premature depolarization        Past Surgical History:   Procedure Laterality Date    CARDIAC CATHETERIZATION  04/04/2012    Single vessel CAD; Successful bare metal stent placed in the distal RCA       Family History   Problem Relation Age of Onset    No Known Problems Family         noncontributory     I have reviewed and agree with the history as documented  E-Cigarette/Vaping    E-Cigarette Use Never User      E-Cigarette/Vaping Substances     Social History     Tobacco Use    Smoking status: Never Smoker    Smokeless tobacco: Never Used   Substance Use Topics    Alcohol use: Not Currently    Drug use: Never       Review of Systems   Unable to perform ROS: Other (Lethargy)       Physical Exam  Physical Exam   Constitutional: He appears well-developed and well-nourished  He appears lethargic  He is sleeping  Chronically ill-appearing   HENT:   Head: Normocephalic and atraumatic  Mouth/Throat: Uvula is midline  Mucous membranes are dry  Dental caries present  Eyes: Pupils are equal, round, and reactive to light  Conjunctivae and EOM are normal    Pt squeezes eyes tight but would not open eyes  PERRL when I opened eyes   Cardiovascular: Normal rate and regular rhythm  No murmur heard  Pulmonary/Chest: Effort normal  He has decreased breath sounds  Abdominal: Soft  Bowel sounds are normal  He exhibits no distension  Neurological: He appears lethargic  He displays normal reflexes  Skin: Skin is warm  Capillary refill takes less than 2 seconds  No rash noted  Nursing note and vitals reviewed        Vital Signs  ED Triage Vitals   Temperature Pulse Respirations Blood Pressure SpO2   03/15/20 1059 03/15/20 1059 03/15/20 1059 03/15/20 1059 03/15/20 1059   98 2 °F (36 8 °C) 104 18 137/76 98 %      Temp Source Heart Rate Source Patient Position - Orthostatic VS BP Location FiO2 (%)   03/15/20 1059 03/15/20 1059 03/15/20 1615 03/15/20 1930 --   Temporal Monitor Lying Left arm       Pain Score       03/15/20 1059       No Pain           Vitals:    03/15/20 1830 03/15/20 1845 03/15/20 1900 03/15/20 1930   BP: 131/84  123/97    Pulse: 92 82 104    Patient Position - Orthostatic VS:    Lying         Visual Acuity      ED Medications  Medications   vancomycin (VANCOCIN) 1,750 mg in sodium chloride 0 9 % 500 mL IVPB (1,750 mg Intravenous New Bag 3/15/20 1822)   cefTRIAXone (ROCEPHIN) IVPB (premix) 1,000 mg (has no administration in time range)   amLODIPine (NORVASC) tablet 5 mg (has no administration in time range)   divalproex sodium (DEPAKOTE SPRINKLE) capsule 500 mg (has no administration in time range)   docusate sodium (COLACE) capsule 100 mg (has no administration in time range)   nystatin (MYCOSTATIN) powder (has no administration in time range)   heparin (porcine) subcutaneous injection 5,000 Units (has no administration in time range)   valproate (DEPACON) 1,000 mg in sodium chloride 0 9 % 50 mL BOLUS (has no administration in time range)   sodium chloride 0 9 % bolus 1,000 mL (0 mL Intravenous Stopped 3/15/20 1232)     Followed by   sodium chloride 0 9 % bolus 1,000 mL (0 mL Intravenous Stopped 3/15/20 1412)     Followed by   sodium chloride 0 9 % bolus 1,000 mL (0 mL Intravenous Stopped 3/15/20 1413)   cefTRIAXone (ROCEPHIN) IVPB (premix) 1,000 mg (0 mg Intravenous Stopped 3/15/20 1412)   sodium bicarbonate 75 mEq in sodium chloride 0 45 % 1,000 mL infusion (100 mL/hr Intravenous New Bag 3/15/20 1640)       Diagnostic Studies  Results Reviewed     Procedure Component Value Units Date/Time    Basic metabolic panel [[id number]]     Lab Status:  No result Specimen:  Blood     Lactic acid, plasma [[id number]]  (Normal) Collected:  03/15/20 1822    Lab Status:  Final result Specimen:  Blood from Arm, Left Updated:  03/15/20 1844     LACTIC ACID 1 5 mmol/L     Narrative:       Result may be elevated if tourniquet was used during collection  Valproic acid level, total [[id number]]  (Abnormal) Collected:  03/15/20 1518    Lab Status:  Final result Specimen:  Blood from Arm, Left Updated:  03/15/20 1809     Valproic Acid, Total 18 ug/mL     Blood gas, arterial [[id number]]  (Abnormal) Collected:  03/15/20 1555    Lab Status:  Final result Specimen:  Blood, Arterial from Radial, Left Updated:  03/15/20 1606     pH, Arterial 7 439     pCO2, Arterial 20 5 mm Hg      pO2, Arterial 109 8 mm Hg      HCO3, Arterial 13 6 mmol/L      Base Excess, Arterial -8 7 mmol/L      O2 Content, Arterial 15 4 mL/dL      O2 HGB,Arterial  97 4 %      SOURCE Radial, Left     FERMIN TEST Yes     Nasal Cannula 2    Blood culture #1 [[id number]] Collected:  03/15/20 1119    Lab Status:  Preliminary result Specimen:  Blood from Arm, Right Updated:  03/15/20 1601     Blood Culture Received in Microbiology Lab  Culture in Progress  Blood culture #2 [[id number]] Collected:  03/15/20 1103    Lab Status:  Preliminary result Specimen:  Blood from Arm, Left Updated:  03/15/20 1601     Blood Culture Received in Microbiology Lab  Culture in Progress      Troponin I [[id number]]  (Normal) Collected:  03/15/20 1518    Lab Status:  Final result Specimen:  Blood from Arm, Left Updated:  03/15/20 1544     Troponin I 0 04 ng/mL     Basic metabolic panel [[id number]]  (Abnormal) Collected:  03/15/20 1518    Lab Status:  Final result Specimen:  Blood from Arm, Left Updated:  03/15/20 1541     Sodium 144 mmol/L      Potassium 5 4 mmol/L      Chloride 110 mmol/L      CO2 16 mmol/L      ANION GAP 18 mmol/L       mg/dL      Creatinine 7 78 mg/dL      Glucose 91 mg/dL      Calcium 7 8 mg/dL      eGFR 5 ml/min/1 73sq m     Narrative:       [place] guidelines for Chronic Kidney Disease (CKD):     Stage 1 with normal or high GFR (GFR > 90 mL/min/1 73 square meters)    Stage 2 Mild CKD (GFR = 60-89 mL/min/1 73 square meters)    Stage 3A Moderate CKD (GFR = 45-59 mL/min/1 73 square meters)    Stage 3B Moderate CKD (GFR = 30-44 mL/min/1 73 square meters)    Stage 4 Severe CKD (GFR = 15-29 mL/min/1 73 square meters)    Stage 5 End Stage CKD (GFR <15 mL/min/1 73 square meters)  Note: GFR calculation is accurate only with a steady state creatinine    Sodium, urine, random [[id number]] Collected:  03/15/20 1327    Lab Status: In process Specimen:  Urine, Catheter Updated:  03/15/20 1331    Urea nitrogen, urine [[id number]] Collected:  03/15/20 1327    Lab Status: In process Specimen:  Urine, Catheter Updated:  03/15/20 1331    Creatinine, urine, random [[id number]] Collected:  03/15/20 1327    Lab Status: In process Specimen:  Urine, Catheter Updated:  03/15/20 1331    Urine Microscopic [[id number]]  (Abnormal) Collected:  03/15/20 1156    Lab Status:  Final result Specimen:  Urine, Indwelling Hoffmann Catheter Updated:  03/15/20 1207     RBC, UA 20-30 /hpf      WBC, UA Innumerable /hpf      Epithelial Cells Occasional /hpf      Bacteria, UA Innumerable /hpf     Narrative:       LOW VOLUME OF URINE SENT  MICROSCOPIC PERFORMED ON AN UNSPUN URINE  VOLUME SUFFICIENT FOR CULTURE    Urine culture [[id number]] Collected:  03/15/20 1156    Lab Status:   In process Specimen:  Urine, Indwelling Hoffmann Catheter Updated:  03/15/20 1207    UA w Reflex to Microscopic w Reflex to Culture [[id number]]  (Abnormal) Collected:  03/15/20 1156    Lab Status:  Final result Specimen:  Urine, Indwelling Hoffmann Catheter Updated:  03/15/20 1205     Color, UA Yellow     Clarity, UA Cloudy     Specific Gravity, UA 1 015     pH, UA 8 5     Leukocytes, UA Large     Nitrite, UA Negative     Protein, UA >=300 mg/dl      Glucose, UA Negative mg/dl      Ketones, UA Negative mg/dl      Urobilinogen, UA 0 2 E U /dl      Bilirubin, UA Small     Blood, UA Large    Influenza A/B and RSV PCR [[id number]]  (Normal) Collected:  03/15/20 1115    Lab Status:  Final result Specimen:  Nasopharyngeal Swab Updated:  03/15/20 1159     INFLUENZA A PCR None Detected     INFLUENZA B PCR None Detected     RSV PCR None Detected    Comprehensive metabolic panel [[id number]]  (Abnormal) Collected:  03/15/20 1103    Lab Status:  Final result Specimen:  Blood from Arm, Left Updated:  03/15/20 1140     Sodium 143 mmol/L      Potassium 5 6 mmol/L      Chloride 108 mmol/L      CO2 18 mmol/L      ANION GAP 17 mmol/L       mg/dL      Creatinine 8 24 mg/dL      Glucose 97 mg/dL      Calcium 8 1 mg/dL      AST 52 U/L      ALT 87 U/L      Alkaline Phosphatase 131 U/L      Total Protein 6 8 g/dL      Albumin 1 8 g/dL      Total Bilirubin 0 55 mg/dL      eGFR 5 ml/min/1 73sq m     Narrative:       National Kidney Disease Foundation guidelines for Chronic Kidney Disease (CKD):     Stage 1 with normal or high GFR (GFR > 90 mL/min/1 73 square meters)    Stage 2 Mild CKD (GFR = 60-89 mL/min/1 73 square meters)    Stage 3A Moderate CKD (GFR = 45-59 mL/min/1 73 square meters)    Stage 3B Moderate CKD (GFR = 30-44 mL/min/1 73 square meters)    Stage 4 Severe CKD (GFR = 15-29 mL/min/1 73 square meters)    Stage 5 End Stage CKD (GFR <15 mL/min/1 73 square meters)  Note: GFR calculation is accurate only with a steady state creatinine    Lactic acid, plasma x2 [[id number]]  (Normal) Collected:  03/15/20 1103    Lab Status:  Final result Specimen:  Blood from Arm, Left Updated:  03/15/20 1134     LACTIC ACID 2 0 mmol/L     Narrative:       Result may be elevated if tourniquet was used during collection  CBC and differential [[id number]]  (Abnormal) Collected:  03/15/20 1103    Lab Status:  Final result Specimen:  Blood from Arm, Left Updated:  03/15/20 1133     WBC 9 60 Thousand/uL      RBC 3 81 Million/uL      Hemoglobin 11 3 g/dL      Hematocrit 34 1 %      MCV 90 fL      MCH 29 7 pg      MCHC 33 1 g/dL      RDW 14 4 %      MPV 11 4 fL      Platelets 960 Thousands/uL      nRBC 0 /100 WBCs     Narrative: This is an appended report    These results have been appended to a previously verified report  Troponin I [[id number]]  (Normal) Collected:  03/15/20 1103    Lab Status:  Final result Specimen:  Blood from Arm, Left Updated:  03/15/20 1132     Troponin I 0 04 ng/mL                  CT abdomen pelvis wo contrast   Final Result by Shameka Henry MD (03/15 7935)   Left greater than right hydroureteronephrosis as above without apparent distal obstructive pathology  The urinary bladder appears decompressed with a Hoffmann catheter in place and exhibits intravesicular calculi as on previous studies  Prostatomegaly noted    that may be result in bladder outlet obstruction  There is also left perinephric and periureteral stranding identified and correlation with urinalysis is recommended for possible underlying infection  Workstation performed: [id number]         CT head without contrast   Final Result by Ariana Poe DO (03/15 1522)      No acute intracranial abnormality  Microangiopathic changes  Workstation performed: [id number]         US kidney and bladder   Final Result by Svetlana Ellis MD (03/15 130)      Moderate left-sided hydronephrosis  Debris and probable hemorrhage is noted within the urinary bladder with a Hoffmann catheter in place  Note is also made of a urinary bladder calculus         Workstation performed: [id number]         XR chest portable - 1 view    (Results Pending)              Procedures  ECG 12 Lead Documentation Only  Date/Time: 3/15/2020 11:11 AM  Performed by: Orville Grace PA-C  Authorized by: Orville Grace PA-C     Indications / Diagnosis:  Lethargy  ECG reviewed by me, the ED Provider: yes    Patient location:  ED  Previous ECG:     Previous ECG:  Compared to current  Interpretation:     Interpretation: normal    Rate:     ECG rate:  101    ECG rate assessment: tachycardic    Rhythm:     Rhythm: sinus tachycardia    Ectopy:     Ectopy: PVCs      PVCs:  Infrequent  QRS:     QRS axis:  Normal    QRS intervals: Normal  Conduction:     Conduction: normal    ST segments:     ST segments:  Normal  T waves:     T waves: non-specific      CriticalCare Time  Performed by: Rd Purcell PA-C  Authorized by: Rd Purcell PA-C                ED Course  ED Course as of Mar 15 1946   Sun Mar 15, 2020   1111 EKG:  Sinus tachycardia with prematures supraventricular complexes and occasional PVC  Incomplete right bundle branch block no acute ischemic changes, ventricular rate 101 beats per minute      1117 Called and left message [facility] to request additional information      1200 CBC:  No leukocytosis  Hemoglobin 11 3  CMP:  Potassium 5 6, anion gap 17, AST and ALT 52, 87 respectively  Alk-phos 131  Significantly elevated from baseline: creatinine 8 24 and           1203 Lactic acid 2 0      1203 Troponin 0 04      1214 UA significant for UTI - will treat with Rocephin      8361 Nursing reports patient's bladder scan significant for 1 L fluid in the bladder  Hoffmann catheter dry  Will replace Hoffmann      1231 TT to Dr Yarely Langston regarding admission  1202 [facility] Dr Yarely Langston request running case by Nephology   Kimmie Bowers NP from nephology TT and states she will see patient       [phone]: Moderate left-sided hydronephrosis      Debris and probable hemorrhage is noted within the urinary bladder with a Hoffmann catheter in place  Note is also made of a urinary bladder calculus  [phone] with patients son and daughter who report patient typically responds verbally however was not responding verbally nursing this morning  They were updated on patient's care as now  They request to stay updated in his care  [place]und[place] Celio Qureshi 44 NP saw patient at bedside and spoke with Dr Yarely Langston who both recommend urology consult  [facility] referral request placed      [phone] with Dr Roxy Falcon urologist who recommended noncontrast CT, repeat labs, continue fluids and irrigation of fully as needed    If creatinine improving feels patient is stable to stay here  [phone] Discussed Urology recommendations with Dr Elaine Vazquez and Marisol Almanzar NP        8233 Repeat BMP:  Potassium 5 4, anion gap 18, , creatinine 7 78  Repeat troponin 0 04  Will order ABG      1553 CT head: No acute intracranial abnormality  Microangiopathic changes  [address] [facility] requesting that I speak with ICU      1604 TT to Dr Kyleigh Baird -ICU physician      1271 Dr Kyleigh Baird states she will see the patient at bedside      1616 ABG: Co2: 20 5, pO2 109 8      1629 Dr Kyleigh Baird at bedside  Will accept the patient to her service  5 Patient's son was updated via telephone  1751 Patient continues to rest comfortably at this time  Patient more arousable at this time  Will open eyes to verbal stimuli  Fully stool draining approx  2600 mL drainage  Default Flowsheet Data (last 720 hours)      Sepsis Reassess     Row Name 03/15/20 2327                   Repeat Volume Status and Tissue Perfusion Assessment Performed    Repeat Volume Status and Tissue Perfusion Assessment Performed  Yes  -KS           Volume Status and Tissue Perfusion Post Fluid Resuscitation * Must Document All *    Vital Signs Reviewed (HR, RR, BP, T)  Yes  -KS        Shock Index Reviewed  Yes  -KS        Arterial Oxygen Saturation Reviewed (POx, SaO2 or SpO2)  Yes (comment %) 100%  -KS        Cardio  Normal S1/S2; Regular rate and rhythm; No murmor  -KS        Pulmonary  Normal effort;Clear to auscultation  -KS        Capillary Refill  Brisk  -KS        Peripheral Pulses  Radial;Dorsalis Pedis  -KS        Peripheral Pulse  +3  -KS        Dorsalis Pedis  +3  -KS        Skin  Warm;Dry  -KS        Urine output assessed  Adequate  -KS           *OR*   Intensive Monitoring- Must Document One of the Following Four *:    Vital Signs Reviewed  Yes  -KS        * Central Venous Pressure (CVP or RAP)          * Central Venous Oxygen (SVO2, ScvO2 or Oxygen saturation via central catheter)          * Bedside Cardiovascular US in IVC diameter and % collapse          * Passive Leg Raise OR Crystalloid Challenge  Crystalloid fluid challenge completed  -KS        Crystalloid fluid challenge completed  500mL in 15 minutes  -KS          User Key  (r) = Recorded By, (t) = Taken By, (c) = Cosigned By    [address] Name Provider Type    LIZETTE Escalante PA-C Physician Assistant                MDM  Number of Diagnoses or Management Options  Altered mental status: new and requires workup  UTI (urinary tract infection): new and requires workup     Amount and/or Complexity of Data Reviewed  Clinical lab tests: ordered and reviewed  Tests in the radiology section of CPT®: ordered and reviewed  Discuss the patient with other providers: yes  Independent visualization of images, tracings, or specimens: yes          Disposition  Final diagnoses: Altered mental status   UTI (urinary tract infection)     Time reflects when diagnosis was documented in both MDM as applicable and the Disposition within this note     Time User Action Codes Description Comment    3/15/2020  4:34 PM Shelli Drivers Add [R41 82] Altered mental status     3/15/2020  4:34 PM Shelli Drivers Add [N39 0] UTI (urinary tract infection)       ED Disposition     ED Disposition Condition Date/Time Comment    Admit Stable Sun Mar 15, 2020  4:34 PM Case was discussed with Dr Juliana Arriola and the patient's admission status was agreed to be Admission Status: inpatient status to the service of Dr Juliana Arriola           Follow-up Information    None         Current Discharge Medication List      CONTINUE these medications which have NOT CHANGED    Details   amLODIPine (NORVASC) 5 mg tablet Take 1 tablet (5 mg total) by mouth daily  Qty: 30 tablet, Refills: 0    Associated Diagnoses: Essential hypertension      divalproex sodium (DEPAKOTE SPRINKLE) 125 MG capsule Take 4 capsules (500 mg total) by mouth daily  Qty: 30 capsule, Refills: 0    Associated Diagnoses: SAH (subarachnoid hemorrhage) (HCC)      docusate sodium (COLACE) 100 mg capsule Take 1 capsule (100 mg total) by mouth 2 (two) times a day  Qty: 10 capsule, Refills: 0    Associated Diagnoses: Urinary retention      gabapentin (NEURONTIN) 100 mg capsule Take 1 capsule (100 mg total) by mouth daily at bedtime  Qty: 30 capsule, Refills: 0    Associated Diagnoses: SAH (subarachnoid hemorrhage) (HCC)      nystatin (MYCOSTATIN) powder Apply topically 2 (two) times a day  Qty: 15 g, Refills: 0    Associated Diagnoses: Mixed hyperlipidemia      promethazine (PHENERGAN) 25 mg tablet Take 25 mg by mouth every 6 (six) hours as needed for nausea or vomiting           No discharge procedures on file      PDMP Review     None          ED Provider  Electronically Signed by           Fernanda Darden PA-C  03/15/20 1947

## 2020-03-15 NOTE — ED NOTES
Bladder scan revealed 999ml urine  Current indwelling barrera cath dry and does not appear to be draining  Current barrera removed and new 16fr barrera w/ urimeter inserted w/o difficulty  Initial 1800 turbid, foul smelling urine noted  U/s at bedside for study         Miranda Hale RN  03/15/20 3952

## 2020-03-15 NOTE — ASSESSMENT & PLAN NOTE
· Patient has history of urinary retention, frequent UTIs, and multiple barrera catheters placed  · Cont flomax q hs  · Cont to monitor UOP q 1 hour

## 2020-03-16 PROBLEM — E87.0 HYPERNATREMIA: Status: ACTIVE | Noted: 2020-03-16

## 2020-03-16 LAB
ALBUMIN SERPL BCP-MCNC: 1.5 G/DL (ref 3.5–5)
ALP SERPL-CCNC: 105 U/L (ref 46–116)
ALT SERPL W P-5'-P-CCNC: 55 U/L (ref 12–78)
ANION GAP SERPL CALCULATED.3IONS-SCNC: 14 MMOL/L (ref 4–13)
ANION GAP SERPL CALCULATED.3IONS-SCNC: 15 MMOL/L (ref 4–13)
ANION GAP SERPL CALCULATED.3IONS-SCNC: 16 MMOL/L (ref 4–13)
ANION GAP SERPL CALCULATED.3IONS-SCNC: 16 MMOL/L (ref 4–13)
AST SERPL W P-5'-P-CCNC: 28 U/L (ref 5–45)
BILIRUB SERPL-MCNC: 0.4 MG/DL (ref 0.2–1)
BUN SERPL-MCNC: 156 MG/DL (ref 5–25)
BUN SERPL-MCNC: 175 MG/DL (ref 5–25)
BUN SERPL-MCNC: 180 MG/DL (ref 5–25)
BUN SERPL-MCNC: 182 MG/DL (ref 5–25)
CALCIUM SERPL-MCNC: 6.6 MG/DL (ref 8.3–10.1)
CALCIUM SERPL-MCNC: 8 MG/DL (ref 8.3–10.1)
CALCIUM SERPL-MCNC: 8.2 MG/DL (ref 8.3–10.1)
CALCIUM SERPL-MCNC: 8.2 MG/DL (ref 8.3–10.1)
CHLORIDE SERPL-SCNC: 106 MMOL/L (ref 100–108)
CHLORIDE SERPL-SCNC: 112 MMOL/L (ref 100–108)
CHLORIDE SERPL-SCNC: 113 MMOL/L (ref 100–108)
CHLORIDE SERPL-SCNC: 114 MMOL/L (ref 100–108)
CO2 SERPL-SCNC: 17 MMOL/L (ref 21–32)
CO2 SERPL-SCNC: 18 MMOL/L (ref 21–32)
CO2 SERPL-SCNC: 19 MMOL/L (ref 21–32)
CO2 SERPL-SCNC: 19 MMOL/L (ref 21–32)
CREAT SERPL-MCNC: 5.89 MG/DL (ref 0.6–1.3)
CREAT SERPL-MCNC: 7.32 MG/DL (ref 0.6–1.3)
CREAT SERPL-MCNC: 7.4 MG/DL (ref 0.6–1.3)
CREAT SERPL-MCNC: 7.49 MG/DL (ref 0.6–1.3)
ERYTHROCYTE [DISTWIDTH] IN BLOOD BY AUTOMATED COUNT: 14.4 % (ref 11.6–15.1)
GFR SERPL CREATININE-BSD FRML MDRD: 6 ML/MIN/1.73SQ M
GFR SERPL CREATININE-BSD FRML MDRD: 7 ML/MIN/1.73SQ M
GLUCOSE SERPL-MCNC: 66 MG/DL (ref 65–140)
GLUCOSE SERPL-MCNC: 77 MG/DL (ref 65–140)
GLUCOSE SERPL-MCNC: 86 MG/DL (ref 65–140)
GLUCOSE SERPL-MCNC: 92 MG/DL (ref 65–140)
HCT VFR BLD AUTO: 31.8 % (ref 36.5–49.3)
HGB BLD-MCNC: 10.5 G/DL (ref 12–17)
MAGNESIUM SERPL-MCNC: 2.2 MG/DL (ref 1.6–2.6)
MCH RBC QN AUTO: 30.1 PG (ref 26.8–34.3)
MCHC RBC AUTO-ENTMCNC: 33 G/DL (ref 31.4–37.4)
MCV RBC AUTO: 91 FL (ref 82–98)
NRBC BLD AUTO-RTO: 0 /100 WBCS
PHOSPHATE SERPL-MCNC: 5.4 MG/DL (ref 2.3–4.1)
PLATELET # BLD AUTO: 150 THOUSANDS/UL (ref 149–390)
PMV BLD AUTO: 11.5 FL (ref 8.9–12.7)
POTASSIUM SERPL-SCNC: 4.1 MMOL/L (ref 3.5–5.3)
POTASSIUM SERPL-SCNC: 4.7 MMOL/L (ref 3.5–5.3)
POTASSIUM SERPL-SCNC: 5 MMOL/L (ref 3.5–5.3)
POTASSIUM SERPL-SCNC: 5.1 MMOL/L (ref 3.5–5.3)
PROT SERPL-MCNC: 5.8 G/DL (ref 6.4–8.2)
RBC # BLD AUTO: 3.49 MILLION/UL (ref 3.88–5.62)
SODIUM SERPL-SCNC: 137 MMOL/L (ref 136–145)
SODIUM SERPL-SCNC: 147 MMOL/L (ref 136–145)
SODIUM SERPL-SCNC: 147 MMOL/L (ref 136–145)
SODIUM SERPL-SCNC: 148 MMOL/L (ref 136–145)
VALPROATE SERPL-MCNC: 32 UG/ML (ref 50–100)
VANCOMYCIN SERPL-MCNC: 13.8 UG/ML
WBC # BLD AUTO: 8.93 THOUSAND/UL (ref 4.31–10.16)

## 2020-03-16 PROCEDURE — 80164 ASSAY DIPROPYLACETIC ACD TOT: CPT | Performed by: PHYSICIAN ASSISTANT

## 2020-03-16 PROCEDURE — 80048 BASIC METABOLIC PNL TOTAL CA: CPT | Performed by: PHYSICIAN ASSISTANT

## 2020-03-16 PROCEDURE — 80202 ASSAY OF VANCOMYCIN: CPT | Performed by: PHYSICIAN ASSISTANT

## 2020-03-16 PROCEDURE — 83735 ASSAY OF MAGNESIUM: CPT | Performed by: PHYSICIAN ASSISTANT

## 2020-03-16 PROCEDURE — 84100 ASSAY OF PHOSPHORUS: CPT | Performed by: PHYSICIAN ASSISTANT

## 2020-03-16 PROCEDURE — 99232 SBSQ HOSP IP/OBS MODERATE 35: CPT | Performed by: INTERNAL MEDICINE

## 2020-03-16 PROCEDURE — 85027 COMPLETE CBC AUTOMATED: CPT | Performed by: PHYSICIAN ASSISTANT

## 2020-03-16 PROCEDURE — 80053 COMPREHEN METABOLIC PANEL: CPT | Performed by: PHYSICIAN ASSISTANT

## 2020-03-16 PROCEDURE — 80048 BASIC METABOLIC PNL TOTAL CA: CPT | Performed by: NURSE PRACTITIONER

## 2020-03-16 PROCEDURE — 99233 SBSQ HOSP IP/OBS HIGH 50: CPT | Performed by: PHYSICIAN ASSISTANT

## 2020-03-16 RX ORDER — CEFEPIME HYDROCHLORIDE 1 G/50ML
1000 INJECTION, SOLUTION INTRAVENOUS EVERY 24 HOURS
Status: DISCONTINUED | OUTPATIENT
Start: 2020-03-17 | End: 2020-03-18

## 2020-03-16 RX ORDER — VANCOMYCIN HYDROCHLORIDE 1 G/200ML
12.5 INJECTION, SOLUTION INTRAVENOUS ONCE AS NEEDED
Status: DISCONTINUED | OUTPATIENT
Start: 2020-03-16 | End: 2020-03-16

## 2020-03-16 RX ORDER — VANCOMYCIN HYDROCHLORIDE 1 G/200ML
12.5 INJECTION, SOLUTION INTRAVENOUS ONCE AS NEEDED
Status: COMPLETED | OUTPATIENT
Start: 2020-03-16 | End: 2020-03-16

## 2020-03-16 RX ORDER — SODIUM CHLORIDE 450 MG/100ML
100 INJECTION, SOLUTION INTRAVENOUS CONTINUOUS
Status: DISCONTINUED | OUTPATIENT
Start: 2020-03-16 | End: 2020-03-17

## 2020-03-16 RX ORDER — BISACODYL 10 MG
10 SUPPOSITORY, RECTAL RECTAL DAILY PRN
Status: DISCONTINUED | OUTPATIENT
Start: 2020-03-16 | End: 2020-03-18

## 2020-03-16 RX ADMIN — HEPARIN SODIUM 5000 UNITS: 5000 INJECTION INTRAVENOUS; SUBCUTANEOUS at 05:44

## 2020-03-16 RX ADMIN — NYSTATIN: 100000 POWDER TOPICAL at 18:10

## 2020-03-16 RX ADMIN — SODIUM CHLORIDE 100 ML/HR: 0.45 INJECTION, SOLUTION INTRAVENOUS at 09:42

## 2020-03-16 RX ADMIN — VANCOMYCIN HYDROCHLORIDE 1000 MG: 1 INJECTION, SOLUTION INTRAVENOUS at 18:31

## 2020-03-16 RX ADMIN — HEPARIN SODIUM 5000 UNITS: 5000 INJECTION INTRAVENOUS; SUBCUTANEOUS at 15:29

## 2020-03-16 RX ADMIN — SODIUM CHLORIDE 100 ML/HR: 0.45 INJECTION, SOLUTION INTRAVENOUS at 19:50

## 2020-03-16 RX ADMIN — CEFTRIAXONE 1000 MG: 1 INJECTION, SOLUTION INTRAVENOUS at 12:53

## 2020-03-16 RX ADMIN — HEPARIN SODIUM 5000 UNITS: 5000 INJECTION INTRAVENOUS; SUBCUTANEOUS at 21:40

## 2020-03-16 RX ADMIN — VALPROATE SODIUM 500 MG: 100 INJECTION, SOLUTION INTRAVENOUS at 09:42

## 2020-03-16 RX ADMIN — NYSTATIN: 100000 POWDER TOPICAL at 09:42

## 2020-03-16 NOTE — ASSESSMENT & PLAN NOTE
· Likely due to toxic metabolic causes like UTI and uremia  · Patient aphasic at baseline  · [facility]  · Cont to monitor and neurochecks  · Avoid benzos  · Sleep hygiene

## 2020-03-16 NOTE — PROGRESS NOTES
Vancomycin Assessment    Jarvis Lanza is a 80 y o  male who is currently receiving vancomycin Pulse-dosing - 1750mg IV once (20mg/kg) for other, urinary tract infection sepsis secondary to UTI   Relevant clinical data and objective history reviewed:  Creatinine   Date Value Ref Range Status   03/16/2020 5 89 (H) 0 60 - 1 30 mg/dL Final     Comment:     Standardized to IDMS reference method   03/16/2020 7 40 (H) 0 60 - 1 30 mg/dL Final     Comment:     Standardized to IDMS reference method   03/16/2020 7 49 (H) 0 60 - 1 30 mg/dL Final     Comment:     Standardized to IDMS reference method   12/02/2014 0 97 0 60 - 1 30 mg/dL Final     Comment:     Standardized to IDMS reference method   02/18/2013 0 96  Final     Vancomycin Rm   Date Value Ref Range Status   03/16/2020 13 8 ug/mL Final     /76   Pulse 77   Temp 98 °F (36 7 °C) (Axillary)   Resp (!) 26   Ht 5' 10" (1 778 m)   Wt 87 1 kg (192 lb 0 3 oz)   SpO2 97%   BMI 27 55 kg/m²   I/O last 3 completed shifts: In: 3150 [IV Piggyback:3150]  Out: 3510 [Urine:3510]  Lab Results   Component Value Date/Time     (H) 03/16/2020 04:38 PM    BUN 16 12/02/2014 02:37 AM    WBC 8 93 03/16/2020 04:32 AM    WBC 7 20 12/02/2014 02:37 AM    HGB 10 5 (L) 03/16/2020 04:32 AM    HGB 15 7 12/02/2014 02:37 AM    HCT 31 8 (L) 03/16/2020 04:32 AM    HCT 44 6 12/02/2014 02:37 AM    MCV 91 03/16/2020 04:32 AM    MCV 90 12/02/2014 02:37 AM     03/16/2020 04:32 AM     12/02/2014 02:37 AM     Temp Readings from Last 3 Encounters:   03/16/20 98 °F (36 7 °C) (Axillary)   01/09/20 97 6 °F (36 4 °C) (Oral)   10/12/19 98 3 °F (36 8 °C)     Vancomycin Days of Therapy: 2    Assessment/Plan  The patient is currently on vancomycin utilizing pulse dosing  Baseline risks associated with therapy include: pre-existing renal impairment, concomitant nephrotoxic medications and advanced age  The patient is on  pulse-dosing and received 1750mg IV ONCE (20mg/kg) on 03/15  Random trough on 03/16 came back 13 8; so will re-dose 10-15mg/kg when trough <20  A goal of 15-20 (appropriate for most indications) ; therefore, after clinical evaluation will be changed to 1000mg IV once on 03/16 @ 1800 with random trough schedule for 3/17  Pharmacy will continue to follow closely for s/sx of nephrotoxicity, infusion reactions and appropriateness of therapy  BMP and CBC will be ordered per protocol  Plan for trough as patient approaches steady state, prior to the other  dose at approximately 1730 on 03/17/20  Pharmacy will continue to follow the patients culture results and clinical progress daily      Tanner Mcfarland, Pharmacist

## 2020-03-16 NOTE — PROGRESS NOTES
Admit with sepsis ( urosepsis) indwelling barrera: FRANCOIS with creatine ^^^, encephalopathic  Scheduled IV meds: Ceftriaxone, IV fluids: IV Valproate    I met patient's son and daughter Helio Gonzalez at the bedside  They are his POAs      03/16/20 Maurilio   Patient Information   Mental Status Other (Comment)  (decrease level of csness)   Primary Caregiver Other (Comment)   Support System Immediate family   Activities of Daily Living Prior to Admission   Functional Status Totally dependent   Living Arrangement Other (Comment)   Ambulation Totally dependent   Income Information   Income Source Pension/longterm   Means of Transportation   [facility]   [facility] )     Son: Lazarus Scarpa shared patient has been at [facility] for the last 4 months, dementia, he was non ambulating, lifted to the chair, chronic barrera    Ed shared that Mrs Lorraine Herndon was just admitted to [facility] this AM and was looking forward to seeing her  at [facility]  At this time is is families choice to have patient return to [facility]  Referral was made  Will continue to follow

## 2020-03-16 NOTE — ASSESSMENT & PLAN NOTE
· Likely due to toxic metabolic causes like UTI and uremia  · Patient aphasic at baseline  · [facility] negative for acute process on admission  · Cont to monitor and neurochecks

## 2020-03-16 NOTE — ASSESSMENT & PLAN NOTE
· Patient currently in sinus rhythm  · Not a candidate for [facility] per Cardiology notes as outpatient

## 2020-03-16 NOTE — PLAN OF CARE
Problem: Potential for Falls  Goal: Patient will remain free of falls  Description  INTERVENTIONS:  - Assess patient frequently for physical needs  -  Identify cognitive and physical deficits and behaviors that affect risk of falls    -  Transfer fall precautions as indicated by assessment   - Educate patient/family on patient safety including physical limitations  - Instruct patient to call for assistance with activity based on assessment  - Modify environment to reduce risk of injury  - Consider OT/PT consult to assist with strengthening/mobility  Outcome: Progressing     Problem: PAIN - ADULT  Goal: Verbalizes/displays adequate comfort level or baseline comfort level  Description  Interventions:  - Encourage patient to monitor pain and request assistance  - Assess pain using appropriate pain scale  - Administer analgesics based on type and severity of pain and evaluate response  - Implement non-pharmacological measures as appropriate and evaluate response  - Consider cultural and social influences on pain and pain management  - Notify physician/advanced practitioner if interventions unsuccessful or patient reports new pain  Outcome: Progressing     Problem: INFECTION - ADULT  Goal: Absence or prevention of progression during hospitalization  Description  INTERVENTIONS:  - Assess and monitor for signs and symptoms of infection  - Monitor lab/diagnostic results  - Monitor all insertion sites, i e  indwelling lines, tubes, and drains  - Monitor endotracheal if appropriate and nasal secretions for changes in amount and color  - Transfer appropriate cooling/warming therapies per order  - Administer medications as ordered  - Instruct and encourage patient and family to use good hand hygiene technique  - Identify and instruct in appropriate isolation precautions for identified infection/condition  Outcome: Progressing  Goal: Absence of fever/infection during neutropenic period  Description  INTERVENTIONS:  - Monitor WBC    Outcome: Progressing     Problem: SAFETY ADULT  Goal: Maintain or return to baseline ADL function  Description  INTERVENTIONS:  -  Assess patient's ability to carry out ADLs; assess patient's baseline for ADL function and identify physical deficits which impact ability to perform ADLs (bathing, care of mouth/teeth, toileting, grooming, dressing, etc )  - Assess/evaluate cause of self-care deficits   - Assess range of motion  - Assess patient's mobility; develop plan if impaired  - Assess patient's need for assistive devices and provide as appropriate  - Encourage maximum independence but intervene and supervise when necessary  - Involve family in performance of ADLs  - Assess for home care needs following discharge   - Consider OT consult to assist with ADL evaluation and planning for discharge  - Provide patient education as appropriate  Outcome: Progressing  Goal: Maintain or return mobility status to optimal level  Description  INTERVENTIONS:  - Assess patient's baseline mobility status (ambulation, transfers, stairs, etc )    - Identify cognitive and physical deficits and behaviors that affect mobility  - Identify mobility aids required to assist with transfers and/or ambulation (gait belt, sit-to-stand, lift, walker, cane, etc )  - Leonard fall precautions as indicated by assessment  - Record patient progress and toleration of activity level on Mobility SBAR; progress patient to next Phase/Stage  - Instruct patient to call for assistance with activity based on assessment  - Consider rehabilitation consult to assist with strengthening/weightbearing, etc   Outcome: Progressing     Problem: DISCHARGE PLANNING  Goal: Discharge to home or other facility with appropriate resources  Description  INTERVENTIONS:  - Identify barriers to discharge w/patient and caregiver  - Arrange for needed discharge resources and transportation as appropriate  - Identify discharge learning needs (meds, wound care, etc )  - Arrange for interpretive services to assist at discharge as needed  - Refer to Case Management Department for coordinating discharge planning if the patient needs post-hospital services based on physician/advanced practitioner order or complex needs related to functional status, cognitive ability, or social support system  Outcome: Progressing     Problem: Knowledge Deficit  Goal: Patient/family/caregiver demonstrates understanding of disease process, treatment plan, medications, and discharge instructions  Description  Complete learning assessment and assess knowledge base    Interventions:  - Provide teaching at level of understanding  - Provide teaching via preferred learning methods  Outcome: Progressing     Problem: Prexisting or High Potential for Compromised Skin Integrity  Goal: Skin integrity is maintained or improved  Description  INTERVENTIONS:  - Identify patients at risk for skin breakdown  - Assess and monitor skin integrity  - Assess and monitor nutrition and hydration status  - Monitor labs   - Assess for incontinence   - Turn and reposition patient  - Assist with mobility/ambulation  - Relieve pressure over bony prominences  - Avoid friction and shearing  - Provide appropriate hygiene as needed including keeping skin clean and dry  - Evaluate need for skin moisturizer/barrier cream  - Collaborate with interdisciplinary team   - Patient/family teaching  - Consider wound care consult   Outcome: Progressing

## 2020-03-16 NOTE — PROGRESS NOTES
Jason Ramirez is a 35-year-old male seen in the emergency room at [facility]  Baseline creatinine in the mid 1 range  He is managed with a chronic indwelling Hoffmann catheter  He is known to Dr Mendoza Guo from the outpatient setting  In the emergency room he was found to have an obstructed Hoffmann which was exchanged  His creatinine was 8  He initially had output of 3 5 liters of urine  Repeat creatinine is pending today  A noncontrast CT scan showing left greater than right hydronephrosis without obstructive uropathy is identified  Prostatomegaly is noted  Symptoms are most consistent with bladder outlet obstruction  Recommend maintaining Hoffmann catheter to gravity drainage  Monitor for postobstructive diuresis  Monitor electrolytes  Check urine culture to exclude infection  Appreciate input from nephrology  Monitor in ICU as medically appropriate  There is no indication to transfer the patient to next level care from a urologic standpoint at this time as long as his Hoffmann catheter continues to drain appropriately    Final decision on transfer at the discretion of the medical team

## 2020-03-16 NOTE — ASSESSMENT & PLAN NOTE
· Patient was admitted to [facility] in 9/2019 with seizures and SAH s/p fall  · Patient remains aphasic at baseline  · Patient was started and maintained on Depakote  mg daily  · Check valproic acid level subtherapeutic on admit at 18  Administered loading dose of depakote     · Recheck on 3/16 was 38  · Continue IV Valproate

## 2020-03-16 NOTE — CONSULTS
Consult Note - Wound   Anu Gan 80 y o  male MRN: [id number]  Unit/Bed#: -01 Encounter: [id number]      History and Present Illness:    Patient is a 80year old male admitted for septic UTI  Wound care consulted for multiple skin tears and possible skin breakdown on the sacrum  Patient does not open eyes or verbally respond but does physically push back in response to painful stimuli  Primary RN at bedside for assessment today  Patient has extremely limited mobility, barrera catheter in place and is incontinent of bowel  Assessment Findings:     1  Left anterior proximal thigh: two partial thickness open areas present on admission that appear to be skin tears  Scant amount of drainage, wound edges are dried and wound bed is pale pink  2  Right proximal forearm: partial thickness skin tear present on admission with dusky skin flap still intact not able to be completely reapproximated  3  Sacrum/bilateral buttocks: blanchable pink, dry and intact at this time  4  Bilateral heels: blanchable pink, dry and intact      Wound Care    1  Left thigh: irrigate with normal saline and pat dry  Cut Dermagran to fit size of wound and place directly on wound base  Cover with gauze and secure with Tegaderm, change once daily  2  Right arm: irrigate with normal saline and pat dry  Cut Dermagran to fit size of wound and place directly on wound base  Cover with gauze and secure with francisco, change once daily  Skin care plans:  1-Calazime to sacrum, buttocks TID and PRN  2-Hydraguard to bilateral heel BID and PRN  3-Elevate heels to offload pressure  4-Ehob cushion when out of bed  5-Turn/repoisiton q2h or when medically stable for pressure re-distribution on skin  6-Moisturize skin daily with skin nourishing cream      Wound Care will continue to follow  Call or Tiger text with any questions      Wound 03/15/20 Skin tear Arm (Active)   Wound Image   3/16/2020  7:40 AM   Wound Description Fragile;Pale;Edema 3/16/2020  7:40 AM   Shanita-wound Assessment Fragile; Intact 3/16/2020  7:40 AM   Wound Length (cm) 2 6 cm 3/16/2020  7:40 AM   Wound Width (cm) 1 5 cm 3/16/2020  7:40 AM   Wound Depth (cm) 0 1 3/16/2020  7:40 AM   Calculated Wound Area (cm^2) 3 9 cm^2 3/16/2020  7:40 AM   Calculated Wound Volume (cm^3) 0 39 cm^3 3/16/2020  7:40 AM   Drainage Amount Small 3/16/2020  7:40 AM   Drainage Description Serosanguineous 3/16/2020  7:40 AM   Non-staged Wound Description Partial thickness 3/16/2020  7:40 AM   Treatments Site care 3/16/2020  7:40 AM   Dressing Dermagran gauze;Gauze 3/16/2020  7:40 AM   Dressing Changed New 3/16/2020  7:40 AM   Patient Tolerance Tolerated well 3/16/2020  7:40 AM   Dressing Status Clean;Dry; Intact 3/16/2020  7:40 AM       Wound 03/15/20 Skin tear Thigh Left (Active)   Wound Image   3/16/2020  7:41 AM   Wound Description Fragile;Pink;Dry 3/16/2020  7:41 AM   Shanita-wound Assessment Dry; Intact 3/16/2020  7:41 AM   Wound Length (cm) 3 cm 3/16/2020  7:41 AM   Wound Width (cm) 6 cm 3/16/2020  7:41 AM   Wound Depth (cm) 0 1 3/16/2020  7:41 AM   Calculated Wound Area (cm^2) 18 cm^2 3/16/2020  7:41 AM   Calculated Wound Volume (cm^3) 1 8 cm^3 3/16/2020  7:41 AM   Drainage Amount Scant 3/16/2020  7:41 AM   Drainage Description Serosanguineous 3/16/2020  7:41 AM   Non-staged Wound Description Partial thickness 3/16/2020  7:41 AM   Treatments Site care 3/16/2020  7:41 AM   Dressing Gauze;Dermagran gauze 3/16/2020  7:41 AM   Dressing Changed New 3/16/2020  7:41 AM   Patient Tolerance Tolerated well 3/16/2020  7:41 AM   Dressing Status Clean;Dry; Intact 3/16/2020  7:41 AM

## 2020-03-16 NOTE — PROGRESS NOTES
Progress Note Dulce Tay 1/30/1924, 80 y o  male MRN: [id number]    Unit/Bed#: -01 Encounter: [id number]    Primary Care Provider: Lory Brown MD   Date and time admitted to hospital: 3/15/2020 10:47 AM        Urinary retention  Assessment & Plan  · Patient has history of urinary retention, frequent UTIs, and multiple barrera catheters placed  · Cont flomax q hs  · Cont to monitor UOP q 1 hour    FRANCOIS on CKD (chronic kidney disease) stage 3, GFR 30-59 ml/min ([facility])  Assessment & Plan  · Baseline Cr appears to be 1 2-1 3  · Barrera was not draining in ER and was replaced with 1 liter output  · CT A/P shows "moderate right and moderate to severe left hydroureteronephrosis identified without distal obstructive pathology identified  There is left perinephric stranding as well as stranding in the region of the left renal pelvis and proximal   Ureter "   · Cont to trend Cr now that obstructive uropathy resolved   · Urology spoke with ER team and did not recommend intervention for hydroureteronephrosis  · Nephrology consulted and following  · Not recommending HD due to age and comorbities    Encephalopathy  Assessment & Plan  · Likely due to toxic metabolic causes like UTI and uremia  · Patient aphasic at baseline  · [facility] negative for acute process on admission  · Cont to monitor and neurochecks    H/O [facility] (subarachnoid hemorrhage) and Seizures 9/2019  Assessment & Plan  · Patient was admitted to [facility] in 9/2019 with seizures and SAH s/p fall  · Patient remains aphasic at baseline  · Patient was started and maintained on Depakote  mg daily  · Check valproic acid level subtherapeutic  Administered loading dose of depakote  Recheck valproic acid in the morning       A-fib [facility])  Assessment & Plan  · Patient currently in sinus rhythm  · Not a candidate for [facility] per Cardiology notes as outpatient    Coronary artery disease involving native coronary artery of native heart without angina pectoris  Assessment & Plan  · Patient has bare metal stent placed  and was seeing outpatient Cardiology  · No longer taking ASA per records  · Last echo 65% with grade 1 diastolic dysfunction    Essential hypertension  Assessment & Plan  · Patient takes norvasc 5 mg daily at home  · Cont and monitor    Mixed hyperlipidemia  Assessment & Plan  · Patient refuses statin    * Sepsis secondary to UTI  Assessment & Plan  · Patient had MRSA UTI in 10/2019 and Serratia UTI 2020  · Received 3 L NSS in ER, cultures and lactate sent, will trend  · Patient started on Rocephin, but will add Vanco secondary to H/O MRSA  · Monitor closely    ----------------------------------------------------------------------------------------  HPI/24hr events: No significant events overnight  Mental status improving  Urinary output adequate overnight  Disposition: Continue Stepdown Level 1 level of care   Code Status: Level 3 - DNAR and DNI  ---------------------------------------------------------------------------------------    Review of Systems  Review of systems was reviewed and negative unless stated above in HPI/24-hour events   ---------------------------------------------------------------------------------------  OBJECTIVE    Vitals   Vitals:    20 0200 20 0300 20 0400 20 0500   BP: 111/66 120/60 139/78 120/58   BP Location:    Left arm   Pulse: 87 79 88 85   Resp: (!) 26 (!) 26 (!) 28 (!) 27   Temp:    97 9 °F (36 6 °C)   TempSrc:    Axillary   SpO2: 96% 96% 97% 97%   Weight:         Temp (24hrs), Av 8 °F (36 6 °C), Min:97 4 °F (36 3 °C), Max:98 2 °F (36 8 °C)  Current: Temperature: 97 9 °F (36 6 °C)        SpO2: SpO2: 97 %  O2 Flow Rate (L/min): 3 L/min    Physical Exam   Constitutional: He appears well-developed  He appears cachectic  He is sleeping  He appears toxic  Responds only to painful stimuli  HENT:   Head: Normocephalic and atraumatic     Right Ear: External ear normal    Left Ear: External ear normal    Nose: Nose normal    Eyes: Pupils are equal, round, and reactive to light  Conjunctivae and EOM are normal  No scleral icterus  Neck: Neck supple  No JVD present  No tracheal deviation present  Cardiovascular: Normal rate, regular rhythm and intact distal pulses  Exam reveals no gallop and no friction rub  Pulmonary/Chest: Effort normal  No stridor  No respiratory distress  He has no wheezes  He has no rales  He exhibits no tenderness  Abdominal: Soft  Bowel sounds are normal  He exhibits no distension and no mass  There is no tenderness  There is no rebound and no guarding  Musculoskeletal: He exhibits edema  Neurological:   Responds to painful stimuli only  Skin: Skin is warm and dry  No rash noted  He is not diaphoretic  No erythema  No pallor       Invasive/non-invasive ventilation settings   Respiratory    Lab Data (Last 4 hours)    None         O2/Vent Data (Last 4 hours)    None                Laboratory and Diagnostics:  Results from last 7 days   Lab Units 03/16/20  0432 03/15/20  2116 03/15/20  1103   WBC Thousand/uL 8 93  --  9 60   HEMOGLOBIN g/dL 10 5*  --  11 3*   HEMATOCRIT % 31 8*  --  34 1*   PLATELETS Thousands/uL 150 149 174   BANDS PCT %  --   --  10*   MONO PCT %  --   --  16*     Results from last 7 days   Lab Units 03/16/20  0432 03/15/20  2116 03/15/20  1518 03/15/20  1103   SODIUM mmol/L 147* 146* 144 143   POTASSIUM mmol/L 5 0 5 3 5 4* 5 6*   CHLORIDE mmol/L 113* 112* 110* 108   CO2 mmol/L 18* 20* 16* 18*   ANION GAP mmol/L 16* 14* 18* 17*   BUN mg/dL 175* 172* 169* 181*   CREATININE mg/dL 7 49* 7 65* 7 78* 8 24*   CALCIUM mg/dL 8 2* 7 7* 7 8* 8 1*   GLUCOSE RANDOM mg/dL 86 91 91 97   ALT U/L 55  --   --  87*   AST U/L 28  --   --  52*   ALK PHOS U/L 105  --   --  131*   ALBUMIN g/dL 1 5*  --   --  1 8*   TOTAL BILIRUBIN mg/dL 0 40  --   --  0 55     Results from last 7 days   Lab Units 03/16/20  0432   MAGNESIUM mg/dL 2 2   PHOSPHORUS mg/dL 5 4*           Results from last 7 days   Lab Units 03/15/20  1518 03/15/20  1103   TROPONIN I ng/mL 0 04 0 04     Results from last 7 days   Lab Units 03/15/20  1822 03/15/20  1103   LACTIC ACID mmol/L 1 5 2 0     ABG:  Results from last 7 days   Lab Units 03/15/20  1555   PH ART  7 439   PCO2 ART mm Hg 20 5*   PO2 ART mm Hg 109 8   HCO3 ART mmol/L 13 6*   BASE EXC ART mmol/L -8 7   ABG SOURCE  Radial, Left     VBG:  Results from last 7 days   Lab Units 03/15/20  1555   ABG SOURCE  Radial, Left           Micro  Results from last 7 days   Lab Units 03/15/20  1119 03/15/20  1103   BLOOD CULTURE  Received in Microbiology Lab  Culture in Progress  Received in Microbiology Lab  Culture in Progress  Imaging: I have personally reviewed pertinent reports  Intake and Output  I/O       03/14 0701 - 03/15 0700 03/15 0701 - 03/16 0700    IV Piggyback  3150    Total Intake(mL/kg)  3150 (36 2)    Urine (mL/kg/hr)  3200    Total Output  3200    Net  -50                Height and Weights      IBW: -88 kg  Body mass index is 28 36 kg/m²  Weight (last 2 days)     Date/Time   Weight    03/15/20 1930   87 1 (192 02)    03/15/20 1059   86 4 (190 48)                Nutrition       Diet Orders   (From admission, onward)             Start     Ordered    03/15/20 1928  Diet NPO  Diet effective now     Question Answer Comment   Diet Type NPO    RD to adjust diet per protocol?  Yes        03/15/20 1927                  Active Medications  Scheduled Meds:    Current Facility-Administered Medications:  amLODIPine 5 mg Oral Daily Mel Hall PA-C   cefTRIAXone 1,000 mg Intravenous Q24H Mel Hall PA-C   divalproex sodium 500 mg Oral Daily Mel Hall PA-C   docusate sodium 100 mg Oral BID Mel Hall PA-C   heparin (porcine) 5,000 Units Subcutaneous Q8H 1131 No  [facility] ANANYA Thomas   nystatin  Topical BID Mel Hall PA-C     Continuous Infusions:     PRN Meds:        Invasive Devices Review  Invasive Devices Peripheral Intravenous Line            Peripheral IV 03/15/20 Left Forearm less than 1 day    Peripheral IV 03/15/20 Right Wrist less than 1 day          Drain            Urethral Catheter Non-latex 66 days    Urethral Catheter Latex 16 Fr  less than 1 day              ---------------------------------------------------------------------------------------  Care Time Delivered:   No Critical Care time spent       [facility]      Portions of the record may have been created with voice recognition software  Occasional wrong word or "sound a like" substitutions may have occurred due to the inherent limitations of voice recognition software    Read the chart carefully and recognize, using context, where substitutions have occurred

## 2020-03-16 NOTE — PROGRESS NOTES
Pt presently NPO with encephalopathy  Elevated BUN, Creat, low eGFR  HD deemed inappropriate given advanced age and comorbidities  If oral diet is appropriate, recommend renal diet with phosphorus restriction and approximate 60 g protein restriction  If unable to advance diet, would not recommend PEG placement unless desired by pt/family  Can provide recommendations for temporary TF such as through NG tube if desired/necessary

## 2020-03-16 NOTE — ASSESSMENT & PLAN NOTE
· Patient was admitted to [facility] in 9/2019 with seizures and SAH s/p fall  · Patient remains aphasic at baseline  · Patient was started and maintained on Depakote  mg daily  · Check valproic acid level subtherapeutic  Administered loading dose of depakote  Recheck valproic acid in the morning

## 2020-03-16 NOTE — ASSESSMENT & PLAN NOTE
· Patient has history of urinary retention, frequent UTIs, and multiple barrera catheters placed  · Barrera changed in ED on 3/15/2020  · Cont flomax q hs  · Cont to monitor UOP q 1 hour  · Nephrology recs to continue manual expression of pus to avoid further obstruction of barrera

## 2020-03-16 NOTE — PLAN OF CARE
Problem: Potential for Falls  Goal: Patient will remain free of falls  Description  INTERVENTIONS:  - Assess patient frequently for physical needs  -  Identify cognitive and physical deficits and behaviors that affect risk of falls    -  [place] fall precautions as indicated by assessment   - Educate patient/family on patient safety including physical limitations  - Instruct patient to call for assistance with activity based on assessment  - Modify environment to reduce risk of injury  - Consider OT/PT consult to assist with strengthening/mobility  Outcome: Progressing

## 2020-03-16 NOTE — PROGRESS NOTES
Progress Note Jesus Gu 1/30/1924, 80 y o  male MRN: [id number]    Unit/Bed#: -01 Encounter: [id number]    Primary Care Provider: Saeed Hawk MD   Date and time admitted to hospital: 3/15/2020 10:47 AM        * Sepsis secondary to UTI  Assessment & Plan  · Patient had MRSA UTI in 10/2019 and [place] UTI 1/2020  · Received 3 L NSS in ER, cultures and lactate sent, will trend  · Patient started on Rocephin, but will add Vanco secondary to H/O MRSA  · 03/16 Urine culture results: >100,000 cfu/ml Pseudomonas aeruginosa, >100,000 cfu/ml Morganella morganii, >100,000 cfu/ml Enterococcus faecalis  · ABX changed to cefepime  Continue vanc  Sensitivity still pending  · Pharmacy consulted for vanc dosing    Encephalopathy  Assessment & Plan  · Likely due to toxic metabolic causes like UTI and uremia  · Patient aphasic at baseline  · CTH negative for acute process on admission  · Cont to monitor and neurochecks  · Avoid benzos  · Sleep hygiene    Hypernatremia  Assessment & Plan  · Sodium 143 on admission, now increased to 147 on am labs on 03/16  · Patient is currently NPO 2/2 mental status  · Fluid resuscitated for sepsis with approximately 3 L on admission  · Given 0 45% NSS x 2 liters on 03/16  · Trend sodium    Urinary retention  Assessment & Plan  · Patient has history of urinary retention, frequent UTIs, and multiple hoffmann catheters placed  · Hoffmann changed in ED on 3/15/2020  · Cont flomax q hs  · Cont to monitor UOP q 1 hour  · Nephrology recs to continue manual expression of pus to avoid further obstruction of hoffmann  FRANCOIS on CKD (chronic kidney disease) stage 3, GFR 30-59 ml/min ([facility])  Assessment & Plan  · Baseline Cr appears to be 1 2-1 3  · Hoffmann was not draining in ER and was replaced with 1 liter output  · CT A/P shows "moderate right and moderate to severe left hydroureteronephrosis identified without distal obstructive pathology identified   There is left perinephric stranding as well as stranding in the region of the left renal pelvis and proximal   Ureter "   · Cont to trend Cr now that obstructive uropathy resolved   · Urology spoke with ER team and did not recommend intervention for hydroureteronephrosis  · Nephrology consulted and following  · Not recommending HD due to age and comorbities    A-fib [facility])  Assessment & Plan  · Patient currently in sinus rhythm  · Not a candidate for [facility] per Cardiology notes as outpatient  · Not on rate/ rhythm controls agents prior to admission  Essential hypertension  Assessment & Plan  · Patient takes norvasc 5 mg daily at home  · Cont and monitor    Coronary artery disease involving native coronary artery of native heart without angina pectoris  Assessment & Plan  · Patient has bare metal stent placed 2012 and was seeing outpatient Cardiology  · No longer taking ASA per records  · Last echo 65% with grade 1 diastolic dysfunction    H/O SAH (subarachnoid hemorrhage) and Seizures 9/2019  Assessment & Plan  · Patient was admitted to [facility] in 9/2019 with seizures and SAH s/p fall  · Patient remains aphasic at baseline  · Patient was started and maintained on Depakote  mg daily  · Check valproic acid level subtherapeutic on admit at 18  Administered loading dose of depakote  · Recheck on 3/16 was 38  · Continue IV Valproate        ----------------------------------------------------------------------------------------  HPI/24hr events: No acute events overnight  Antibiotics were changed to cefepime, continue on vanc yesterday after results of urine culture received  Urine output remained stable       Disposition: Transfer to Med-Surg   Code Status: Level 3 - DNAR and DNI  ---------------------------------------------------------------------------------------  SUBJECTIVE    Review of Systems   Reason unable to perform ROS: patient mental status    ---------------------------------------------------------------------------------------  OBJECTIVE    Vitals Vitals:    20 0500 20 0600 20 0649 20 0700   BP:  148/61 160/77 160/77   BP Location:   Right arm    Pulse: 85 75 94 76   Resp: (!) 24 (!) 23 (!) 25 (!) 25   Temp:   97 8 °F (36 6 °C)    TempSrc:   Axillary    SpO2: 97% 96% 92% 91%   Weight: 81 3 kg (179 lb 3 7 oz)      Height:         Temp (24hrs), Av °F (36 7 °C), Min:97 8 °F (36 6 °C), Max:98 3 °F (36 8 °C)  Current: Temperature: 97 8 °F (36 6 °C)        SpO2: SpO2: 91 %, SpO2 Activity: SpO2 Activity: At Rest, SpO2 Device: O2 Device: None (Room air)  O2 Flow Rate (L/min): 3 L/min    Physical Exam   Constitutional: No distress  Appears stated age  No acute distress  HENT:   Head: Normocephalic and atraumatic  Mouth/Throat: No oropharyngeal exudate  Mucous membranes dry, Poor dentition  Eyes: Pupils are equal, round, and reactive to light  Conjunctivae and EOM are normal  Right eye exhibits no discharge  Left eye exhibits no discharge  No scleral icterus  Neck: Neck supple  No tracheal deviation present  Cardiovascular: Normal rate, regular rhythm, normal heart sounds and intact distal pulses  Exam reveals no friction rub  No murmur heard  Pulmonary/Chest: Effort normal and breath sounds normal  No respiratory distress  He has no wheezes  He has no rales  Abdominal: Soft  Bowel sounds are normal  He exhibits no distension  There is no tenderness  There is no guarding  Genitourinary:   Genitourinary Comments: Pus-like drainage noted from meatus, around barrera  Barrera draining dark cloudy urine with sediment  Musculoskeletal: He exhibits no edema  Wound to right arm, left upper thigh  Lymphadenopathy:     He has no cervical adenopathy  Neurological:   Opens eyes to voice  Does not follow commands  Non-verbal  Responds to pain  Skin: Skin is warm and dry  Capillary refill takes less than 2 seconds  He is not diaphoretic  No pallor  Nursing note and vitals reviewed        Invasive/non-invasive ventilation settings   Respiratory    Lab Data (Last 4 hours)    None         O2/Vent Data (Last 4 hours)    None                Laboratory and Diagnostics:  Results from last 7 days   Lab Units 03/16/20  0432 03/15/20  2116 03/15/20  1103   WBC Thousand/uL 8 93  --  9 60   HEMOGLOBIN g/dL 10 5*  --  11 3*   HEMATOCRIT % 31 8*  --  34 1*   PLATELETS Thousands/uL 150 149 174   BANDS PCT %  --   --  10*   MONO PCT %  --   --  16*     Results from last 7 days   Lab Units 03/17/20  0454 03/16/20  2344 03/16/20  1947 03/16/20  1638 03/16/20  1252 03/16/20  0432 03/15/20  2116  03/15/20  1103   SODIUM mmol/L 147* 147* 147* 137 148* 147* 146*   < > 143   POTASSIUM mmol/L 4 8 5 1 4 7 4 1 5 1 5 0 5 3   < > 5 6*   CHLORIDE mmol/L 113* 113* 112* 106 114* 113* 112*   < > 108   CO2 mmol/L 19* 20* 19* 17* 19* 18* 20*   < > 18*   ANION GAP mmol/L 15* 14* 16* 14* 15* 16* 14*   < > 17*   BUN mg/dL 181* 186* 180* 156* 182* 175* 172*   < > 181*   CREATININE mg/dL 6 87* 7 07* 7 32* 5 89* 7 40* 7 49* 7 65*   < > 8 24*   CALCIUM mg/dL 8 0* 8 5 8 2* 6 6* 8 0* 8 2* 7 7*   < > 8 1*   GLUCOSE RANDOM mg/dL 82 84 92 66 77 86 91   < > 97   ALT U/L  --   --   --   --   --  55  --   --  87*   AST U/L  --   --   --   --   --  28  --   --  52*   ALK PHOS U/L  --   --   --   --   --  105  --   --  131*   ALBUMIN g/dL  --   --   --   --   --  1 5*  --   --  1 8*   TOTAL BILIRUBIN mg/dL  --   --   --   --   --  0 40  --   --  0 55    < > = values in this interval not displayed       Results from last 7 days   Lab Units 03/16/20  0432   MAGNESIUM mg/dL 2 2   PHOSPHORUS mg/dL 5 4*           Results from last 7 days   Lab Units 03/15/20  1518 03/15/20  1103   TROPONIN I ng/mL 0 04 0 04     Results from last 7 days   Lab Units 03/15/20  1822 03/15/20  1103   LACTIC ACID mmol/L 1 5 2 0     ABG:  Results from last 7 days   Lab Units 03/15/20  1555   PH ART  7 439   PCO2 ART mm Hg 20 5*   PO2 ART mm Hg 109 8   HCO3 ART mmol/L 13 6*   BASE EXC ART mmol/L -8 7 ABG SOURCE  Radial, Left     VBG:  Results from last 7 days   Lab Units 03/15/20  1555   ABG SOURCE  Radial, Left           Micro  Results from last 7 days   Lab Units 03/15/20  1156 03/15/20  1119 03/15/20  1103   BLOOD CULTURE   --  No Growth at 24 hrs  Staphylococcus coagulase negative*   GRAM STAIN RESULT   --   --  Gram positive cocci in clusters*   URINE CULTURE  >100,000 cfu/ml   --   --        EKG: NSR  Imaging: no new imaging to review,     Intake and Output  I/O       03/15 0701 - 03/16 0700 03/16 0701 - 03/17 0700 03/17 0701 - 03/18 0700    I V  (mL/kg)  1830 (22 5)     IV Piggyback 3150 100     Total Intake(mL/kg) 3150 (36 2) 1930 (23 7)     Urine (mL/kg/hr) 3510 2475 (1 3)     Total Output 3510 2475     Net -360 -545                  Height and Weights   Height: 5' 10" (177 8 cm)  IBW: 73 kg  Body mass index is 25 72 kg/m²  Weight (last 2 days)     Date/Time   Weight    03/17/20 0500   81 3 (179 23)    03/16/20 0600   87 1 (192 02)    03/15/20 2000   87 1 (192 02)    03/15/20 1930   87 1 (192 02)    03/15/20 1059   86 4 (190 48)                Nutrition       Diet Orders   (From admission, onward)             Start     Ordered    03/15/20 1928  Diet NPO  Diet effective now     Question Answer Comment   Diet Type NPO    RD to adjust diet per protocol?  Yes        03/15/20 1927                  Active Medications  Scheduled Meds:  Current Facility-Administered Medications:  bisacodyl 10 mg Rectal Daily PRN MARC Hansen    cefepime 1,000 mg Intravenous D30I Eric Keys MD    heparin (porcine) 5,000 Units Subcutaneous Q8H 1131 No  [place] , PA-C    nystatin  Topical BID Mel Hall PA-C    valproate sodium 500 mg Intravenous Daily MARC Hansne Last Rate: Stopped (03/16/20 1042)   vancomycin 15 mg/kg Intravenous Once PRN Autumn Kilts, PA-C      Continuous Infusions:     PRN Meds:     bisacodyl 10 mg Daily PRN   vancomycin 15 mg/kg Once PRN       Invasive Devices Review  Invasive Devices     Peripheral Intravenous Line            Peripheral IV 03/15/20 Left Forearm 1 day    Peripheral IV 03/15/20 Right Wrist 1 day          Drain            Urethral Catheter Latex 16 Fr  1 day                --------------------------------------------------------------------------------------  Care Time Delivered:   No Critical Care time spent       MARC Parker      Portions of the record may have been created with voice recognition software  Occasional wrong word or "sound a like" substitutions may have occurred due to the inherent limitations of voice recognition software    Read the chart carefully and recognize, using context, where substitutions have occurred

## 2020-03-16 NOTE — ASSESSMENT & PLAN NOTE
· Patient currently in sinus rhythm  · Not a candidate for [facility] per Cardiology notes as outpatient  · Not on rate/ rhythm controls agents prior to admission

## 2020-03-17 LAB
ANION GAP SERPL CALCULATED.3IONS-SCNC: 14 MMOL/L (ref 4–13)
ANION GAP SERPL CALCULATED.3IONS-SCNC: 15 MMOL/L (ref 4–13)
ANION GAP SERPL CALCULATED.3IONS-SCNC: 17 MMOL/L (ref 4–13)
ATRIAL RATE: 101 BPM
BACTERIA UR CULT: NORMAL
BUN SERPL-MCNC: 181 MG/DL (ref 5–25)
BUN SERPL-MCNC: 184 MG/DL (ref 5–25)
BUN SERPL-MCNC: 185 MG/DL (ref 5–25)
BUN SERPL-MCNC: 186 MG/DL (ref 5–25)
BUN SERPL-MCNC: 189 MG/DL (ref 5–25)
CALCIUM SERPL-MCNC: 8 MG/DL (ref 8.3–10.1)
CALCIUM SERPL-MCNC: 8.4 MG/DL (ref 8.3–10.1)
CALCIUM SERPL-MCNC: 8.4 MG/DL (ref 8.3–10.1)
CALCIUM SERPL-MCNC: 8.5 MG/DL (ref 8.3–10.1)
CALCIUM SERPL-MCNC: 8.5 MG/DL (ref 8.3–10.1)
CHLORIDE SERPL-SCNC: 113 MMOL/L (ref 100–108)
CHLORIDE SERPL-SCNC: 113 MMOL/L (ref 100–108)
CHLORIDE SERPL-SCNC: 114 MMOL/L (ref 100–108)
CO2 SERPL-SCNC: 18 MMOL/L (ref 21–32)
CO2 SERPL-SCNC: 19 MMOL/L (ref 21–32)
CO2 SERPL-SCNC: 19 MMOL/L (ref 21–32)
CO2 SERPL-SCNC: 20 MMOL/L (ref 21–32)
CO2 SERPL-SCNC: 20 MMOL/L (ref 21–32)
CREAT SERPL-MCNC: 6.72 MG/DL (ref 0.6–1.3)
CREAT SERPL-MCNC: 6.79 MG/DL (ref 0.6–1.3)
CREAT SERPL-MCNC: 6.87 MG/DL (ref 0.6–1.3)
CREAT SERPL-MCNC: 7.02 MG/DL (ref 0.6–1.3)
CREAT SERPL-MCNC: 7.07 MG/DL (ref 0.6–1.3)
GFR SERPL CREATININE-BSD FRML MDRD: 6 ML/MIN/1.73SQ M
GLUCOSE SERPL-MCNC: 104 MG/DL (ref 65–140)
GLUCOSE SERPL-MCNC: 145 MG/DL (ref 65–140)
GLUCOSE SERPL-MCNC: 82 MG/DL (ref 65–140)
GLUCOSE SERPL-MCNC: 84 MG/DL (ref 65–140)
GLUCOSE SERPL-MCNC: 88 MG/DL (ref 65–140)
MAGNESIUM SERPL-MCNC: 2.6 MG/DL (ref 1.6–2.6)
P AXIS: 53 DEGREES
PHOSPHATE SERPL-MCNC: 7 MG/DL (ref 2.3–4.1)
POTASSIUM SERPL-SCNC: 4.8 MMOL/L (ref 3.5–5.3)
POTASSIUM SERPL-SCNC: 5.1 MMOL/L (ref 3.5–5.3)
POTASSIUM SERPL-SCNC: 5.1 MMOL/L (ref 3.5–5.3)
POTASSIUM SERPL-SCNC: 5.2 MMOL/L (ref 3.5–5.3)
POTASSIUM SERPL-SCNC: 5.3 MMOL/L (ref 3.5–5.3)
PR INTERVAL: 176 MS
QRS AXIS: -47 DEGREES
QRSD INTERVAL: 112 MS
QT INTERVAL: 350 MS
QTC INTERVAL: 453 MS
SODIUM SERPL-SCNC: 147 MMOL/L (ref 136–145)
SODIUM SERPL-SCNC: 147 MMOL/L (ref 136–145)
SODIUM SERPL-SCNC: 148 MMOL/L (ref 136–145)
SODIUM SERPL-SCNC: 149 MMOL/L (ref 136–145)
SODIUM SERPL-SCNC: 149 MMOL/L (ref 136–145)
T WAVE AXIS: -15 DEGREES
VANCOMYCIN SERPL-MCNC: 17.9 UG/ML
VENTRICULAR RATE: 101 BPM

## 2020-03-17 PROCEDURE — 80202 ASSAY OF VANCOMYCIN: CPT | Performed by: PHYSICIAN ASSISTANT

## 2020-03-17 PROCEDURE — 87086 URINE CULTURE/COLONY COUNT: CPT | Performed by: PHYSICIAN ASSISTANT

## 2020-03-17 PROCEDURE — 99233 SBSQ HOSP IP/OBS HIGH 50: CPT | Performed by: NURSE PRACTITIONER

## 2020-03-17 PROCEDURE — 84100 ASSAY OF PHOSPHORUS: CPT | Performed by: NURSE PRACTITIONER

## 2020-03-17 PROCEDURE — 80048 BASIC METABOLIC PNL TOTAL CA: CPT | Performed by: PHYSICIAN ASSISTANT

## 2020-03-17 PROCEDURE — 83735 ASSAY OF MAGNESIUM: CPT | Performed by: NURSE PRACTITIONER

## 2020-03-17 PROCEDURE — NC001 PR NO CHARGE: Performed by: PHYSICIAN ASSISTANT

## 2020-03-17 PROCEDURE — 82272 OCCULT BLD FECES 1-3 TESTS: CPT | Performed by: NURSE PRACTITIONER

## 2020-03-17 PROCEDURE — 93010 ELECTROCARDIOGRAM REPORT: CPT | Performed by: INTERNAL MEDICINE

## 2020-03-17 RX ORDER — SODIUM CHLORIDE, SODIUM GLUCONATE, SODIUM ACETATE, POTASSIUM CHLORIDE, MAGNESIUM CHLORIDE, SODIUM PHOSPHATE, DIBASIC, AND POTASSIUM PHOSPHATE .53; .5; .37; .037; .03; .012; .00082 G/100ML; G/100ML; G/100ML; G/100ML; G/100ML; G/100ML; G/100ML
500 INJECTION, SOLUTION INTRAVENOUS ONCE
Status: COMPLETED | OUTPATIENT
Start: 2020-03-17 | End: 2020-03-17

## 2020-03-17 RX ORDER — VANCOMYCIN HYDROCHLORIDE 1 G/200ML
12.5 INJECTION, SOLUTION INTRAVENOUS ONCE AS NEEDED
Status: COMPLETED | OUTPATIENT
Start: 2020-03-17 | End: 2020-03-17

## 2020-03-17 RX ORDER — VANCOMYCIN HYDROCHLORIDE 1 G/200ML
12.5 INJECTION, SOLUTION INTRAVENOUS ONCE AS NEEDED
Status: DISCONTINUED | OUTPATIENT
Start: 2020-03-18 | End: 2020-03-18

## 2020-03-17 RX ORDER — SODIUM CHLORIDE 450 MG/100ML
100 INJECTION, SOLUTION INTRAVENOUS CONTINUOUS
Status: DISCONTINUED | OUTPATIENT
Start: 2020-03-17 | End: 2020-03-17

## 2020-03-17 RX ORDER — LABETALOL 20 MG/4 ML (5 MG/ML) INTRAVENOUS SYRINGE
10 EVERY 4 HOURS PRN
Status: DISCONTINUED | OUTPATIENT
Start: 2020-03-17 | End: 2020-03-18

## 2020-03-17 RX ORDER — DEXTROSE MONOHYDRATE 50 MG/ML
75 INJECTION, SOLUTION INTRAVENOUS CONTINUOUS
Status: DISCONTINUED | OUTPATIENT
Start: 2020-03-17 | End: 2020-03-18

## 2020-03-17 RX ADMIN — HEPARIN SODIUM 5000 UNITS: 5000 INJECTION INTRAVENOUS; SUBCUTANEOUS at 21:48

## 2020-03-17 RX ADMIN — CEFEPIME HYDROCHLORIDE 1000 MG: 1 INJECTION, SOLUTION INTRAVENOUS at 12:48

## 2020-03-17 RX ADMIN — HEPARIN SODIUM 5000 UNITS: 5000 INJECTION INTRAVENOUS; SUBCUTANEOUS at 13:48

## 2020-03-17 RX ADMIN — VALPROATE SODIUM 500 MG: 100 INJECTION, SOLUTION INTRAVENOUS at 10:01

## 2020-03-17 RX ADMIN — NYSTATIN: 100000 POWDER TOPICAL at 17:58

## 2020-03-17 RX ADMIN — VANCOMYCIN HYDROCHLORIDE 1000 MG: 1 INJECTION, SOLUTION INTRAVENOUS at 18:14

## 2020-03-17 RX ADMIN — LABETALOL 20 MG/4 ML (5 MG/ML) INTRAVENOUS SYRINGE 10 MG: at 23:18

## 2020-03-17 RX ADMIN — HEPARIN SODIUM 5000 UNITS: 5000 INJECTION INTRAVENOUS; SUBCUTANEOUS at 05:14

## 2020-03-17 RX ADMIN — LABETALOL 20 MG/4 ML (5 MG/ML) INTRAVENOUS SYRINGE 10 MG: at 16:31

## 2020-03-17 RX ADMIN — SODIUM CHLORIDE 100 ML/HR: 0.45 INJECTION, SOLUTION INTRAVENOUS at 05:13

## 2020-03-17 RX ADMIN — NYSTATIN 1 APPLICATION: 100000 POWDER TOPICAL at 10:00

## 2020-03-17 RX ADMIN — DEXTROSE 50 ML/HR: 5 SOLUTION INTRAVENOUS at 13:45

## 2020-03-17 RX ADMIN — SODIUM CHLORIDE, SODIUM GLUCONATE, SODIUM ACETATE, POTASSIUM CHLORIDE, MAGNESIUM CHLORIDE, SODIUM PHOSPHATE, DIBASIC, AND POTASSIUM PHOSPHATE 500 ML: .53; .5; .37; .037; .03; .012; .00082 INJECTION, SOLUTION INTRAVENOUS at 11:43

## 2020-03-17 NOTE — ASSESSMENT & PLAN NOTE
· Patient currently in sinus rhythm  · Not a candidate for [facility] per Cardiology notes as outpatient  · Not on rate/ rhythm controls agents prior to admission

## 2020-03-17 NOTE — PLAN OF CARE
Problem: Potential for Falls  Goal: Patient will remain free of falls  Description  INTERVENTIONS:  - Assess patient frequently for physical needs  -  Identify cognitive and physical deficits and behaviors that affect risk of falls    -  [place] fall precautions as indicated by assessment   - Educate patient/family on patient safety including physical limitations  - Instruct patient to call for assistance with activity based on assessment  - Modify environment to reduce risk of injury  - Consider OT/PT consult to assist with strengthening/mobility  Outcome: Progressing

## 2020-03-17 NOTE — ASSESSMENT & PLAN NOTE
· Likely due to toxic metabolic causes like UTI and uremia  · Patient aphasic at baseline  · [facility]  · Cont to monitor and neurochecks  · Avoid benzos  · Sleep hygiene

## 2020-03-17 NOTE — ACP (ADVANCE CARE PLANNING)
Johanny TRAN and I spoke with patient's son, Kiki Peguero, and daughter, Berenice Mukherjee, regarding worsening BUN and continued uremia  We discussed poor prognosis given lack of recovery of kidneys and patient not a good candidate for dialysis  Children stated their father would not have wanted dialysis and currently has poor quality of life  Given current renal status, they are interested in discussing hospice options with Case Management  They would like to have a place for him to go, preferably [facility] where their mother resides, before initiating comfort care  Therefore, continue current treatment and Level 3 code status   CM aware of the family's request and we will evaluate in am

## 2020-03-17 NOTE — PROGRESS NOTES
NEPHROLOGY PROGRESS NOTE   Lulu Moreau 80 y o  male MRN: [id number]  Unit/Bed#: -01 Encounter: [id number]    Assessment/Plan:    1  Acute kidney injury:  Present on admission  ? Upon review the medical record, baseline creatinine appears to be around 1 5-1 9 mg/dL  ? Creatinine upon admission 8 24 mg/dL -> 7 02 mg/dL today  Etiology ATN  He is s/p catheter exchange in ED  He is non-oliguric, 2 5 liters UOP yesterday  ? Goals of care discussion held via telephone call with patient's son [facility] and Scott Jauregui PA-C  The patient's condition and prognosis were discussed  His uremia and hypernatremia are worsening  He is encephalopathic and not able to eat and drink on his own  He is not a candidate for hemodialysis due to risk factors stemming from his age and co morbidities  The necessity for a nasogastric tube for enteral feeding was discussed if decision was made to pursue further treatment  Comfort care and end-of-life measures were also discussed in detail and offered  The patient's son is going to talk to the patient's other family members and then further recommendations will be forthcoming  For now, will start patient on D5W @ 50 mL/hr  Continue trending end-points  ? Avoid nephrotoxins  ? Trend BMPs frequently, strict I&O, daily weight, adjust meds to EGFR  2  Stage 3 chronic kidney disease  ? Baseline as above  3  Metabolic acidosis with respiratory alkalosis  ? Improving  He is s/p 75 mEq of bicarbonate  His bicarb today is 19 mmol/L  4  Hypernatremia  ? Sodium uptrending due to lack of free water  Will start on D5W @ 50mL/hr    5   Sepsis due to urosepsis  ? 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 Catheter was blocked upon admission and this was exchanged  It is patent and draining now  7  Metabolic Encephalopathy  ? Feel this is second to uremia and chronic problems   mg/dL   Not candidate for hemodialysis  No significant hemoglobin drop to suggest acute bleeding to explain alternative etiology for elevated BUN  Continue to monitor  Will check FOBT  ROS  Unable to obtain due to encephalopathy  Historical Information   Past Medical History:   Diagnosis Date    Anxiety     Arthritis     Athscl heart disease of native coronary artery w/o ang pctrs     Coronary angioplasty status     HTN (hypertension)     Hx of echocardiogram 04/06/2012    EF 0 65, Normal LV function   Hyperlipidemia     Neurogenic bladder     Renal failure (ARF), acute on chronic (HCC)     Renal failure (ARF), acute on chronic (HCC)     Right bundle branch block     Ventricular premature depolarization      Past Surgical History:   Procedure Laterality Date    CARDIAC CATHETERIZATION  04/04/2012    Single vessel CAD;  Successful bare metal stent placed in the distal RCA     Social History   Social History     Substance and Sexual Activity   Alcohol Use Not Currently     Social History     Substance and Sexual Activity   Drug Use Never     Social History     Tobacco Use   Smoking Status Never Smoker   Smokeless Tobacco Never Used       Family History:   Family History   Problem Relation Age of Onset    No Known Problems Family         noncontributory       Medications:  Pertinent medications were reviewed    Current Facility-Administered Medications:  bisacodyl 10 mg Rectal Daily PRN MARC Hansen    cefepime 1,000 mg Intravenous O30H Addis Stubbs MD Last Rate: Stopped (03/17/20 1318)   dextrose 50 mL/hr Intravenous Continuous Jeanell NetSANGNP Last Rate: 50 mL/hr (03/17/20 1345)   heparin (porcine) 5,000 Units Subcutaneous [facility] Aleena Saleem Jr, PA-C    nystatin  Topical BID Mel Hall PA-C    valproate sodium 500 mg Intravenous Daily MARC Hansen Last Rate: Stopped (03/17/20 1101)   vancomycin 15 mg/kg Intravenous Once PRN Suzanne Renee PA-C          Allergies   Allergen Reactions    Sulfa Antibiotics          Vitals:   BP (!) 183/79   Pulse 84   Temp 98 °F (36 7 °C) (Axillary)   Resp (!) 25   Ht 5' 10" (1 778 m)   Wt 81 3 kg (179 lb 3 7 oz)   SpO2 (!) 84%   BMI 25 72 kg/m²   Body mass index is 25 72 kg/m²  SpO2: (!) 84 %,   SpO2 Activity: At Rest,   O2 Device: None (Room air)      Intake/Output Summary (Last 24 hours) at 3/17/2020 1459  Last data filed at 3/17/2020 1318  Gross per 24 hour   Intake 2000 ml   Output 2275 ml   Net -275 ml     Invasive Devices     Peripheral Intravenous Line            Peripheral IV 03/15/20 Left Forearm 2 days    Peripheral IV 03/15/20 Right Wrist 1 day          Drain            Urethral Catheter Latex 16 Fr  2 days                Physical Exam  General: chronically ill appearing male conscious, cooperative, in some distress  Eyes: conjunctivae pink, anicteric sclerae  ENT: lips and mucous membranes very dry  Neck: supple, no JVD, no masses  Chest: diminished breath sounds bilateral, no crackles, ronchus or wheezings  CVS: S1 & S2, normal rate, regular rhythm  Abdomen: soft, non-tender, non-distended, normoactive bowel sounds  Extremities: no edema of both legs  Skin: no rash  Neuro: lethargic      Diagnostic Data:  Lab: I have personally reviewed pertinent lab results  ,   CBC:  Results from last 7 days   Lab Units 03/16/20  0432   WBC Thousand/uL 8 93   HEMOGLOBIN g/dL 10 5*   HEMATOCRIT % 31 8*   PLATELETS Thousands/uL 150      CMP: Lab Results   Component Value Date    SODIUM 148 (H) 03/17/2020    K 5 3 03/17/2020     (H) 03/17/2020    CO2 19 (L) 03/17/2020     (H) 03/17/2020    CREATININE 7 02 (H) 03/17/2020    CALCIUM 8 5 03/17/2020    EGFR 6 03/17/2020   ,   PT/INR: No results found for: PT, INR,   Magnesium: No components found for: MAG,  Phosphorous: No results found for: PHOS    Microbiology:  @LABRCNTIP,(urinecx:7)@        MARC Green    Portions of the record may have been created with voice recognition software  Occasional wrong word or "sound a like" substitutions may have occurred due to the inherent limitations of voice recognition software  Read the chart carefully and recognize, using context, where substitutions have occurred

## 2020-03-17 NOTE — PLAN OF CARE
Problem: Potential for Falls  Goal: Patient will remain free of falls  Description  INTERVENTIONS:  - Assess patient frequently for physical needs  -  Identify cognitive and physical deficits and behaviors that affect risk of falls    -  [place] fall precautions as indicated by assessment   - Educate patient/family on patient safety including physical limitations  - Instruct patient to call for assistance with activity based on assessment  - Modify environment to reduce risk of injury  - Consider OT/PT consult to assist with strengthening/mobility  Outcome: Progressing     Problem: PAIN - ADULT  Goal: Verbalizes/displays adequate comfort level or baseline comfort level  Description  Interventions:  - Encourage patient to monitor pain and request assistance  - Assess pain using appropriate pain scale  - Administer analgesics based on type and severity of pain and evaluate response  - Implement non-pharmacological measures as appropriate and evaluate response  - Consider cultural and social influences on pain and pain management  - Notify physician/advanced practitioner if interventions unsuccessful or patient reports new pain  Outcome: Progressing     Problem: INFECTION - ADULT  Goal: Absence or prevention of progression during hospitalization  Description  INTERVENTIONS:  - Assess and monitor for signs and symptoms of infection  - Monitor lab/diagnostic results  - Monitor all insertion sites, i e  indwelling lines, tubes, and drains  - Monitor endotracheal if appropriate and nasal secretions for changes in amount and color  - [place] appropriate cooling/warming therapies per order  - Administer medications as ordered  - Instruct and encourage patient and family to use good hand hygiene technique  - Identify and instruct in appropriate isolation precautions for identified infection/condition  Outcome: Progressing  Goal: Absence of fever/infection during neutropenic period  Description  INTERVENTIONS:  - Monitor WBC    Outcome: Progressing     Problem: SAFETY ADULT  Goal: Maintain or return to baseline ADL function  Description  INTERVENTIONS:  -  Assess patient's ability to carry out ADLs; assess patient's baseline for ADL function and identify physical deficits which impact ability to perform ADLs (bathing, care of mouth/teeth, toileting, grooming, dressing, etc )  - Assess/evaluate cause of self-care deficits   - Assess range of motion  - Assess patient's mobility; develop plan if impaired  - Assess patient's need for assistive devices and provide as appropriate  - Encourage maximum independence but intervene and supervise when necessary  - Involve family in performance of ADLs  - Assess for home care needs following discharge   - Consider OT consult to assist with ADL evaluation and planning for discharge  - Provide patient education as appropriate  Outcome: Progressing  Goal: Maintain or return mobility status to optimal level  Description  INTERVENTIONS:  - Assess patient's baseline mobility status (ambulation, transfers, stairs, etc )    - Identify cognitive and physical deficits and behaviors that affect mobility  - Identify mobility aids required to assist with transfers and/or ambulation (gait belt, sit-to-stand, lift, walker, cane, etc )  - [place] fall precautions as indicated by assessment  - Record patient progress and toleration of activity level on Mobility SBAR; progress patient to next Phase/Stage  - Instruct patient to call for assistance with activity based on assessment  - Consider rehabilitation consult to assist with strengthening/weightbearing, etc   Outcome: Progressing     Problem: DISCHARGE PLANNING  Goal: Discharge to home or other facility with appropriate resources  Description  INTERVENTIONS:  - Identify barriers to discharge w/patient and caregiver  - Arrange for needed discharge resources and transportation as appropriate  - Identify discharge learning needs (meds, wound care, etc )  - Arrange for interpretive services to assist at discharge as needed  - Refer to Case Management Department for coordinating discharge planning if the patient needs post-hospital services based on physician/advanced practitioner order or complex needs related to functional status, cognitive ability, or social support system  Outcome: Progressing     Problem: Knowledge Deficit  Goal: Patient/family/caregiver demonstrates understanding of disease process, treatment plan, medications, and discharge instructions  Description  Complete learning assessment and assess knowledge base  Interventions:  - Provide teaching at level of understanding  - Provide teaching via preferred learning methods  Outcome: Progressing     Problem: Prexisting or High Potential for Compromised Skin Integrity  Goal: Skin integrity is maintained or improved  Description  INTERVENTIONS:  - Identify patients at risk for skin breakdown  - Assess and monitor skin integrity  - Assess and monitor nutrition and hydration status  - Monitor labs   - Assess for incontinence   - Turn and reposition patient  - Assist with mobility/ambulation  - Relieve pressure over bony prominences  - Avoid friction and shearing  - Provide appropriate hygiene as needed including keeping skin clean and dry  - Evaluate need for skin moisturizer/barrier cream  - Collaborate with interdisciplinary team   - Patient/family teaching  - Consider wound care consult   Outcome: Progressing     Problem: Nutrition/Hydration-ADULT  Goal: Nutrient/Hydration intake appropriate for improving, restoring or maintaining nutritional needs  Description  Monitor and assess patient's nutrition/hydration status for malnutrition  Collaborate with interdisciplinary team and initiate plan and interventions as ordered  Monitor patient's weight and dietary intake as ordered or per policy  Utilize nutrition screening tool and intervene as necessary   Determine patient's food preferences and provide high-protein, high-caloric foods as appropriate       INTERVENTIONS:  - Monitor oral intake, urinary output, labs, and treatment plans  - Assess nutrition and hydration status and recommend course of action  - Evaluate amount of meals eaten  - Assist patient with eating if necessary   - Allow adequate time for meals  - Recommend/ encourage appropriate diets, oral nutritional supplements, and vitamin/mineral supplements  - Order, calculate, and assess calorie counts as needed  - Recommend, monitor, and adjust tube feedings and TPN/PPN based on assessed needs  - Assess need for intravenous fluids  - Provide specific nutrition/hydration education as appropriate  - Include patient/family/caregiver in decisions related to nutrition  Outcome: Progressing

## 2020-03-17 NOTE — ASSESSMENT & PLAN NOTE
· Sodium 143 on admission, now increased to 147 on am labs on 03/17  · Patient is currently NPO 2/2 mental status  · Fluid resuscitated for sepsis with approximately 3 L on admission  · Received 500 cc bolus of isoloyte 3/15 and then Renal started D5W @ 50 cc/hr   · Trend sodium

## 2020-03-17 NOTE — ASSESSMENT & PLAN NOTE
· Patient had MRSA UTI in 10/2019 and [place] UTI 1/2020  · Received 3 L NSS in ER, cultures and lactate sent, will trend  · Patient started on Rocephin, but will add Vanco secondary to H/O MRSA  · 03/16 Urine culture results: >100,000 cfu/ml Pseudomonas aeruginosa, >100,000 cfu/ml Morganella morganii, >100,000 cfu/ml Enterococcus faecalis  · ABX changed to cefepime  Continue vanc  Urine cx read changed to contaminant from indwelling barrera  · Repeat urine culture sent 3/17    · Pharmacy consulted for vanc dosing

## 2020-03-17 NOTE — PROGRESS NOTES
Vancomycin Assessment      Dora Barrera is a 80 y o  male who is currently receiving vancomycin Pulse-dosing - 1750mg IV once (20mg/kg) for other, urinary tract infection sepsis secondary to UTI   Relevant clinical data and objective history reviewed:  Creatinine   Date Value Ref Range Status   03/17/2020 6 72 (H) 0 60 - 1 30 mg/dL Final     Comment:     Standardized to IDMS reference method   03/17/2020 7 02 (H) 0 60 - 1 30 mg/dL Final     Comment:     Standardized to IDMS reference method   03/17/2020 6 87 (H) 0 60 - 1 30 mg/dL Final     Comment:     Standardized to IDMS reference method   12/02/2014 0 97 0 60 - 1 30 mg/dL Final     Comment:     Standardized to IDMS reference method   02/18/2013 0 96  Final     Vancomycin Rm   Date Value Ref Range Status   03/17/2020 17 9 ug/mL Final     /75 (BP Location: Right arm)   Pulse 79   Temp 97 6 °F (36 4 °C) (Axillary)   Resp (!) 24   Ht 5' 10" (1 778 m)   Wt 81 3 kg (179 lb 3 7 oz)   SpO2 91%   BMI 25 72 kg/m²   I/O last 3 completed shifts: In: 2030 [I V :1830; IV Piggyback:200]  Out: 8280 [Urine:3235]  Lab Results   Component Value Date/Time     (H) 03/17/2020 04:59 PM    BUN 16 12/02/2014 02:37 AM    WBC 8 93 03/16/2020 04:32 AM    WBC 7 20 12/02/2014 02:37 AM    HGB 10 5 (L) 03/16/2020 04:32 AM    HGB 15 7 12/02/2014 02:37 AM    HCT 31 8 (L) 03/16/2020 04:32 AM    HCT 44 6 12/02/2014 02:37 AM    MCV 91 03/16/2020 04:32 AM    MCV 90 12/02/2014 02:37 AM     03/16/2020 04:32 AM     12/02/2014 02:37 AM     Temp Readings from Last 3 Encounters:   03/17/20 97 6 °F (36 4 °C) (Axillary)   01/09/20 97 6 °F (36 4 °C) (Oral)   10/12/19 98 3 °F (36 8 °C)     Vancomycin Days of Therapy: 3    Assessment/Plan  The patient is currently on vancomycin utilizing pulse dosing  Baseline risks associated with therapy include: pre-existing renal impairment, concomitant nephrotoxic medications and advanced age    The patient is on  per protocol pulse-dosing and received:  03/15: 1750mg IV ONCE (20mg/kg)   03/16: Random trough 13 8; so re-dose 10-15mg/kg when trough <20; pt received 1000mg IV once  03/17: Random trough 17 9; will re-dose 10-15mg/kg when trough < 20; pt will receive 1000mg IV once  A goal of 15-20 (appropriate for most indications) ; therefore, after clinical evaluation pt will receieve 1000mg IV once on 03/17 @ 1800 with random trough schedule for 3/18 1730  Will follow up with urine sensitivities/cultures  Pharmacy will continue to follow closely for s/sx of nephrotoxicity, infusion reactions and appropriateness of therapy  BMP and CBC will be ordered per protocol  Plan for trough as patient approaches steady state, prior to the other  dose at approximately 1730 on 03/18/20  Pharmacy will continue to follow the patients culture results and clinical progress daily      Will Rollins, Pharmacist

## 2020-03-17 NOTE — ASSESSMENT & PLAN NOTE
· Baseline Cr appears to be 1 2-1 3  · Hoffmann was not draining on admission and was replaced in ER with 1 liter output  · CT A/P shows "moderate right and moderate to severe left hydroureteronephrosis identified without distal obstructive pathology identified  There is left perinephric stranding as well as stranding in the region of the left renal pelvis and proximal   Ureter "   · Cont to trend Cr now that obstructive uropathy resolved   · Urology spoke with ER team and did not recommend intervention for hydroureteronephrosis  · Nephrology consulted and following  · Not recommending HD due to age and comorbidities  · Add D5W @ 50 cc/hr given hypernatremia  · Goals of care discussion had with children given worsening worsening ATN  · They do not want feeding tube  · They would like hospice consult with likely transition to comfort care tomorrow

## 2020-03-17 NOTE — TREATMENT PLAN
Treatment Plan - Urology   Lexis Gins 80 y o  male MRN: [id number]  Unit/Bed#: -01 Encounter: [id number]    Treatment Plan:  Patient admitted with creatinine of 8, Hoffmann catheter was blocked, was removed replaced with 3 5 L output of urine  Creatinine continues to trend down, albeit slowly  Good urine output with Hoffmann catheter draining without issue  Follow-up with Dr Montes De Oca Husbands as an outpatient  No urgent urologic intervention or indication for transfer at this time  We will remain available       Judith Wick PA-C  Date: 3/17/2020 Time: 9:31 AM

## 2020-03-17 NOTE — CASE MANAGEMENT
Medical team informed me that family is interested in changing goals of care to [place] and would like patient to go back to [facility]  CM will discuss this with family in the AM and proceed to dc in the AM

## 2020-03-17 NOTE — ASSESSMENT & PLAN NOTE
· Patient was admitted to [facility] in 9/2019 with seizures and SAH s/p fall  · Patient remains aphasic at baseline  · Patient was started and maintained on Depakote  mg daily  · Check valproic acid level subtherapeutic on admit at 18  Administered loading dose of depakote     · Recheck on 3/16 was 38  · Continue IV Valproate

## 2020-03-18 VITALS
HEIGHT: 70 IN | OXYGEN SATURATION: 98 % | SYSTOLIC BLOOD PRESSURE: 186 MMHG | TEMPERATURE: 97.4 F | HEART RATE: 64 BPM | BODY MASS INDEX: 25 KG/M2 | WEIGHT: 174.6 LBS | DIASTOLIC BLOOD PRESSURE: 84 MMHG | RESPIRATION RATE: 21 BRPM

## 2020-03-18 PROBLEM — R19.5 FECAL OCCULT BLOOD TEST POSITIVE: Status: ACTIVE | Noted: 2020-03-18

## 2020-03-18 PROBLEM — R19.5 FECAL OCCULT BLOOD TEST POSITIVE: Status: RESOLVED | Noted: 2020-03-18 | Resolved: 2020-03-18

## 2020-03-18 LAB
ALBUMIN SERPL BCP-MCNC: 1.5 G/DL (ref 3.5–5)
ALP SERPL-CCNC: 89 U/L (ref 46–116)
ALT SERPL W P-5'-P-CCNC: 30 U/L (ref 12–78)
ANION GAP SERPL CALCULATED.3IONS-SCNC: 12 MMOL/L (ref 4–13)
ANION GAP SERPL CALCULATED.3IONS-SCNC: 17 MMOL/L (ref 4–13)
AST SERPL W P-5'-P-CCNC: 16 U/L (ref 5–45)
BACTERIA BLD CULT: ABNORMAL
BACTERIA UR CULT: NORMAL
BILIRUB DIRECT SERPL-MCNC: 0.13 MG/DL (ref 0–0.2)
BILIRUB SERPL-MCNC: 0.31 MG/DL (ref 0.2–1)
BUN SERPL-MCNC: 174 MG/DL (ref 5–25)
BUN SERPL-MCNC: 182 MG/DL (ref 5–25)
CALCIUM SERPL-MCNC: 7.5 MG/DL (ref 8.3–10.1)
CALCIUM SERPL-MCNC: 8.4 MG/DL (ref 8.3–10.1)
CHLORIDE SERPL-SCNC: 108 MMOL/L (ref 100–108)
CHLORIDE SERPL-SCNC: 114 MMOL/L (ref 100–108)
CO2 SERPL-SCNC: 18 MMOL/L (ref 21–32)
CO2 SERPL-SCNC: 21 MMOL/L (ref 21–32)
CREAT SERPL-MCNC: 6.27 MG/DL (ref 0.6–1.3)
CREAT SERPL-MCNC: 6.41 MG/DL (ref 0.6–1.3)
ERYTHROCYTE [DISTWIDTH] IN BLOOD BY AUTOMATED COUNT: 14.5 % (ref 11.6–15.1)
GFR SERPL CREATININE-BSD FRML MDRD: 7 ML/MIN/1.73SQ M
GFR SERPL CREATININE-BSD FRML MDRD: 7 ML/MIN/1.73SQ M
GLUCOSE SERPL-MCNC: 147 MG/DL (ref 65–140)
GLUCOSE SERPL-MCNC: 408 MG/DL (ref 65–140)
GRAM STN SPEC: ABNORMAL
HCT VFR BLD AUTO: 36 % (ref 36.5–49.3)
HGB BLD-MCNC: 11.7 G/DL (ref 12–17)
MCH RBC QN AUTO: 30.2 PG (ref 26.8–34.3)
MCHC RBC AUTO-ENTMCNC: 32.5 G/DL (ref 31.4–37.4)
MCV RBC AUTO: 93 FL (ref 82–98)
PLATELET # BLD AUTO: 157 THOUSANDS/UL (ref 149–390)
PMV BLD AUTO: 10.9 FL (ref 8.9–12.7)
POTASSIUM SERPL-SCNC: 4.8 MMOL/L (ref 3.5–5.3)
POTASSIUM SERPL-SCNC: 5.1 MMOL/L (ref 3.5–5.3)
PROT SERPL-MCNC: 5.7 G/DL (ref 6.4–8.2)
RBC # BLD AUTO: 3.87 MILLION/UL (ref 3.88–5.62)
SODIUM SERPL-SCNC: 141 MMOL/L (ref 136–145)
SODIUM SERPL-SCNC: 149 MMOL/L (ref 136–145)
WBC # BLD AUTO: 12.12 THOUSAND/UL (ref 4.31–10.16)

## 2020-03-18 PROCEDURE — 80048 BASIC METABOLIC PNL TOTAL CA: CPT | Performed by: PHYSICIAN ASSISTANT

## 2020-03-18 PROCEDURE — 85027 COMPLETE CBC AUTOMATED: CPT | Performed by: INTERNAL MEDICINE

## 2020-03-18 PROCEDURE — NC001 PR NO CHARGE: Performed by: NURSE PRACTITIONER

## 2020-03-18 PROCEDURE — 80076 HEPATIC FUNCTION PANEL: CPT | Performed by: INTERNAL MEDICINE

## 2020-03-18 PROCEDURE — 99233 SBSQ HOSP IP/OBS HIGH 50: CPT | Performed by: INTERNAL MEDICINE

## 2020-03-18 PROCEDURE — 80048 BASIC METABOLIC PNL TOTAL CA: CPT | Performed by: INTERNAL MEDICINE

## 2020-03-18 RX ORDER — OXYCODONE HCL 5 MG/5 ML
5 SOLUTION, ORAL ORAL
Qty: 400 ML | Refills: 0 | Status: SHIPPED | OUTPATIENT
Start: 2020-03-18 | End: 2020-03-28

## 2020-03-18 RX ORDER — GLYCOPYRROLATE 0.2 MG/ML
0.2 INJECTION INTRAMUSCULAR; INTRAVENOUS
Status: DISCONTINUED | OUTPATIENT
Start: 2020-03-18 | End: 2020-03-18 | Stop reason: HOSPADM

## 2020-03-18 RX ORDER — LORAZEPAM 2 MG/ML
0.5 INJECTION INTRAMUSCULAR
Status: DISCONTINUED | OUTPATIENT
Start: 2020-03-18 | End: 2020-03-18 | Stop reason: HOSPADM

## 2020-03-18 RX ADMIN — DEXTROSE 75 ML/HR: 5 SOLUTION INTRAVENOUS at 07:17

## 2020-03-18 RX ADMIN — GLYCOPYRROLATE 0.2 MG: 0.2 INJECTION, SOLUTION INTRAMUSCULAR; INTRAVENOUS at 12:56

## 2020-03-18 RX ADMIN — CEFEPIME HYDROCHLORIDE 1000 MG: 1 INJECTION, SOLUTION INTRAVENOUS at 11:29

## 2020-03-18 RX ADMIN — VALPROATE SODIUM 500 MG: 100 INJECTION, SOLUTION INTRAVENOUS at 09:22

## 2020-03-18 RX ADMIN — LABETALOL 20 MG/4 ML (5 MG/ML) INTRAVENOUS SYRINGE 10 MG: at 06:13

## 2020-03-18 RX ADMIN — NYSTATIN: 100000 POWDER TOPICAL at 09:21

## 2020-03-18 RX ADMIN — HEPARIN SODIUM 5000 UNITS: 5000 INJECTION INTRAVENOUS; SUBCUTANEOUS at 05:39

## 2020-03-18 NOTE — ASSESSMENT & PLAN NOTE
· Patient currently in sinus rhythm  · Not a candidate for [facility] per Cardiology notes as outpatient  · Not on rate/ rhythm controls agents prior to admission    · Transitioned to comfort care-Stop cardiac monitor

## 2020-03-18 NOTE — ASSESSMENT & PLAN NOTE
· Patient had MRSA UTI in 10/2019 and [place] UTI 1/2020  · Received 3 L NSS in ER, cultures and lactate sent, will trend  · Patient started on Rocephin, but will add Vanco secondary to H/O MRSA  · 03/16 Urine culture results: >100,000 cfu/ml Pseudomonas aeruginosa, >100,000 cfu/ml Morganella morganii, >100,000 cfu/ml Enterococcus faecalis  · ABX changed to cefepime  Continue vanc  Urine cx read changed to contaminant from indwelling barrera  · Repeat urine culture sent 3/17  · Family wishes to transition to comfort care  Will discontinue abx  Maintain chronic barrera catheter for comfort

## 2020-03-18 NOTE — CONSULTS
The patient,s Vancomycin therapy has been discontinued  Thank you for this consult  Pharmacy will sign-off now

## 2020-03-18 NOTE — DISCHARGE SUMMARY
Discharge- Avelina Love 1/30/1924, 80 y o  male MRN: [id number]    Unit/Bed#: -01 Encounter: [id number]    Primary Care Provider: Keira Okeefe MD   Date and time admitted to hospital: 3/15/2020 10:47 AM        * Sepsis secondary to UTI  Assessment & Plan  · Patient had MRSA UTI in 10/2019 and Serratia UTI 1/2020  · Received 3 L NSS in ER, cultures and lactate sent, will trend  · Patient started on Rocephin, but will add Vanco secondary to H/O MRSA  · 03/16 Urine culture results: >100,000 cfu/ml Pseudomonas aeruginosa, >100,000 cfu/ml Morganella morganii, >100,000 cfu/ml Enterococcus faecalis  · ABX changed to cefepime  Continue vanc  Urine cx read changed to contaminant from indwelling barrera  · Repeat urine culture sent 3/17  · Family wishes to transition to comfort care  Will discontinue abx  Maintain chronic barrera catheter for comfort  Encephalopathy  Assessment & Plan  · Likely due to toxic metabolic causes like UTI and uremia  · Patient aphasic at baseline  · CTH negative for acute process on admission  · Avoid benzos  · Sleep hygiene  · Neurological status unchanged  · Patient transitioned to comfort care 03/18    Hypernatremia  Assessment & Plan  · Sodium 143 on admission, now increased to 147 on am labs on 03/17  · Patient is currently NPO 2/2 mental status  · Fluid resuscitated for sepsis with approximately 3 L on admission  · Received 500 cc bolus of isoloyte 3/15 and then Renal started D5W @ 50 cc/hr   · Trend sodium  · Patient transitioned to comfort care 03/18-stop trending labs and stop IVF  Urinary retention  Assessment & Plan  · Patient has history of urinary retention, frequent UTIs, and multiple barrera catheters placed  · Barrera changed in ED on 3/15/2020  · Cont flomax q hs  · Cont to monitor UOP  · Nephrology recs to continue manual expression of pus to avoid further obstruction of barrera  · Transitioned to comfort care  Will maintain barrera catheter      FRANCOIS on CKD (chronic kidney disease) stage 3, GFR 30-59 ml/min ([facility])  Assessment & Plan  · Baseline Cr appears to be 1 2-1 3  · Hoffmann was not draining on admission and was replaced in ER with 1 liter output  · CT A/P shows "moderate right and moderate to severe left hydroureteronephrosis identified without distal obstructive pathology identified  There is left perinephric stranding as well as stranding in the region of the left renal pelvis and proximal   Ureter "   · Cont to trend Cr now that obstructive uropathy resolved   · Urology spoke with ER team and did not recommend intervention for hydroureteronephrosis  · Nephrology consulted and following  · Not recommending HD due to age and comorbidities  · Add D5W @ 50 cc/hr given hypernatremia  · Goals of care discussion had with children given worsening worsening ATN  · They do not want feeding tube  · Family would like to pursue comfort care  Plan for discharge 03/18 to [facility], with Hospice consult  · Stop trending labs  Stop IVF  A-fib [facility])  Assessment & Plan  · Patient currently in sinus rhythm  · Not a candidate for [facility] per Cardiology notes as outpatient  · Not on rate/ rhythm controls agents prior to admission    · Transitioned to comfort care-Stop cardiac monitor    Essential hypertension  Assessment & Plan  · Patient takes norvasc 5 mg daily at home  · Transitioned to comfort care-VS prn    Coronary artery disease involving native coronary artery of native heart without angina pectoris  Assessment & Plan  · Patient has bare metal stent placed 2012 and was seeing outpatient Cardiology  · No longer taking ASA per records  · Last echo 65% with grade 1 diastolic dysfunction    Mixed hyperlipidemia  Assessment & Plan  · Patient refuses statin    H/O [address] (subarachnoid hemorrhage) and Seizures 9/2019  Assessment & Plan  · Patient was admitted to [facility] in 9/2019 with seizures and SAH s/p fall  · Patient remains aphasic at baseline  · Patient was started and maintained on Depakote  mg daily  · Check valproic acid level subtherapeutic on admit at 18  Administered loading dose of depakote  · Recheck on 3/16 was 38  · Continue IV Valproate  · Discontinued-patient transitioned to comfort care  Resolved Problems  Date Reviewed: 3/18/2020          Resolved    Fecal occult blood test positive 3/18/2020     Resolved by  MARC Abraham          Admission Date:   Admission Orders (From admission, onward)     Ordered        03/15/20 1635  Inpatient Admission  Once                     Admitting Diagnosis: UTI (urinary tract infection) [N39 0]  Altered mental status [R41 82]  Lethargy [R53 83]    HPI:  According to H&P by Елена Leavitt PA-C:  "Suyapa Mas is a 80 y o  male with past medical history of hypertension, multiple UTIs, urinary retention with chronic barrera, atrial fibrillation, seizures with subarachnoid hemorrhage September of 2019, and aphasia who presents from skilled nursing facility with increased lethargy and Barrera catheter malfunction  The nursing staff noted that the patient's Barrera was not draining and he had recently been diagnosed with the UTI  In the emergency department a bladder scan was done revealing 1 L of urine and a new urinary catheter was placed, immediately draining 1 8 L of purulence drainage  His initial labs showed a creatinine of 8 24, , bicarb 18, anion gap of 17, and potassium of 5 6  He ultimately received approximately 3 L of IV fluid and upon insertion of new catheter 2 6 L urine output  An ultrasound was done showing moderate left hydronephrosis with debris and urology was contacted by the emergency room provider  They recommended continuing fluids flushing Barrera as needed and obtaining a noncontrast CT  The CT showed a left greater than right hydroureteronephrosis without of distal obstructive pathology  There was also left perinephric and periureteral stranding consistent with possible underlying infection  Patient was started on Rocephin IV by emergency department and repeat labs showed creatinine of 7 78,   Patient has history of CKD with apparent baseline of 1 5-1 9  Nephrology contacted the patient's son and ultimately was determined patient not a candidate for hemodialysis and the son agreed his father would not want dialysis at this point  The critical care team was called for admission  Vancomycin was added to antibiotic regimen due to having a history of a MRSA UTI in October of 2019  The patient also had a UTI in January of 2020 that grew Serratia susceptible to Rocephin  The son clarified that the patient is a level 3 code status which is DNR DNI "    Procedures Performed:   Orders Placed This Encounter   Procedures   [facility] ED ECG Documentation Only       Summary of Hospital Course: Patient is a 49-year-old male with past medical history of chronic indwelling Hoffmann, recurrent UTIs, seizure status post SAH (09/2019), AFib and aphasia, presents from nursing home with altered mental status  When evaluated in emergency department patient has chronic Hoffmann catheter was not draining  Hoffmann was exchanged with 1 L of purulence urine drainage after  CT of the head negative for acute pathology  CT of abdomen after Hoffmann catheter replaced shows bilateral hydroureteronephrosis without obstruction and a decompressed bladder  Labs showed FRANCOIS with creatinine greater than 7  Patient was admitted to critical care service as a step-down level 1  Patient was started on ceftriaxone IV with culture sent prior to initiation  Urology was consulted however there was no indication for transfer from a neurological standpoint as long as Hoffmann catheter continue to drain appropriately  Nephrology was consulted and after a lengthy discussion with patient's son, he did not feel a his father would want dialysis    Nephrology did also feel that given the patient's age and comorbidities it would put him at additional risk and making poor candidate for hemodialysis  Labs and urine output was followed closely  On hospital day to, preliminary cultures from urine were positive for Pseudomonas, Morganella and E  Faecalis  This did appear to be a contaminant  Antibiotics were broadened to cefepime and vancomycin given patient condition not improving  On 03/17, family expressed they would like to have patient return to [facility] with hospice consult  On 03/18, patient transitioned to comfort care  Plan for discharge to seek manner as comfort care patient with hospice consult/evaluation at facility  Significant Findings, Care, Treatment and Services Provided:   3/15 Renal US: Moderate left-sided hydronephrosis  Debris and probable hemorrhage is noted within the urinary bladder with a Hoffmann catheter in place  Note is also made of a urinary bladder calculus  3/15 CTH: No acute intracranial abnormality  Microangiopathic changes  3/15 CT abd: Left greater than right hydroureteronephrosis without apparent distal obstructive pathology  Urinary bladder appears decompressed with a Hoffmann catheter in place and exhibits intravesicular calculi as on previous studies  Prostatomegaly noted that may be result in bladder outlet obstruction  There is also left perinephric and periureteral stranding identified and correlation with urinalysis is recommended for possible underlying infection  03/15 Urine culture:  Polymicrobic bacteruria associated with an indwelling catheter may precede bacteremia    03/18 Comfort care    Complications: n/a    Condition at Discharge: poor; comfort care only         Discharge instructions/Information to patient and family:   See after visit summary for information provided to patient and family  Provisions for Follow-Up Care:  See after visit summary for information related to follow-up care and any pertinent home health orders        PCP: Bud Hall MD    Disposition: Skilled nursing facility at [facility] Readmission: No, after discussion with family-patient transitioned to comfort care and will be discharged to [facility] with hospice evaluation at facility  Discharge Statement   I spent 30 minutes discharging the patient  This time was spent on the day of discharge  I had direct contact with the patient on the day of discharge  Additional documentation is required if more than 30 minutes were spent on discharge  Discharge Medications:  See after visit summary for reconciled discharge medications provided to patient and family

## 2020-03-18 NOTE — PROGRESS NOTES
Patient has been transitioned to comfort care measures only and is to return to [facility] today with a Hospice consultation  Nephrology will sign off  Please reach out via tigertext/call if there are any additional needs  Thank you for allowing us to participate in the care of this patient

## 2020-03-18 NOTE — ASSESSMENT & PLAN NOTE
· Baseline Cr appears to be 1 2-1 3  · Hoffmann was not draining on admission and was replaced in ER with 1 liter output  · CT A/P shows "moderate right and moderate to severe left hydroureteronephrosis identified without distal obstructive pathology identified  There is left perinephric stranding as well as stranding in the region of the left renal pelvis and proximal   Ureter "   · Cont to trend Cr now that obstructive uropathy resolved   · Urology spoke with ER team and did not recommend intervention for hydroureteronephrosis  · Nephrology consulted and following  · Not recommending HD due to age and comorbidities  · Add D5W @ 50 cc/hr given hypernatremia  · Goals of care discussion had with children given worsening worsening ATN  · They do not want feeding tube  · Family would like to pursue comfort care  Plan for discharge 03/18 to [facility], with Hospice consult  · Stop trending labs  Stop IVF

## 2020-03-18 NOTE — ASSESSMENT & PLAN NOTE
· Likely due to toxic metabolic causes like UTI and uremia  · Patient aphasic at baseline  · [facility]  · Avoid benzos  · Sleep hygiene  · Neurological status unchanged  · Patient transitioned to comfort care 03/18

## 2020-03-18 NOTE — ASSESSMENT & PLAN NOTE
· Patient was admitted to [facility] in 9/2019 with seizures and SAH s/p fall  · Patient remains aphasic at baseline  · Patient was started and maintained on Depakote  mg daily  · Check valproic acid level subtherapeutic on admit at 18  Administered loading dose of depakote  · Recheck on 3/16 was 38  · Continue IV Valproate  · Discontinued-patient transitioned to comfort care

## 2020-03-18 NOTE — ASSESSMENT & PLAN NOTE
· Patient has history of urinary retention, frequent UTIs, and multiple barrera catheters placed  · Barrera changed in ED on 3/15/2020  · Cont flomax q hs  · Cont to monitor UOP  · Nephrology recs to continue manual expression of pus to avoid further obstruction of barrera  · Transitioned to comfort care  Will maintain barrera catheter

## 2020-03-18 NOTE — ASSESSMENT & PLAN NOTE
· Sodium 143 on admission, now increased to 147 on am labs on 03/17  · Patient is currently NPO 2/2 mental status  · Fluid resuscitated for sepsis with approximately 3 L on admission  · Received 500 cc bolus of isoloyte 3/15 and then Renal started D5W @ 50 cc/hr   · Trend sodium  · Patient transitioned to comfort care 03/18-stop trending labs and stop IVF

## 2020-03-18 NOTE — CASE MANAGEMENT
Plan is to send pt back to [facility] on [facility]  [facility] will transition patient to Hospice  Importantly his wife is there and she will be able to see patient upon dc  CM spoke to son Ed in regards to visitors restrictions at [facility] and No visitors will be able to see patient upon dc there  He is understanding  Plan is for him, his sister to come in and spend about 15 minutes individually before being dc  Transportation to be arranged for about 1:30 [facility] is aware  10:15: Donya Records at [facility] to arrange transport await a call back  Medical necessity form was completed  1050: Confirmed  time is 1:30 with [place]  Nursing/ Medical team is aware of dc time/ updated [place] on Transfer time

## 2020-03-18 NOTE — PLAN OF CARE
Problem: Potential for Falls  Goal: Patient will remain free of falls  Description  INTERVENTIONS:  - Assess patient frequently for physical needs  -  Identify cognitive and physical deficits and behaviors that affect risk of falls    -  [place] fall precautions as indicated by assessment   - Educate patient/family on patient safety including physical limitations  - Instruct patient to call for assistance with activity based on assessment  - Modify environment to reduce risk of injury  - Consider OT/PT consult to assist with strengthening/mobility  Outcome: Progressing     Problem: PAIN - ADULT  Goal: Verbalizes/displays adequate comfort level or baseline comfort level  Description  Interventions:  - Encourage patient to monitor pain and request assistance  - Assess pain using appropriate pain scale  - Administer analgesics based on type and severity of pain and evaluate response  - Implement non-pharmacological measures as appropriate and evaluate response  - Consider cultural and social influences on pain and pain management  - Notify physician/advanced practitioner if interventions unsuccessful or patient reports new pain  Outcome: Progressing     Problem: INFECTION - ADULT  Goal: Absence or prevention of progression during hospitalization  Description  INTERVENTIONS:  - Assess and monitor for signs and symptoms of infection  - Monitor lab/diagnostic results  - Monitor all insertion sites, i e  indwelling lines, tubes, and drains  - Monitor endotracheal if appropriate and nasal secretions for changes in amount and color  - [place] appropriate cooling/warming therapies per order  - Administer medications as ordered  - Instruct and encourage patient and family to use good hand hygiene technique  - Identify and instruct in appropriate isolation precautions for identified infection/condition  Outcome: Progressing  Goal: Absence of fever/infection during neutropenic period  Description  INTERVENTIONS:  - Monitor WBC    Outcome: Progressing     Problem: SAFETY ADULT  Goal: Maintain or return to baseline ADL function  Description  INTERVENTIONS:  -  Assess patient's ability to carry out ADLs; assess patient's baseline for ADL function and identify physical deficits which impact ability to perform ADLs (bathing, care of mouth/teeth, toileting, grooming, dressing, etc )  - Assess/evaluate cause of self-care deficits   - Assess range of motion  - Assess patient's mobility; develop plan if impaired  - Assess patient's need for assistive devices and provide as appropriate  - Encourage maximum independence but intervene and supervise when necessary  - Involve family in performance of ADLs  - Assess for home care needs following discharge   - Consider OT consult to assist with ADL evaluation and planning for discharge  - Provide patient education as appropriate  Outcome: Progressing  Goal: Maintain or return mobility status to optimal level  Description  INTERVENTIONS:  - Assess patient's baseline mobility status (ambulation, transfers, stairs, etc )    - Identify cognitive and physical deficits and behaviors that affect mobility  - Identify mobility aids required to assist with transfers and/or ambulation (gait belt, sit-to-stand, lift, walker, cane, etc )  - [place] fall precautions as indicated by assessment  - Record patient progress and toleration of activity level on Mobility SBAR; progress patient to next Phase/Stage  - Instruct patient to call for assistance with activity based on assessment  - Consider rehabilitation consult to assist with strengthening/weightbearing, etc   Outcome: Progressing     Problem: DISCHARGE PLANNING  Goal: Discharge to home or other facility with appropriate resources  Description  INTERVENTIONS:  - Identify barriers to discharge w/patient and caregiver  - Arrange for needed discharge resources and transportation as appropriate  - Identify discharge learning needs (meds, wound care, etc )  - Arrange for interpretive services to assist at discharge as needed  - Refer to Case Management Department for coordinating discharge planning if the patient needs post-hospital services based on physician/advanced practitioner order or complex needs related to functional status, cognitive ability, or social support system  Outcome: Progressing     Problem: Knowledge Deficit  Goal: Patient/family/caregiver demonstrates understanding of disease process, treatment plan, medications, and discharge instructions  Description  Complete learning assessment and assess knowledge base  Interventions:  - Provide teaching at level of understanding  - Provide teaching via preferred learning methods  Outcome: Progressing     Problem: Prexisting or High Potential for Compromised Skin Integrity  Goal: Skin integrity is maintained or improved  Description  INTERVENTIONS:  - Identify patients at risk for skin breakdown  - Assess and monitor skin integrity  - Assess and monitor nutrition and hydration status  - Monitor labs   - Assess for incontinence   - Turn and reposition patient  - Assist with mobility/ambulation  - Relieve pressure over bony prominences  - Avoid friction and shearing  - Provide appropriate hygiene as needed including keeping skin clean and dry  - Evaluate need for skin moisturizer/barrier cream  - Collaborate with interdisciplinary team   - Patient/family teaching  - Consider wound care consult   Outcome: Progressing     Problem: Nutrition/Hydration-ADULT  Goal: Nutrient/Hydration intake appropriate for improving, restoring or maintaining nutritional needs  Description  Monitor and assess patient's nutrition/hydration status for malnutrition  Collaborate with interdisciplinary team and initiate plan and interventions as ordered  Monitor patient's weight and dietary intake as ordered or per policy  Utilize nutrition screening tool and intervene as necessary   Determine patient's food preferences and provide high-protein, high-caloric foods as appropriate       INTERVENTIONS:  - Monitor oral intake, urinary output, labs, and treatment plans  - Assess nutrition and hydration status and recommend course of action  - Evaluate amount of meals eaten  - Assist patient with eating if necessary   - Allow adequate time for meals  - Recommend/ encourage appropriate diets, oral nutritional supplements, and vitamin/mineral supplements  - Order, calculate, and assess calorie counts as needed  - Recommend, monitor, and adjust tube feedings and TPN/PPN based on assessed needs  - Assess need for intravenous fluids  - Provide specific nutrition/hydration education as appropriate  - Include patient/family/caregiver in decisions related to nutrition  Outcome: Progressing

## 2020-03-18 NOTE — TRANSPORTATION MEDICAL NECESSITY
Section I - General Information    Name of Patient: Love Wetzel                 : 1924    Medicare #: [id number]  Transport Date: 20 (PCS is valid for round trips on this date and for all repetitive trips in the 60-day range as noted below )  Origin: [address] [place] [address] West: [facility]  Is the pt's stay covered under Medicare Part A (PPS/DRG)   [x]     Closest appropriate facility? If no, why is transport to more distant facility required? Yes  If hospice pt, is this transport related to pt's terminal illness? No       Section II - Medical Necessity Questionnaire  Ambulance transportation is medically necessary only if other means of transport are contraindicated or would be potentially harmful to the patient  To meet this requirement, the patient must either be "bed confined" or suffer from a condition such that transport by means other than ambulance is contraindicated by the patient's condition  The following questions must be answered by the medical professional signing below for this form to be valid:    1)  Describe the MEDICAL CONDITION (physical and/or mental) of this patient AT [address] [place] [address] that requires the patient to be transported in an ambulance and why transport by other means is contraindicated by the patient's condition: sepsis with UTI and renal failure    2) Is the patient "bed confined" as defined below? Yes  To be "be confined" the patient must satisfy all three of the following conditions: (1) unable to get up from bed without Assistance; AND (2) unable to ambulate; AND (3) unable to sit in a chair or wheelchair  3) Can this patient safely be transported by car or wheelchair van (i e , seated during transport without a medical attendant or monitoring)?    No    4) In addition to completing questions 1-3 above, please check any of the following conditions that apply*: *Note: supporting documentation for any boxes checked must be maintained in the patient's medical records  If hosp-hosp transfer, describe services needed at 2nd facility not available at 1st facility? Medical attendant required    Decrease level of Consciousness  Unable to follow 1 step commands    Section III - Signature of Physician or Healthcare Professional  I certify that the above information is true and correct based on my evaluation of this patient, and represent that the patient requires transport by ambulance and that other forms of transport are contraindicated  I understand that this information will be used by the Centers for Medicare and Medicaid Services (CMS) to support the determination of medical necessity for ambulance services, and I represent that I have personal knowledge of the patient's condition at time of transport  []  If this box is checked, I also certify that the patient is physically or mentally incapable of signing the ambulance service's claim and that the institution with which I am affiliated has furnished care, services, or assistance to the patient  My signature below is made on behalf of the patient pursuant to 42 CFR §424 36(b)(4)  In accordance with 42 CFR §424 37, the specific reason(s) that the patient is physically or mentally incapable of signing the claim form is as follows:       Signature of Physician* or Healthcare Professional______________________________________________________________  Signature Date 03/18/20 (For scheduled repetitive transports, this form is not valid for transports performed more than 60 days after this date)    Printed Name & Credentials of Physician or Healthcare Professional (MD, DO, RN, etc )___Catherine Shabazz RN BSN____________________________  *Form must be signed by patient's attending physician for scheduled, repetitive transports   For non-repetitive, unscheduled ambulance transports, if unable to obtain the signature of the attending physician, any of the following may sign (choose appropriate option below)  [] Physician Assistant []  Clinical Nurse Specialist []  Registered Nurse  []  Nurse Practitioner  [x] Discharge Planner

## 2020-03-18 NOTE — PLAN OF CARE
Problem: Potential for Falls  Goal: Patient will remain free of falls  Description  INTERVENTIONS:  - Assess patient frequently for physical needs  -  Identify cognitive and physical deficits and behaviors that affect risk of falls    -  [place] fall precautions as indicated by assessment   - Educate patient/family on patient safety including physical limitations  - Instruct patient to call for assistance with activity based on assessment  - Modify environment to reduce risk of injury  - Consider OT/PT consult to assist with strengthening/mobility  Outcome: Progressing     Problem: PAIN - ADULT  Goal: Verbalizes/displays adequate comfort level or baseline comfort level  Description  Interventions:  - Encourage patient to monitor pain and request assistance  - Assess pain using appropriate pain scale  - Administer analgesics based on type and severity of pain and evaluate response  - Implement non-pharmacological measures as appropriate and evaluate response  - Consider cultural and social influences on pain and pain management  - Notify physician/advanced practitioner if interventions unsuccessful or patient reports new pain  Outcome: Progressing     Problem: INFECTION - ADULT  Goal: Absence or prevention of progression during hospitalization  Description  INTERVENTIONS:  - Assess and monitor for signs and symptoms of infection  - Monitor lab/diagnostic results  - Monitor all insertion sites, i e  indwelling lines, tubes, and drains  - Monitor endotracheal if appropriate and nasal secretions for changes in amount and color  - [place] appropriate cooling/warming therapies per order  - Administer medications as ordered  - Instruct and encourage patient and family to use good hand hygiene technique  - Identify and instruct in appropriate isolation precautions for identified infection/condition  Outcome: Progressing  Goal: Absence of fever/infection during neutropenic period  Description  INTERVENTIONS:  - Monitor WBC    Outcome: Progressing     Problem: SAFETY ADULT  Goal: Maintain or return to baseline ADL function  Description  INTERVENTIONS:  -  Assess patient's ability to carry out ADLs; assess patient's baseline for ADL function and identify physical deficits which impact ability to perform ADLs (bathing, care of mouth/teeth, toileting, grooming, dressing, etc )  - Assess/evaluate cause of self-care deficits   - Assess range of motion  - Assess patient's mobility; develop plan if impaired  - Assess patient's need for assistive devices and provide as appropriate  - Encourage maximum independence but intervene and supervise when necessary  - Involve family in performance of ADLs  - Assess for home care needs following discharge   - Consider OT consult to assist with ADL evaluation and planning for discharge  - Provide patient education as appropriate  Outcome: Progressing  Goal: Maintain or return mobility status to optimal level  Description  INTERVENTIONS:  - Assess patient's baseline mobility status (ambulation, transfers, stairs, etc )    - Identify cognitive and physical deficits and behaviors that affect mobility  - Identify mobility aids required to assist with transfers and/or ambulation (gait belt, sit-to-stand, lift, walker, cane, etc )  - [place] fall precautions as indicated by assessment  - Record patient progress and toleration of activity level on Mobility SBAR; progress patient to next Phase/Stage  - Instruct patient to call for assistance with activity based on assessment  - Consider rehabilitation consult to assist with strengthening/weightbearing, etc   Outcome: Progressing     Problem: DISCHARGE PLANNING  Goal: Discharge to home or other facility with appropriate resources  Description  INTERVENTIONS:  - Identify barriers to discharge w/patient and caregiver  - Arrange for needed discharge resources and transportation as appropriate  - Identify discharge learning needs (meds, wound care, etc )  - Arrange for interpretive services to assist at discharge as needed  - Refer to Case Management Department for coordinating discharge planning if the patient needs post-hospital services based on physician/advanced practitioner order or complex needs related to functional status, cognitive ability, or social support system  Outcome: Progressing     Problem: Knowledge Deficit  Goal: Patient/family/caregiver demonstrates understanding of disease process, treatment plan, medications, and discharge instructions  Description  Complete learning assessment and assess knowledge base  Interventions:  - Provide teaching at level of understanding  - Provide teaching via preferred learning methods  Outcome: Progressing     Problem: Prexisting or High Potential for Compromised Skin Integrity  Goal: Skin integrity is maintained or improved  Description  INTERVENTIONS:  - Identify patients at risk for skin breakdown  - Assess and monitor skin integrity  - Assess and monitor nutrition and hydration status  - Monitor labs   - Assess for incontinence   - Turn and reposition patient  - Assist with mobility/ambulation  - Relieve pressure over bony prominences  - Avoid friction and shearing  - Provide appropriate hygiene as needed including keeping skin clean and dry  - Evaluate need for skin moisturizer/barrier cream  - Collaborate with interdisciplinary team   - Patient/family teaching  - Consider wound care consult   Outcome: Progressing     Problem: Nutrition/Hydration-ADULT  Goal: Nutrient/Hydration intake appropriate for improving, restoring or maintaining nutritional needs  Description  Monitor and assess patient's nutrition/hydration status for malnutrition  Collaborate with interdisciplinary team and initiate plan and interventions as ordered  Monitor patient's weight and dietary intake as ordered or per policy  Utilize nutrition screening tool and intervene as necessary   Determine patient's food preferences and provide high-protein, high-caloric foods as appropriate       INTERVENTIONS:  - Monitor oral intake, urinary output, labs, and treatment plans  - Assess nutrition and hydration status and recommend course of action  - Evaluate amount of meals eaten  - Assist patient with eating if necessary   - Allow adequate time for meals  - Recommend/ encourage appropriate diets, oral nutritional supplements, and vitamin/mineral supplements  - Order, calculate, and assess calorie counts as needed  - Recommend, monitor, and adjust tube feedings and TPN/PPN based on assessed needs  - Assess need for intravenous fluids  - Provide specific nutrition/hydration education as appropriate  - Include patient/family/caregiver in decisions related to nutrition  Outcome: Progressing

## 2020-03-18 NOTE — ASSESSMENT & PLAN NOTE
· Hgb on admission 11 3, dropped to 10 5 on 3/17  · Heme occult positive  · No immediate interventions needed at this time  · Family to move to comfort care today

## 2020-03-18 NOTE — PROGRESS NOTES
Progress Note Anupama Arias 1/30/1924, 80 y o  male MRN: [id number]    Unit/Bed#: -01 Encounter: [id number]    Primary Care Provider: Amisha Comer MD   Date and time admitted to hospital: 3/15/2020 10:47 AM        * Sepsis secondary to UTI  Assessment & Plan  · Patient had MRSA UTI in 10/2019 and [place] UTI 1/2020  · Received 3 L NSS in ER, cultures and lactate sent, will trend  · Patient started on Rocephin, but will add Vanco secondary to H/O MRSA  · 03/16 Urine culture results: >100,000 cfu/ml Pseudomonas aeruginosa, >100,000 cfu/ml Morganella morganii, >100,000 cfu/ml Enterococcus faecalis  · ABX changed to cefepime  Continue vanc  Urine cx read changed to contaminant from indwelling barrera  · Repeat urine culture sent 3/17  · Pharmacy consulted for vanc dosing    Encephalopathy  Assessment & Plan  · Likely due to toxic metabolic causes like UTI and uremia  · Patient aphasic at baseline  · CTH negative for acute process on admission  · Cont to monitor and neurochecks  · Avoid benzos  · Sleep hygiene    Hypernatremia  Assessment & Plan  · Sodium 143 on admission, now increased to 147 on am labs on 03/17  · Patient is currently NPO 2/2 mental status  · Fluid resuscitated for sepsis with approximately 3 L on admission  · Received 500 cc bolus of isoloyte 3/15 and then Renal started D5W @ 50 cc/hr   · Trend sodium    Urinary retention  Assessment & Plan  · Patient has history of urinary retention, frequent UTIs, and multiple barrera catheters placed  · Barrera changed in ED on 3/15/2020  · Cont flomax q hs  · Cont to monitor UOP q 1 hour  · Nephrology recs to continue manual expression of pus to avoid further obstruction of barrera       FRANCOIS on CKD (chronic kidney disease) stage 3, GFR 30-59 ml/min ([facility])  Assessment & Plan  · Baseline Cr appears to be 1 2-1 3  · Barrera was not draining on admission and was replaced in ER with 1 liter output  · CT A/P shows "moderate right and moderate to severe left hydroureteronephrosis identified without distal obstructive pathology identified  There is left perinephric stranding as well as stranding in the region of the left renal pelvis and proximal   Ureter "   · Cont to trend Cr now that obstructive uropathy resolved   · Urology spoke with ER team and did not recommend intervention for hydroureteronephrosis  · Nephrology consulted and following  · Not recommending HD due to age and comorbidities  · Add D5W @ 50 cc/hr given hypernatremia  · Goals of care discussion had with children given worsening worsening ATN  · They do not want feeding tube  · They would like hospice consult with likely transition to comfort care tomorrow  A-fib [facility])  Assessment & Plan  · Patient currently in sinus rhythm  · Not a candidate for [facility] per Cardiology notes as outpatient  · Not on rate/ rhythm controls agents prior to admission  Essential hypertension  Assessment & Plan  · Patient takes norvasc 5 mg daily at home  · Cont and monitor    Coronary artery disease involving native coronary artery of native heart without angina pectoris  Assessment & Plan  · Patient has bare metal stent placed 2012 and was seeing outpatient Cardiology  · No longer taking ASA per records  · Last echo 65% with grade 1 diastolic dysfunction    Mixed hyperlipidemia  Assessment & Plan  · Patient refuses statin    H/O [facility] (subarachnoid hemorrhage) and Seizures 9/2019  Assessment & Plan  · Patient was admitted to [facility] in 9/2019 with seizures and SAH s/p fall  · Patient remains aphasic at baseline  · Patient was started and maintained on Depakote  mg daily  · Check valproic acid level subtherapeutic on admit at 18  Administered loading dose of depakote     · Recheck on 3/16 was 38  · Continue IV Valproate    Fecal occult blood test positive  Assessment & Plan  · Hgb on admission 11 3, dropped to 10 5 on 3/17  · Heme occult positive  · No immediate interventions needed at this time  · Family to move to comfort care today      ----------------------------------------------------------------------------------------  HPI/24hr events: D5W infusion started for hypernatremia  No acute events overnight  Due to patient's continued renal failure and over worsening function family would like to transition to comfort care at [facility]  Disposition: Transfer to Hospice  Code Status: Level 3 - DNAR and DNI  ---------------------------------------------------------------------------------------  SUBJECTIVE  n/a    Review of Systems  Review of systems was unable to be performed secondary to encephalopathy  ---------------------------------------------------------------------------------------  OBJECTIVE    Vitals   Vitals:    20 2200 20 2300 20 0000 20 0400   BP: (!) 172/65  153/68    BP Location:   Right arm    Pulse: 73 70 70    Resp: 20 20 22    Temp:   97 5 °F (36 4 °C) (!) 96 4 °F (35 8 °C)   TempSrc:   Axillary Axillary   SpO2: 95% 95% 96%    Weight:       Height:         Temp (24hrs), Av 5 °F (36 4 °C), Min:96 4 °F (35 8 °C), Max:98 °F (36 7 °C)  Current: Temperature: (!) 96 4 °F (35 8 °C)        SpO2: SpO2: 96 %, SpO2 Device: O2 Device: None (Room air)  O2 Flow Rate (L/min): 3 L/min    Physical Exam   Constitutional: No distress  HENT:   Dry O/P and lips   Cardiovascular: S1 normal, S2 normal and intact distal pulses  An irregular rhythm present  Exam reveals no gallop and no friction rub  No murmur heard  Pulmonary/Chest: Effort normal  No respiratory distress  He has decreased breath sounds  He has no wheezes  He has no rhonchi  He has no rales  Abdominal: Soft  Bowel sounds are normal  He exhibits no distension  There is no tenderness  Genitourinary:   Genitourinary Comments: Hoffmann catheter present, purulent drainage noted at meatus, urine yellow with thick sediement   Neurological: GCS eye subscore is 1  GCS verbal subscore is 1  GCS motor subscore is 4     Skin: Skin is warm and dry  Capillary refill takes 2 to 3 seconds  Invasive/non-invasive ventilation settings   Respiratory    Lab Data (Last 4 hours)    None         O2/Vent Data (Last 4 hours)    None                Laboratory and Diagnostics:  Results from last 7 days   Lab Units 03/18/20  0448 03/16/20  0432 03/15/20  2116 03/15/20  1103   WBC Thousand/uL 12 12* 8 93  --  9 60   HEMOGLOBIN g/dL 11 7* 10 5*  --  11 3*   HEMATOCRIT % 36 0* 31 8*  --  34 1*   PLATELETS Thousands/uL 157 150 149 174   BANDS PCT %  --   --   --  10*   MONO PCT %  --   --   --  16*     Results from last 7 days   Lab Units 03/18/20  0448 03/17/20  2148 03/17/20  1659 03/17/20  1200 03/17/20  0454 03/16/20  2344 03/16/20  1947  03/16/20  0432  03/15/20  1103   SODIUM mmol/L 141 149* 149* 148* 147* 147* 147*   < > 147*   < > 143   POTASSIUM mmol/L 4 8 5 2 5 1 5 3 4 8 5 1 4 7   < > 5 0   < > 5 6*   CHLORIDE mmol/L 108 114* 114* 114* 113* 113* 112*   < > 113*   < > 108   CO2 mmol/L 21 20* 18* 19* 19* 20* 19*   < > 18*   < > 18*   ANION GAP mmol/L 12 15* 17* 15* 15* 14* 16*   < > 16*   < > 17*   BUN mg/dL 174* 184* 185* 189* 181* 186* 180*   < > 175*   < > 181*   CREATININE mg/dL 6 27* 6 79* 6 72* 7 02* 6 87* 7 07* 7 32*   < > 7 49*   < > 8 24*   CALCIUM mg/dL 7 5* 8 4 8 4 8 5 8 0* 8 5 8 2*   < > 8 2*   < > 8 1*   GLUCOSE RANDOM mg/dL 408* 145* 104 88 82 84 92   < > 86   < > 97   ALT U/L 30  --   --   --   --   --   --   --  55  --  87*   AST U/L 16  --   --   --   --   --   --   --  28  --  52*   ALK PHOS U/L 89  --   --   --   --   --   --   --  105  --  131*   ALBUMIN g/dL 1 5*  --   --   --   --   --   --   --  1 5*  --  1 8*   TOTAL BILIRUBIN mg/dL 0 31  --   --   --   --   --   --   --  0 40  --  0 55    < > = values in this interval not displayed       Results from last 7 days   Lab Units 03/17/20  1200 03/16/20  0432   MAGNESIUM mg/dL 2 6 2 2   PHOSPHORUS mg/dL 7 0* 5 4*           Results from last 7 days   Lab Units 03/15/20  1518 03/15/20  1103 TROPONIN I ng/mL 0 04 0 04     Results from last 7 days   Lab Units 03/15/20  1822 03/15/20  1103   LACTIC ACID mmol/L 1 5 2 0     ABG:  Results from last 7 days   Lab Units 03/15/20  1555   PH ART  7 439   PCO2 ART mm Hg 20 5*   PO2 ART mm Hg 109 8   HCO3 ART mmol/L 13 6*   BASE EXC ART mmol/L -8 7   ABG SOURCE  Radial, Left     VBG:  Results from last 7 days   Lab Units 03/15/20  1555   ABG SOURCE  Radial, Left           Micro  Results from last 7 days   Lab Units 03/15/20  1156 03/15/20  1119 03/15/20  1103   BLOOD CULTURE   --  No Growth at 48 hrs  Staphylococcus coagulase negative*   GRAM STAIN RESULT   --   --  Gram positive cocci in clusters*   URINE CULTURE  >100,000 cfu/ml   --   --        EKG: A fib  Imaging: I have personally reviewed pertinent films in PACS    Intake and Output  I/O       03/16 0701 - 03/17 0700 03/17 0701 - 03/18 0700    I V  (mL/kg) 1830 (22 5) 1012 5 (12 5)    IV Piggyback 100 300    Total Intake(mL/kg) 1930 (23 7) 1312 5 (16 1)    Urine (mL/kg/hr) 2475 (1 3) 2450 (1 3)    Total Output 2475 2450    Net -54 -0699 5                Height and Weights   Height: 5' 10" (177 8 cm)  IBW: 73 kg  Body mass index is 25 72 kg/m²  Weight (last 2 days)     Date/Time   Weight    03/17/20 0500   81 3 (179 23)    03/16/20 0600   87 1 (192 02)                Nutrition       Diet Orders   (From admission, onward)             Start     Ordered    03/15/20 1928  Diet NPO  Diet effective now     Question Answer Comment   Diet Type NPO    RD to adjust diet per protocol?  Yes        03/15/20 1927                  Active Medications  Scheduled Meds:    Current Facility-Administered Medications:  bisacodyl 10 mg Rectal Daily PRN MARC Hansen    cefepime 1,000 mg Intravenous A35F Bairon Alva MD Last Rate: Stopped (03/17/20 1318)   dextrose 75 mL/hr Intravenous Continuous Mariel A Ramella, CRNP Last Rate: 75 mL/hr (03/18/20 0207)   heparin (porcine) 5,000 Units Subcutaneous [facility] Michael Michel Garcia De La Garza PA-C    Labetalol HCl 10 mg Intravenous R4G PRN Pretty Holbrook MD    nystatin  Topical BID Mel Hall PA-C    valproate sodium 500 mg Intravenous Daily MARC Hansen Last Rate: Stopped (03/17/20 1101)   vancomycin 12 5 mg/kg Intravenous Once PRN Samuel Ferguson PA-C      Continuous Infusions:    dextrose 75 mL/hr Last Rate: 75 mL/hr (03/18/20 0207)     PRN Meds:     bisacodyl 10 mg Daily PRN   Labetalol HCl 10 mg Q4H PRN   vancomycin 12 5 mg/kg Once PRN       Invasive Devices Review  Invasive Devices     Peripheral Intravenous Line            Peripheral IV 03/15/20 Left Forearm 2 days    Peripheral IV 03/15/20 Right Wrist 2 days          Drain            Urethral Catheter Latex 16 Fr  2 days                Rationale for remaining devices: barrera - chronic  ---------------------------------------------------------------------------------------  Advance Directive and Living Will:      Power of : Yes  POLST:    ---------------------------------------------------------------------------------------  Care Time Delivered:   No Critical Care time spent       IAC/MARC Loera      Portions of the record may have been created with voice recognition software  Occasional wrong word or "sound a like" substitutions may have occurred due to the inherent limitations of voice recognition software    Read the chart carefully and recognize, using context, where substitutions have occurred

## 2020-03-20 LAB
BACTERIA BLD CULT: ABNORMAL
GRAM STN SPEC: ABNORMAL

## 2020-03-21 LAB — HEMOCCULT STL QL: POSITIVE

## 2021-07-19 NOTE — ASSESSMENT & PLAN NOTE
Presented with AFib RVR,     Plan:  · Rate controlled now   · Continue routine metoprolol 25mg Q12H  · Eliquis put on hold X 48 hours prior to planned needle biopsy by IR on 7/21  The patient has had Barrera catheter placed twice as he has failed voiding trials  Continue barrera catheter for now until more mobile and alert

## 2022-12-29 NOTE — ASSESSMENT & PLAN NOTE
Geriatric consult, appreciate their recc's  Avoid deliriogenic medications  Maintain circadian rhythm  Encourage hydration  Continue D5 1/2NS  Patient's mentation improving this morning he is able to recognize family members names no numbness/no tingling

## 2024-01-04 NOTE — PLAN OF CARE
Problem: Prexisting or High Potential for Compromised Skin Integrity  Goal: Skin integrity is maintained or improved  Description  INTERVENTIONS:  - Identify patients at risk for skin breakdown  - Assess and monitor skin integrity  - Assess and monitor nutrition and hydration status  - Monitor labs   - Assess for incontinence   - Turn and reposition patient  - Assist with mobility/ambulation  - Relieve pressure over bony prominences  - Avoid friction and shearing  - Provide appropriate hygiene as needed including keeping skin clean and dry  - Evaluate need for skin moisturizer/barrier cream  - Collaborate with interdisciplinary team   - Patient/family teaching  - Consider wound care consult   Outcome: Progressing     Problem: Neurological Deficit  Goal: Neurological status is stable or improving  Description  Interventions:  - Monitor and assess patient's level of consciousness, motor function, sensory function, and level of assistance needed for ADLs  - Monitor and report changes from baseline  Collaborate with interdisciplinary team to initiate plan and implement interventions as ordered  - Provide and maintain a safe environment  - Consider seizure precautions  - Consider fall precautions  - Consider aspiration precautions  - Consider bleeding precautions  Outcome: Progressing     Problem: Activity Intolerance/Impaired Mobility  Goal: Mobility/activity is maintained at optimum level for patient  Description  Interventions:  - Assess and monitor patient  barriers to mobility and need for assistive/adaptive devices  - Assess patient's emotional response to limitations  - Collaborate with interdisciplinary team and initiate plans and interventions as ordered  - Encourage independent activity per ability   - Maintain proper body alignment  - Perform active/passive rom as tolerated/ordered    - Plan activities to conserve energy   - Turn patient as appropriate  Outcome: Progressing     Problem: Communication Impairment  Goal: Ability to express needs and understand communication  Description  Assess patient's communication skills and ability to understand information  Patient will demonstrate use of effective communication techniques, alternative methods of communication and understanding even if not able to speak  - Encourage communication and provide alternate methods of communication as needed  - Collaborate with case management/ for discharge needs  - Include patient/family/caregiver in decisions related to communication  Outcome: Progressing     Problem: Potential for Aspiration  Goal: Non-ventilated patient's risk of aspiration is minimized  Description  Assess and monitor vital signs, respiratory status, and labs (WBC)  Monitor for signs of aspiration (tachypnea, cough, rales, wheezing, cyanosis, fever)  - Assess and monitor patient's ability to swallow  - Place patient up in chair to eat if possible  - HOB up at 90 degrees to eat if unable to get patient up into chair   - Supervise patient during oral intake  - Instruct patient/ family to take small bites  - Instruct patient/ family to take small single sips when taking liquids  - Follow patient-specific strategies generated by speech pathologist   Outcome: Progressing     Problem: Nutrition  Goal: Nutrition/Hydration status is improving  Description  Monitor and assess patient's nutrition/hydration status for malnutrition (ex- brittle hair, bruises, dry skin, pale skin and conjunctiva, muscle wasting, smooth red tongue, and disorientation)  Collaborate with interdisciplinary team and initiate plan and interventions as ordered  Monitor patient's weight and dietary intake as ordered or per policy  Utilize nutrition screening tool and intervene per policy  Determine patient's food preferences and provide high-protein, high-caloric foods as appropriate       - Assist patient with eating   - Allow adequate time for meals   - Encourage patient to take dietary supplement as ordered  - Collaborate with clinical nutritionist   - Include patient/family/caregiver in decisions related to nutrition  Outcome: Progressing     Problem: NEUROSENSORY - ADULT  Goal: Achieves stable or improved neurological status  Description  INTERVENTIONS  - Monitor and report changes in neurological status  - Monitor vital signs such as temperature, blood pressure, glucose, and any other labs ordered   - Initiate measures to prevent increased intracranial pressure  - Monitor for seizure activity and implement precautions if appropriate      Outcome: Progressing  Goal: Achieves maximal functionality and self care  Description  INTERVENTIONS  - Monitor swallowing and airway patency with patient fatigue and changes in neurological status  - Encourage and assist patient to increase activity and self care     - Encourage visually impaired, hearing impaired and aphasic patients to use assistive/communication devices  Outcome: Progressing     Problem: CARDIOVASCULAR - ADULT  Goal: Maintains optimal cardiac output and hemodynamic stability  Description  INTERVENTIONS:  - Monitor I/O, vital signs and rhythm  - Monitor for S/S and trends of decreased cardiac output  - Administer and titrate ordered vasoactive medications to optimize hemodynamic stability  - Assess quality of pulses, skin color and temperature  - Assess for signs of decreased coronary artery perfusion  - Instruct patient to report change in severity of symptoms  Outcome: Progressing  Goal: Absence of cardiac dysrhythmias or at baseline rhythm  Description  INTERVENTIONS:  - Continuous cardiac monitoring, vital signs, obtain 12 lead EKG if ordered  - Administer antiarrhythmic and heart rate control medications as ordered  - Monitor electrolytes and administer replacement therapy as ordered  Outcome: Progressing     Problem: GASTROINTESTINAL - ADULT  Goal: Maintains or returns to baseline bowel function  Description  INTERVENTIONS:  - Assess bowel function  - Encourage oral fluids to ensure adequate hydration  - Administer IV fluids if ordered to ensure adequate hydration  - Administer ordered medications as needed  - Encourage mobilization and activity  - Consider nutritional services referral to assist patient with adequate nutrition and appropriate food choices  Outcome: Progressing  Goal: Maintains adequate nutritional intake  Description  INTERVENTIONS:  - Monitor percentage of each meal consumed  - Identify factors contributing to decreased intake, treat as appropriate  - Assist with meals as needed  - Monitor I&O, weight, and lab values if indicated  - Obtain nutrition services referral as needed  Outcome: Progressing     Problem: GENITOURINARY - ADULT  Goal: Urinary catheter remains patent  Description  INTERVENTIONS:  - Assess patency of urinary catheter  - If patient has a chronic barrera, consider changing catheter if non-functioning  - Follow guidelines for intermittent irrigation of non-functioning urinary catheter  Outcome: Progressing     Problem: METABOLIC, FLUID AND ELECTROLYTES - ADULT  Goal: Electrolytes maintained within normal limits  Description  INTERVENTIONS:  - Monitor labs and assess patient for signs and symptoms of electrolyte imbalances  - Administer electrolyte replacement as ordered  - Monitor response to electrolyte replacements, including repeat lab results as appropriate  - Instruct patient on fluid and nutrition as appropriate  Outcome: Progressing  Goal: Fluid balance maintained  Description  INTERVENTIONS:  - Monitor labs   - Monitor I/O and WT  - Instruct patient on fluid and nutrition as appropriate  - Assess for signs & symptoms of volume excess or deficit  Outcome: Progressing     Problem: SKIN/TISSUE INTEGRITY - ADULT  Goal: Skin integrity remains intact  Description  INTERVENTIONS  - Identify patients at risk for skin breakdown  - Assess and monitor skin integrity  - Assess and monitor nutrition and hydration status  - Monitor labs (i e  albumin)  - Assess for incontinence   - Turn and reposition patient  - Assist with mobility/ambulation  - Relieve pressure over bony prominences  - Avoid friction and shearing  - Provide appropriate hygiene as needed including keeping skin clean and dry  - Evaluate need for skin moisturizer/barrier cream  - Collaborate with interdisciplinary team (i e  Nutrition, Rehabilitation, etc )   - Patient/family teaching  Outcome: Progressing     Problem: HEMATOLOGIC - ADULT  Goal: Maintains hematologic stability  Description  INTERVENTIONS  - Assess for signs and symptoms of bleeding or hemorrhage  - Monitor labs  - Administer supportive blood products/factors as ordered and appropriate  Outcome: Progressing     Problem: MUSCULOSKELETAL - ADULT  Goal: Maintain or return mobility to safest level of function  Description  INTERVENTIONS:  - Assess patient's ability to carry out ADLs; assess patient's baseline for ADL function and identify physical deficits which impact ability to perform ADLs (bathing, care of mouth/teeth, toileting, grooming, dressing, etc )  - Assess/evaluate cause of self-care deficits   - Assess range of motion  - Assess patient's mobility  - Assess patient's need for assistive devices and provide as appropriate  - Encourage maximum independence but intervene and supervise when necessary  - Involve family in performance of ADLs  - Assess for home care needs following discharge   - Consider OT consult to assist with ADL evaluation and planning for discharge  - Provide patient education as appropriate  Outcome: Progressing     Problem: PAIN - ADULT  Goal: Verbalizes/displays adequate comfort level or baseline comfort level  Description  Interventions:  - Encourage patient to monitor pain and request assistance  - Assess pain using appropriate pain scale  - Administer analgesics based on type and severity of pain and evaluate response  - Implement non-pharmacological measures as appropriate and evaluate response  - Consider cultural and social influences on pain and pain management  - Notify physician/advanced practitioner if interventions unsuccessful or patient reports new pain  Outcome: Progressing     Problem: INFECTION - ADULT  Goal: Absence or prevention of progression during hospitalization  Description  INTERVENTIONS:  - Assess and monitor for signs and symptoms of infection  - Monitor lab/diagnostic results  - Monitor all insertion sites, i e  indwelling lines, tubes, and drains  - [place] appropriate cooling/warming therapies per order  - Administer medications as ordered  - Instruct and encourage patient and family to use good hand hygiene technique  - Identify and instruct in appropriate isolation precautions for identified infection/condition   Outcome: Progressing     Problem: SAFETY ADULT  Goal: Patient will remain free of falls  Description  INTERVENTIONS:  - Assess patient frequently for physical needs  -  Identify cognitive and physical deficits and behaviors that affect risk of falls    -  [place] fall precautions as indicated by assessment   - Educate patient/family on patient safety including physical limitations  - Instruct patient to call for assistance with activity based on assessment  - Modify environment to reduce risk of injury  - Consider OT/PT consult to assist with strengthening/mobility  Outcome: Progressing  Goal: Maintain or return to baseline ADL function  Description  INTERVENTIONS:  -  Assess patient's ability to carry out ADLs; assess patient's baseline for ADL function and identify physical deficits which impact ability to perform ADLs (bathing, care of mouth/teeth, toileting, grooming, dressing, etc )  - Assess/evaluate cause of self-care deficits   - Assess range of motion  - Assess patient's mobility; develop plan if impaired  - Assess patient's need for assistive devices and provide as appropriate  - Encourage maximum independence but intervene and supervise when necessary  - Involve family in performance of ADLs  - Assess for home care needs following discharge   - Consider OT consult to assist with ADL evaluation and planning for discharge  - Provide patient education as appropriate  Outcome: Progressing  Goal: Maintain or return mobility status to optimal level  Description  INTERVENTIONS:  - Assess patient's baseline mobility status (ambulation, transfers, stairs, etc )    - Identify cognitive and physical deficits and behaviors that affect mobility  - Identify mobility aids required to assist with transfers and/or ambulation (gait belt, sit-to-stand, lift, walker, cane, etc )  - [place] fall precautions as indicated by assessment  - Record patient progress and toleration of activity level on Mobility SBAR; progress patient to next Phase/Stage  - Instruct patient to call for assistance with activity based on assessment  - Consider rehabilitation consult to assist with strengthening/weightbearing, etc   Outcome: Progressing     Problem: DISCHARGE PLANNING  Goal: Discharge to home or other facility with appropriate resources  Description  INTERVENTIONS:  - Identify barriers to discharge w/patient and caregiver  - Arrange for needed discharge resources and transportation as appropriate  - Identify discharge learning needs (meds, wound care, etc )  - Arrange for interpretive services to assist at discharge as needed  - Refer to Case Management Department for coordinating discharge planning if the patient needs post-hospital services based on physician/advanced practitioner order or complex needs related to functional status, cognitive ability, or social support system  Outcome: Progressing     Problem: Knowledge Deficit  Goal: Patient/family/caregiver demonstrates understanding of disease process, treatment plan, medications, and discharge instructions  Description  Complete learning assessment and assess knowledge base  Interventions:  - Provide teaching at level of understanding  - Provide teaching via preferred learning methods  Outcome: Progressing     Problem: Potential for Falls  Goal: Patient will remain free of falls  Description  INTERVENTIONS:  - Assess patient frequently for physical needs  -  Identify cognitive and physical deficits and behaviors that affect risk of falls  -  [place] fall precautions as indicated by assessment   - Educate patient/family on patient safety including physical limitations  - Instruct patient to call for assistance with activity based on assessment  - Modify environment to reduce risk of injury  - Consider OT/PT consult to assist with strengthening/mobility  Outcome: Progressing     Problem: Nutrition/Hydration-ADULT  Goal: Nutrient/Hydration intake appropriate for improving, restoring or maintaining nutritional needs  Description  Monitor and assess patient's nutrition/hydration status for malnutrition  Collaborate with interdisciplinary team and initiate plan and interventions as ordered  Monitor patient's weight and dietary intake as ordered or per policy  Utilize nutrition screening tool and intervene as necessary  Determine patient's food preferences and provide high-protein, high-caloric foods as appropriate       INTERVENTIONS:  - Monitor oral intake, urinary output, labs, and treatment plans  - Assess nutrition and hydration status and recommend course of action  - Evaluate amount of meals eaten  - Assist patient with eating if necessary   - Allow adequate time for meals  - Recommend/ encourage appropriate diets, oral nutritional supplements, and vitamin/mineral supplements  - Order, calculate, and assess calorie counts as needed  - Recommend, monitor, and adjust tube feedings and TPN/PPN based on assessed needs  - Assess need for intravenous fluids  - Provide specific nutrition/hydration education as appropriate  - Include patient/family/caregiver in decisions related to nutrition  Outcome: Progressing     Problem: CONFUSION/THOUGHT DISTURBANCE  Goal: Thought disturbances (confusion, delirium, depression, dementia or psychosis) are managed to maintain or return to baseline mental status and functional level  Description  INTERVENTIONS:  - Assess for possible contributors to  thought disturbance, including but not limited to medications, infection, impaired vision or hearing, underlying metabolic abnormalities, dehydration, respiratory compromise,  psychiatric diagnoses and notify attending PHYSICAN/AP  - Monitor and intervene to maintain adequate nutrition, hydration, elimination, sleep and activity  - Decrease environmental stimuli, including noise as appropriate  - Provide frequent contacts to provide refocusing, direction and reassurance as needed  Approach patient calmly with eye contact and at their level  - [place] high risk fall precautions, aspiration precautions and other safety measures, as indicated  - If delirium suspected, notify physician/AP of change in condition and request immediate in-person evaluation  - Pursue consults as appropriate including Geriatric (campus dependent), OT for cognitive evaluation/activity planning, psychiatric, pastoral care, etc   Outcome: Progressing     Problem: BEHAVIOR  Goal: Pt/Family maintain appropriate behavior and adhere to behavioral management agreement, if implemented  Description  INTERVENTIONS:  - Assess the family dynamic   - Encourage verbalization of thoughts and concerns in a socially appropriate manner  - Assess patient/family's coping skills and non-compliant behavior (including use of illegal substances)    - Utilize positive, consistent limit setting strategies supporting safety of patient, staff and others  - Initiate consult with Case Management, Spiritual Care or other ancillary services as appropriate  - If a patient's/visitor's behavior jeopardizes the safety of the patient, staff, or others, refer to organization procedure  - Notify Security of behavior or suspected illegal substances which indicate the need for search of the patient and/or belongings  - Encourage participation in the decision making process about a behavioral management agreement; implement if patient meets criteria  Outcome: Progressing     Problem: SELF HARM  Goal: Effect of psychiatric condition will be minimized and patient will be protected from self harm  Description  INTERVENTIONS:  - Assess impact of patient's symptoms on level of functioning, self-care needs and offer support as indicated  - Assess patient/family knowledge of depression, impact on illness and need for teaching  - Provide emotional support, presence and reassurance  - Assess for possible suicidal thoughts, ideation or self-harm  If patient expresses suicidal thoughts or statements do not leave alone, notify physician/AP immediately, initiate suicide precautions, and determine need for continual observation    - initiate consults and referrals as appropriate (a mental health professional, Spiritual Care  Outcome: Progressing     Problem: SAFETY,RESTRAINT: NV/NON-SELF DESTRUCTIVE BEHAVIOR  Goal: Remains free of harm/injury (restraint for non violent/non self-detsructive behavior)  Description  INTERVENTIONS:  - Instruct patient/family regarding restraint use   - Assess and monitor physiologic and psychological status   - Provide interventions and comfort measures to meet assessed patient needs   - Identify and implement measures to help patient regain control  - Assess readiness for release of restraint   Outcome: Progressing  Goal: Returns to optimal restraint-free functioning  Description  INTERVENTIONS:  - Assess the patient's behavior and symptoms that indicate continued need for restraint  - Identify and implement measures to help patient regain control  - Assess readiness for release of restraint   Outcome: Progressing 160

## 2024-08-05 NOTE — ASSESSMENT & PLAN NOTE
· Sodium 143 on admission, now increased to 147 on am labs on 03/16  · Patient is currently NPO 2/2 mental status  · Fluid resuscitated for sepsis with approximately 3 L on admission  · Given 0 45% NSS x 2 liters on 03/16  · Trend sodium Right arm;

## 2025-05-29 NOTE — PROGRESS NOTES
Not able to communicate.  Presence and prayer.     Progress Note - Nephrology   Love Wetzel 80 y o  male MRN: [id number]  Unit/Bed#: -01 Encounter: [id number]    A/P:  1  Acute kidney injury on top chronic kidney disease due to acute tubular necrosis   Continue supportive care, patient will likely need additional L or 2 of volume at this time  Please refer below, due to hypernatremia will add a hypotonic saline at this time  Continue to avoid all nephrotoxins as well as IV contrast   2  Chronic kidney disease stage 3 B baseline creatinine between 1 5-1 8 mg/dL  3  Chronic tubulointerstitial disease likely  4  Hypernatremia   Most likely due to encephalopathy patient is not able to ask for free water, will add hypotonic saline from half-normal at 100 mL/hour x2 L for now  5  Acidosis   Continue monitor for now, patient is status post 75 mEq of bicarbonate yesterday  Goal serum bicarbonate 18 millimoles a grade   6  Sepsis due to urinary tract infection   Continue antibiotics, continue with other supportive care  Follow up urine and blood cultures  7  Urinary retention   Currently has Hoffmann catheter, continue to manipulating squeeze pus to not allow for further obstruction in the catheter  Discontinuation of catheter when appropriate  8  Recurrent urinary tract infections  9   Metabolic encephalopathy secondary to sepsis      Follow up reason for today's visit:  Acute kidney injury/chronic kidney disease/electrolyte abnormalities    Sepsis [facility])    Patient Active Problem List   Diagnosis    Mixed hyperlipidemia    Essential hypertension    Coronary artery disease involving native coronary artery of native heart without angina pectoris    A-fib ([address] )    H/O SAH (subarachnoid hemorrhage) and Seizures 9/2019    Encephalopathy    FRANCOIS on CKD (chronic kidney disease) stage 3, GFR 30-59 ml/min ([facility])    Urinary retention    Sepsis secondary to UTI    Hypernatremia         Subjective:   No Acute events overnight    Objective:     Vitals: Blood pressure 143/65, pulse 81, temperature 98 °F (36 7 °C), temperature source Axillary, resp  rate 13, height 5' 10" (1 778 m), weight 87 1 kg (192 lb 0 3 oz), SpO2 97 %  ,Body mass index is 27 55 kg/m²  Weight (last 2 days)     Date/Time   Weight    03/16/20 0600   87 1 (192 02)    03/15/20 2000   87 1 (192 02)    03/15/20 1930   87 1 (192 02)    03/15/20 1059   86 4 (190 48)                Intake/Output Summary (Last 24 hours) at 3/16/2020 0845  Last data filed at 3/16/2020 0600  Gross per 24 hour   Intake 3150 ml   Output 3510 ml   Net -360 ml     I/O last 3 completed shifts: In: 3150 [IV Piggyback:3150]  Out: 3510 [Urine:3510]    Urethral Catheter Non-latex (Active)       Urethral Catheter Latex 16 Fr   (Active)   Amt returned on insertion(mL) 1800 mL 3/15/2020 12:45 PM   Reasons to continue Urinary Catheter  Acute urinary retention/obstruction failing urinary retention protocol 3/15/2020 12:45 PM   Site Assessment Clean;Skin intact 3/15/2020  8:00 PM   Collection Container Standard drainage bag 3/15/2020  8:00 PM       Physical Exam: /65 (BP Location: Left arm)   Pulse 81   Temp 98 °F (36 7 °C) (Axillary)   Resp 13   Ht 5' 10" (1 778 m)   Wt 87 1 kg (192 lb 0 3 oz)   SpO2 97%   BMI 27 55 kg/m²     General Appearance:    Opens eyes spontaneously to verbal command, otherwise currently remains noncommunicative   Head:    Normocephalic, without obvious abnormality, atraumatic   Eyes:    Conjunctiva/corneas clear   Ears:    Normal external ears   Nose:   Nares normal, septum midline, mucosa normal, no drainage    or sinus tenderness   Throat:   Lips, mucosa, and tongue normal; teeth and gums normal   Neck:   Supple   Back:     Symmetric, no curvature, ROM normal, no CVA tenderness   Lungs:     Clear to auscultation bilaterally, respirations unlabored   Chest wall:    No tenderness or deformity   Heart:    Regular rate and rhythm, S1 and S2 normal, no murmur, rub   or gallop   Abdomen:     Soft, non-tender, bowel sounds active   Extremities:   Extremities normal, atraumatic, no cyanosis, +2 bilateral lower extremity edema as well as presacral edema   Skin:   Skin color, texture, turgor normal, no rashes or lesions   Lymph nodes:   Cervical normal   Neurologic:   CNII-XII intact            Lab, Imaging and other studies: I have personally reviewed pertinent labs  CBC:   Lab Results   Component Value Date    WBC 8 93 03/16/2020    HGB 10 5 (L) 03/16/2020    HCT 31 8 (L) 03/16/2020    MCV 91 03/16/2020     03/16/2020    MCH 30 1 03/16/2020    MCHC 33 0 03/16/2020    RDW 14 4 03/16/2020    MPV 11 5 03/16/2020    NRBC 0 03/16/2020     CMP:   Lab Results   Component Value Date    K 5 0 03/16/2020     (H) 03/16/2020    CO2 18 (L) 03/16/2020     (H) 03/16/2020    CREATININE 7 49 (H) 03/16/2020    CALCIUM 8 2 (L) 03/16/2020    AST 28 03/16/2020    ALT 55 03/16/2020    ALKPHOS 105 03/16/2020    EGFR 6 03/16/2020           Results from last 7 days   Lab Units 03/16/20  0432 03/15/20  2116 03/15/20  1518 03/15/20  1103   POTASSIUM mmol/L 5 0 5 3 5 4* 5 6*   CHLORIDE mmol/L 113* 112* 110* 108   CO2 mmol/L 18* 20* 16* 18*   BUN mg/dL 175* 172* 169* 181*   CREATININE mg/dL 7 49* 7 65* 7 78* 8 24*   CALCIUM mg/dL 8 2* 7 7* 7 8* 8 1*   ALK PHOS U/L 105  --   --  131*   ALT U/L 55  --   --  87*   AST U/L 28  --   --  52*         Phosphorus:   Lab Results   Component Value Date    PHOS 5 4 (H) 03/16/2020     Magnesium:   Lab Results   Component Value Date    MG 2 2 03/16/2020     Urinalysis:   Lab Results   Component Value Date    COLORU Yellow 03/15/2020    CLARITYU Cloudy 03/15/2020    SPECGRAV 1 015 03/15/2020    PHUR 8 5 (A) 03/15/2020    LEUKOCYTESUR Large (A) 03/15/2020    NITRITE Negative 03/15/2020    GLUCOSEU Negative 03/15/2020    KETONESU Negative 03/15/2020    BILIRUBINUR Small (A) 03/15/2020    BLOODU Large (A) 03/15/2020     Ionized Calcium: No results found for: CAION  Coagulation: No results found for: PT, INR, APTT  Troponin:   Lab Results   Component Value Date    TROPONINI 0 04 03/15/2020     ABG:   Lab Results   Component Value Date    PHART 7 439 03/15/2020    AWQ7HIW 20 5 (LL) 03/15/2020    PO2ART 109 8 03/15/2020    [id number] 13 6 (L) 03/15/2020    BEART -8 7 03/15/2020    SOURCE Radial, Left 03/15/2020     Radiology review:     IMAGING  Procedure: Ct Abdomen Pelvis Wo Contrast    Result Date: 3/15/2020  Narrative: CT ABDOMEN AND PELVIS WITHOUT IV CONTRAST INDICATION:   Hydronephrosis  COMPARISON:  5/25/2010, 3/15/2020 TECHNIQUE:  CT examination of the abdomen and pelvis was performed without intravenous contrast   Axial, sagittal, and coronal 2D reformatted images were created from the source data and submitted for interpretation  Radiation dose length product (DLP) for this visit:  934 mGy-cm   This examination, like all CT scans performed in the [facility], was performed utilizing techniques to minimize radiation dose exposure, including the use of iterative reconstruction and automated exposure control  Enteric contrast was administered  FINDINGS: Study limited by patient motion  ABDOMEN LOWER CHEST:  Trace basilar effusions with associated minimal atelectasis and dependent change  Lung bases are clear  Visualized cardiac structures appear intact  LIVER/BILIARY TREE:  Unremarkable  GALLBLADDER:  Contracted gallbladder containing high density material presumed to reflect vicarious excretion  There has been removed since intravenous contrast injection  Conglomerate gallbladder stones and sludge could have a similar appearance without  evidence of acute cholecystitis  SPLEEN:  Unremarkable  PANCREAS:  Unremarkable  ADRENAL GLANDS:  Unremarkable  KIDNEYS/URETERS:  Moderate right and moderate to severe left hydroureteronephrosis identified without distal obstructive pathology identified   There is left perinephric stranding as well as stranding in the region of the left renal pelvis and proximal ureter  Correlate with urinalysis  STOMACH AND BOWEL:  Unremarkable  APPENDIX:  No findings to suggest appendicitis  ABDOMINOPELVIC CAVITY:  No ascites or free intraperitoneal air  No lymphadenopathy  VESSELS:  Unremarkable for patient's age  PELVIS REPRODUCTIVE ORGANS:  The prostate is enlarged  URINARY BLADDER:  Dependent stones identified  Hoffmann catheter in place with nondistended bladder  ABDOMINAL WALL/INGUINAL REGIONS:  Unremarkable  OSSEOUS STRUCTURES:  Right L3 transverse process fracture appears subacute with some callus formation noted  Left iliopsoas myositis ossificans noted on 2/95  Impression: Left greater than right hydroureteronephrosis as above without apparent distal obstructive pathology  The urinary bladder appears decompressed with a Hoffmann catheter in place and exhibits intravesicular calculi as on previous studies  Prostatomegaly noted  that may be result in bladder outlet obstruction  There is also left perinephric and periureteral stranding identified and correlation with urinalysis is recommended for possible underlying infection  Workstation performed: [id number]     Procedure: Xr Chest Portable - 1 View    Result Date: 3/15/2020  Narrative: CHEST INDICATION:   lethargy  COMPARISON:  January 9, 2020 EXAM PERFORMED/VIEWS:  XR CHEST PORTABLE FINDINGS: Cardiomediastinal silhouette appears unremarkable  Lung markings are crowded secondary to low lung volumes  Within limitations of this examination there is no focal airspace opacity to suggest pneumonia  No pneumothorax or pleural effusion  Osseous structures appear within normal limits for patient age  Impression: No active pulmonary disease on examination which is somewhat limited secondary to low lung volumes  Workstation performed: [id number]     Procedure: Ct Head Without Contrast    Result Date: 3/15/2020  Narrative: CT BRAIN - WITHOUT CONTRAST INDICATION:   Altered mental status   COMPARISON:  September 26, 2019 TECHNIQUE:  CT examination of the brain was performed  In addition to axial images, coronal 2D reformatted images were created and submitted for interpretation  Radiation dose length product (DLP) for this visit:  956 mGy-cm   This examination, like all CT scans performed in the [facility], was performed utilizing techniques to minimize radiation dose exposure, including the use of iterative reconstruction and automated exposure control  IMAGE QUALITY:  Diagnostic  FINDINGS: PARENCHYMA: Decreased attenuation is noted in periventricular and subcortical white matter demonstrating an appearance that is statistically most likely to represent moderate microangiopathic change  No CT signs of acute infarction  No intracranial mass, mass effect or midline shift  No acute parenchymal hemorrhage  VENTRICLES AND EXTRA-AXIAL SPACES:  Normal for the patient's age  VISUALIZED ORBITS AND PARANASAL SINUSES:  Unremarkable  CALVARIUM AND EXTRACRANIAL SOFT TISSUES:  Normal      Impression: No acute intracranial abnormality  Microangiopathic changes  Workstation performed: [id number]     Procedure: Us Kidney And Bladder    Result Date: 3/15/2020  Narrative: RENAL ULTRASOUND INDICATION:   UTI  COMPARISON: 5/25/2010 TECHNIQUE:   Ultrasound of the retroperitoneum was performed with a curvilinear transducer utilizing volumetric sweeps and still imaging techniques  FINDINGS: KIDNEYS: Symmetric and normal size  Right kidney:  11 7 cm  Left kidney:  14 8 cm  Right kidney Normal echogenicity and contour  No suspicious masses detected  No hydronephrosis  No shadowing calculi  No perinephric fluid collections  Left kidney Normal echogenicity and contour  No suspicious masses detected  There is moderate left-sided hydronephrosis  No shadowing calculi  No perinephric fluid collections  URETERS: Nonvisualized  BLADDER: There is a Hoffmann catheter within the urinary bladder    There is debris and probable hemorrhage within the urinary bladder  There is a shadowing calculus seen  Urinary bladder is distended  Impression: Moderate left-sided hydronephrosis  Debris and probable hemorrhage is noted within the urinary bladder with a Hoffmann catheter in place  Note is also made of a urinary bladder calculus  Workstation performed: [id number]       Current Facility-Administered Medications   Medication Dose Route Frequency    amLODIPine (NORVASC) tablet 5 mg  5 mg Oral Daily    cefTRIAXone (ROCEPHIN) IVPB (premix) 1,000 mg  1,000 mg Intravenous Q24H    docusate sodium (COLACE) capsule 100 mg  100 mg Oral BID    heparin (porcine) subcutaneous injection 5,000 Units  5,000 Units Subcutaneous Q8H [address]    nystatin (MYCOSTATIN) powder   Topical BID    valproate (DEPACON) 500 mg in sodium chloride 0 9 % 50 mL IVPB  500 mg Intravenous Daily     Medications Discontinued During This Encounter   Medication Reason    chlorhexidine (PERIDEX) 0 12 % oral rinse 15 mL     divalproex sodium (DEPAKOTE SPRINKLE) capsule 500 mg        Pepe Lazo, DO      This progress note was produced in part using a dictation device which may document imprecise wording from author's original intent